# Patient Record
Sex: MALE | Race: WHITE | Employment: FULL TIME | URBAN - METROPOLITAN AREA
[De-identification: names, ages, dates, MRNs, and addresses within clinical notes are randomized per-mention and may not be internally consistent; named-entity substitution may affect disease eponyms.]

---

## 2017-01-13 ENCOUNTER — OFFICE VISIT (OUTPATIENT)
Dept: CARDIOLOGY CLINIC | Age: 52
End: 2017-01-13

## 2017-01-13 ENCOUNTER — CLINICAL SUPPORT (OUTPATIENT)
Dept: CARDIOLOGY CLINIC | Age: 52
End: 2017-01-13

## 2017-01-13 VITALS
BODY MASS INDEX: 29.12 KG/M2 | DIASTOLIC BLOOD PRESSURE: 82 MMHG | WEIGHT: 208 LBS | HEART RATE: 62 BPM | HEIGHT: 71 IN | SYSTOLIC BLOOD PRESSURE: 130 MMHG

## 2017-01-13 DIAGNOSIS — R00.2 PALPITATIONS: Primary | ICD-10-CM

## 2017-01-13 DIAGNOSIS — R00.0 SINUS TACHYCARDIA: Primary | ICD-10-CM

## 2017-01-13 DIAGNOSIS — I49.1 PAC (PREMATURE ATRIAL CONTRACTION): ICD-10-CM

## 2017-01-13 DIAGNOSIS — R00.2 HEART PALPITATIONS: ICD-10-CM

## 2017-01-13 DIAGNOSIS — I49.3 PVC (PREMATURE VENTRICULAR CONTRACTION): ICD-10-CM

## 2017-01-13 DIAGNOSIS — E78.5 DYSLIPIDEMIA: ICD-10-CM

## 2017-01-13 RX ORDER — SIMVASTATIN 10 MG/1
10 TABLET, FILM COATED ORAL
Qty: 30 TAB | Refills: 4 | Status: SHIPPED | OUTPATIENT
Start: 2017-01-13 | End: 2017-12-13

## 2017-01-13 NOTE — MR AVS SNAPSHOT
Visit Information Date & Time Provider Department Dept. Phone Encounter #  
 1/13/2017  3:40 PM Nancy Ashton MD [facility] [phone] [id number] Follow-up Instructions Return in about 6 weeks (around 2/24/2017). Your Appointments 2/7/2017 10:00 AM  
Any with Camilo Casarez MD  
[facility] ([facility]) Appt Note: 1st  adherence, bring cpap .  
 9250 [facility] 99 [phone] 9164 [facility] [address] Upcoming Health Maintenance Date Due INFLUENZA AGE 9 TO ADULT 8/1/2016 FOBT Q 1 YEAR AGE 50-75 6/27/2017 DTaP/Tdap/Td series (2 - Td) 11/28/2024 Allergies as of 1/13/2017  Review Complete On: 1/13/2017 By: Nancy Ashton MD  
 No Known Allergies Current Immunizations  Reviewed on 11/28/2014 Name Date Tdap 11/28/2014 Not reviewed this visit You Were Diagnosed With   
  
 Codes Comments Palpitations    -  Primary ICD-10-CM: R00.2 ICD-9-CM: 785.1 Heart palpitations     ICD-10-CM: R00.2 ICD-9-CM: 785.1 Vitals BP Pulse Height(growth percentile) Weight(growth percentile) BMI Smoking Status 130/82 62 5' 11\" (1.803 m) 208 lb (94.3 kg) 29.01 kg/m2 Never Smoker Vitals History BMI and BSA Data Body Mass Index Body Surface Area  
 29.01 kg/m 2 2.17 m 2 Preferred Pharmacy Pharmacy Name Phone [address], [address] [place] [address] [phone] Your Updated Medication List  
  
   
This list is accurate as of: 1/13/17  3:49 PM.  Always use your most recent med list.  
  
  
  
  
 aspirin 81 mg chewable tablet Take 1 tablet by mouth daily. NexIUM 20 mg capsule Generic drug:  esomeprazole Take  by mouth two (2) times a day. PARoxetine 20 mg tablet Commonly known as:  PAXIL Take 1 Tab by mouth daily. We Performed the Following AMB POC EKG ROUTINE W/ 12 LEADS, INTER & REP [49951 CPT(R)] Follow-up Instructions Return in about 6 weeks (around 2/24/2017). Patient Instructions Please visit [email or web address] for more information on multivitamins including coenzyme Q10. Introducing [facility]! Dear Radha Ames: 
Thank you for requesting a Parents Journey account. Our records indicate that you already have an active Parents Journey account. You can access your account anytime at [email or web address] Did you know that you can access your hospital and ER discharge instructions at any time in Parents Journey? You can also review all of your test results from your hospital stay or ER visit. Additional Information If you have questions, please visit the Frequently Asked Questions section of the Parents Journey website at [email or web address] Remember, Parents Journey is NOT to be used for urgent needs. For medical emergencies, dial 911. Now available from your iPhone and Android! Please provide this summary of care documentation to your next provider. Your primary care clinician is listed as Osiris Lua. If you have any questions after today's visit, please call [phone].

## 2017-01-13 NOTE — PROGRESS NOTES
LAST OFFICE VISIT : Visit date not found        ICD-10-CM ICD-9-CM   1. Palpitations R00.2 785.1   2. Heart palpitations R00.2 785.1            Oly Vaca is a 46 y.o. male new patient referred for palpitations. Cardiac risk factors: dyslipidemia, male gender, sedentary lifestyle. I have personally obtained the history from the patient. HISTORY OF PRESENTING ILLNESS      He reports intermittent palpitations occurring for the last few years that have not changed in frequency or intensity. He states he takes Nexium regularly for acid reflux. He drinks 1-2 drinks per night. He denies smoking. He also denies any family history of heart issues. He is sedentary. The patient denies chest pain/ shortness of breath, orthopnea, PND, LE edema, syncope, presyncope or fatigue.        ACTIVE PROBLEM LIST     Patient Active Problem List    Diagnosis Date Noted    H/O colonoscopy 06/27/2016    History of depression 01/22/2015    Overweight(278.02) 11/28/2014    Organic sleep apnea 11/28/2014    GERD (gastroesophageal reflux disease) 06/19/2014    Hadley's esophagus 06/19/2014           PAST MEDICAL HISTORY     Past Medical History   Diagnosis Date    Depression      due to family deaths     GERD (gastroesophageal reflux disease)      barrets esophageus     Personal history of Uttb-Wxahp-Mcnlrex disease            PAST SURGICAL HISTORY     Past Surgical History   Procedure Laterality Date    Hx carpal tunnel release Bilateral     Hx tonsillectomy      Hx endoscopy  09/2014          ALLERGIES     No Known Allergies       FAMILY HISTORY     Family History   Problem Relation Age of Onset    Heart Disease Mother     negative for cardiac disease       SOCIAL HISTORY     Social History     Social History    Marital status: SINGLE     Spouse name: N/A    Number of children: N/A    Years of education: N/A     Social History Main Topics    Smoking status: Never Smoker    Smokeless tobacco: Former User    Alcohol use Yes      Comment: ocassioanlly     Drug use: No    Sexual activity: Yes     Other Topics Concern    None     Social History Narrative         MEDICATIONS     Current Outpatient Prescriptions   Medication Sig    simvastatin (ZOCOR) 10 mg tablet Take 1 Tab by mouth nightly.  PARoxetine (PAXIL) 20 mg tablet Take 1 Tab by mouth daily.  esomeprazole (NEXIUM) 20 mg capsule Take  by mouth two (2) times a day.  aspirin 81 mg chewable tablet Take 1 tablet by mouth daily. No current facility-administered medications for this visit. I have reviewed the nurses notes, vitals, problem list, allergy list, medical history, family, social history and medications. REVIEW OF SYMPTOMS      General: Pt denies excessive weight gain or loss. Pt is able to conduct ADL's  HEENT: Denies blurred vision, headaches, hearing loss, epistaxis and difficulty swallowing. Respiratory: Denies cough, congestion, shortness of breath, MIRAMONTES, wheezing or stridor. Cardiovascular: Denies precordial pain, palpitations, edema or PND  Gastrointestinal: Denies poor appetite, indigestion, abdominal pain or blood in stool  Genitourinary: Denies hematuria, dysuria, increased urinary frequency  Musculoskeletal: Denies joint pain or swelling from muscles or joints  Neurologic: Denies tremor, paresthesias, headache, or sensory motor disturbance  Psychiatric: Denies confusion, insomnia, depression  Integumentray: Denies rash, itching or ulcers. Hematologic: Denies easy bruising, bleeding     PHYSICAL EXAMINATION      Vitals:    01/13/17 1514   BP: 130/82   Pulse: 62   Weight: 208 lb (94.3 kg)   Height: 5' 11\" (1.803 m)     General: Well developed, in no acute distress. HEENT: No jaundice, oral mucosa moist, no oral ulcers  Neck: Supple, no stiffness, no lymphadenopathy, supple  Heart:  Normal S1/S2 negative S3 or S4.  Regular, no murmur, gallop or rub, no jugular venous distention  Respiratory: Clear bilaterally x 4, no wheezing or rales  Abdomen:   Soft, non-tender, bowel sounds are active.   Extremities:  No edema, normal cap refill, no cyanosis. Musculoskeletal: No clubbing, no deformities  Neuro: A&Ox3, speech clear, gait stable, cooperative, no focal neurologic deficits  Skin: Skin color is normal. No rashes or lesions. Non diaphoretic, moist.  Vascular: 2+ pulses symmetric in all extremities        EKG:      DIAGNOSTIC DATA     1. Lipids  3/8/16- , HDL 53, ,        LABORATORY DATA            Lab Results   Component Value Date/Time    WBC 3.8 03/10/2016 08:45 AM    HGB 15.5 03/10/2016 08:45 AM    HCT 45.1 03/10/2016 08:45 AM    PLATELET 338 90/88/9588 08:45 AM    MCV 92.0 03/10/2016 08:45 AM      Lab Results   Component Value Date/Time    Sodium 139 03/10/2016 08:45 AM    Potassium 3.9 03/10/2016 08:45 AM    Chloride 102 03/10/2016 08:45 AM    CO2 31 03/10/2016 08:45 AM    Anion gap 6 03/10/2016 08:45 AM    Glucose 105 03/10/2016 08:45 AM    BUN 15 03/10/2016 08:45 AM    Creatinine 1.11 03/10/2016 08:45 AM    BUN/Creatinine ratio 14 03/10/2016 08:45 AM    GFR est AA >60 03/10/2016 08:45 AM    GFR est non-AA >60 03/10/2016 08:45 AM    Calcium 8.6 03/10/2016 08:45 AM    Bilirubin, total 1.1 03/10/2016 08:45 AM    ALT 32 03/10/2016 08:45 AM    AST 19 03/10/2016 08:45 AM    Alk. phosphatase 77 03/10/2016 08:45 AM    Protein, total 6.9 03/10/2016 08:45 AM    Albumin 4.0 03/10/2016 08:45 AM    Globulin 2.9 03/10/2016 08:45 AM    A-G Ratio 1.4 03/10/2016 08:45 AM           ASSESSMENT/RECOMMENDATIONS:.      1. Palpitations   -maybe related to Paxil but I am not really sure   -not seeing any irregularity on EKG today  -do favor placing Holter and obtaining echo to ensure that he has no significant valvular abnormalities. 2. Dyslipidemia  -start Zocor 10 mg daily   -check FLP in 2 months     3.  Follow up in 6 months or PRN    Orders Placed This Encounter    LIVER FUNCTION PANEL    LIPID PANEL    AMB POC EKG ROUTINE W/ 12 LEADS, INTER & REP     Order Specific Question:   Reason for Exam:     Answer:   palp    simvastatin (ZOCOR) 10 mg tablet     Sig: Take 1 Tab by mouth nightly. Dispense:  30 Tab     Refill:  4        Follow-up Disposition:  Return in about 6 weeks (around 2/24/2017). I have discussed the diagnosis with  Gladystine Miracle and the intended plan as seen in the above orders. Questions were answered concerning future plans. I have discussed medication side effects and warnings with the patient as well. Thank you,  Nicole Cortes MD for involving me in the care of  Gladystine Miracle. Please do not hesitate to contact me for further questions/concerns. This note was written by Marit Nissen, scribekick, as dictated by Aviva Rascon MD.      Negrita Mendez. MD [place], [address] [place] [address] [address]      [phone] / [phone] Fax

## 2017-01-13 NOTE — PROGRESS NOTES
Visit Vitals    /82    Pulse 62    Ht 5' 11\" (1.803 m)    Wt 208 lb (94.3 kg)    BMI 29.01 kg/m2

## 2017-01-24 ENCOUNTER — CLINICAL SUPPORT (OUTPATIENT)
Dept: CARDIOLOGY CLINIC | Age: 52
End: 2017-01-24

## 2017-01-24 DIAGNOSIS — I49.3 PVC (PREMATURE VENTRICULAR CONTRACTION): ICD-10-CM

## 2017-01-24 DIAGNOSIS — R00.0 SINUS TACHYCARDIA: Primary | ICD-10-CM

## 2017-01-24 DIAGNOSIS — I49.1 PAC (PREMATURE ATRIAL CONTRACTION): ICD-10-CM

## 2017-01-25 LAB
ALBUMIN SERPL-MCNC: 4.6 G/DL (ref 3.5–5.5)
ALP SERPL-CCNC: 68 IU/L (ref 39–117)
ALT SERPL-CCNC: 27 IU/L (ref 0–44)
AST SERPL-CCNC: 21 IU/L (ref 0–40)
BILIRUB DIRECT SERPL-MCNC: 0.2 MG/DL (ref 0–0.4)
BILIRUB SERPL-MCNC: 0.8 MG/DL (ref 0–1.2)
CHOLEST SERPL-MCNC: 192 MG/DL (ref 100–199)
HDLC SERPL-MCNC: 49 MG/DL
LDLC SERPL CALC-MCNC: 124 MG/DL (ref 0–99)
PROT SERPL-MCNC: 6.6 G/DL (ref 6–8.5)
TRIGL SERPL-MCNC: 96 MG/DL (ref 0–149)
VLDLC SERPL CALC-MCNC: 19 MG/DL (ref 5–40)

## 2017-02-15 ENCOUNTER — OFFICE VISIT (OUTPATIENT)
Dept: FAMILY MEDICINE CLINIC | Age: 52
End: 2017-02-15

## 2017-02-15 VITALS
HEIGHT: 71 IN | OXYGEN SATURATION: 97 % | SYSTOLIC BLOOD PRESSURE: 126 MMHG | DIASTOLIC BLOOD PRESSURE: 84 MMHG | TEMPERATURE: 98.4 F | WEIGHT: 211 LBS | RESPIRATION RATE: 16 BRPM | BODY MASS INDEX: 29.54 KG/M2 | HEART RATE: 68 BPM

## 2017-02-15 DIAGNOSIS — R52 BODY ACHES: Primary | ICD-10-CM

## 2017-02-15 DIAGNOSIS — J06.9 ACUTE URI: ICD-10-CM

## 2017-02-15 LAB
QUICKVUE INFLUENZA TEST: NEGATIVE
S PYO AG THROAT QL: NEGATIVE
VALID INTERNAL CONTROL?: YES
VALID INTERNAL CONTROL?: YES

## 2017-02-15 RX ORDER — BENZONATATE 200 MG/1
200 CAPSULE ORAL
Qty: 21 CAP | Refills: 0 | Status: SHIPPED | OUTPATIENT
Start: 2017-02-15 | End: 2017-02-22

## 2017-02-15 NOTE — PROGRESS NOTES
MARLENE Quiles is a 46 y.o. male who presents for cough, sneezing and body aches, fatigue, itchy eyes for  8 days ago . the context: brant recently diagnosed with URI. Denies fever, chills CP/ SOB/ N/V/ diarrhea. Patient tried  Mucinex and Zyrtec with mild relief. Patient does not have a history of smoking. Allergies:   No Known Allergies    Meds:   Current Outpatient Prescriptions   Medication Sig Dispense Refill    benzonatate (TESSALON) 200 mg capsule Take 1 Cap by mouth three (3) times daily as needed for Cough for up to 7 days. 21 Cap 0    simvastatin (ZOCOR) 10 mg tablet Take 1 Tab by mouth nightly. 30 Tab 4    PARoxetine (PAXIL) 20 mg tablet Take 1 Tab by mouth daily. 30 Tab 5    esomeprazole (NEXIUM) 20 mg capsule Take  by mouth two (2) times a day.  aspirin 81 mg chewable tablet Take 1 tablet by mouth daily.  90 tablet 4       PMH:  Past Medical History   Diagnosis Date    Depression      due to family deaths     GERD (gastroesophageal reflux disease)      barrets esophageus     Personal history of Wktc-Khixf-Mihyeor disease        SH:  Smoker:  History   Smoking Status    Never Smoker   Smokeless Tobacco    Former User       ETOH:   History   Alcohol Use    Yes     Comment: ocassanamika        FH:   Family History   Problem Relation Age of Onset    Heart Disease Mother        ROS: Positive for Items in bold:   Constitutional: headache, fever, fatigue, weight loss or weight gain      Eyes: redness, pruritis, pain, visual changes, swelling, or discharge      Ears: ear pain, loss or changes in hearing     Nose: congestion, rhinorrhea  Oropharynx: sore throat, lesions in throat  Cardiac: chest pain      Resp: cough or shortness of breath      GI: nausea, vomiting, constipation, diarrhea, blood in stool  : frequency, urgency, dysuria, hematuria   Neuro: dizziness, numbness, tingling  Psych: anxiety, depression, stress     Physical Exam:  Visit Vitals    /84 (BP 1 Location: Right arm, BP Patient Position: Sitting)    Pulse 68    Temp 98.4 °F (36.9 °C) (Oral)    Resp 16    Ht 5' 11\" (1.803 m)    Wt 211 lb (95.7 kg)    SpO2 97%    BMI 29.43 kg/m2       Gen: No apparent distress. Alert and oriented and responds to all questions appropriately. Eyes: Conjunctiva injected  extraocular movements are intact. Ear: External ears are normal. Hearing grossly normal b/l. Tympanic membranes are clear and without effusion. Nose: Turbinates are within normal limits. No drainage. Oropharynx: No oral lesions or exudates. Neck: Supple; no masses; no  Cervical lymphadenopathy appreciated  Lungs: Respirations unlabored; clear to auscultation bilaterally  Cardio: Regular, rate, and rhythm without murmurs, gallops or rubs   Abdomen: Soft; nontender; nondistended; normoactive bowel sounds; no masses or organomegaly  Ext: Well perfused. No edema. Skin: No obvious rashes. Lab Results   Component Value Date/Time    Sodium 139 03/10/2016 08:45 AM    Potassium 3.9 03/10/2016 08:45 AM    Chloride 102 03/10/2016 08:45 AM    CO2 31 03/10/2016 08:45 AM    Anion gap 6 03/10/2016 08:45 AM    Glucose 105 03/10/2016 08:45 AM    BUN 15 03/10/2016 08:45 AM    Creatinine 1.11 03/10/2016 08:45 AM    BUN/Creatinine ratio 14 03/10/2016 08:45 AM    GFR est AA >60 03/10/2016 08:45 AM    GFR est non-AA >60 03/10/2016 08:45 AM    Calcium 8.6 03/10/2016 08:45 AM     Lab Results   Component Value Date/Time    Cholesterol, total 192 01/24/2017 08:07 AM    HDL Cholesterol 49 01/24/2017 08:07 AM    LDL, calculated 124 01/24/2017 08:07 AM    VLDL, calculated 19 01/24/2017 08:07 AM    Triglyceride 96 01/24/2017 08:07 AM           Assessment and Plan:   Akila Kitchen is a 46 y.o. male who presents for URI      ICD-10-CM ICD-9-CM    1. Body aches R52 780.96 AMB POC RAPID STREP A      AMB POC RAPID INFLUENZA TEST   2. Acute URI J06.9 465.9 benzonatate (TESSALON) 200 mg capsule   flu neg.    Tessalon pearls for cough    - Advised on the need to stay well hydrated and that symptoms can last up to 1.5 weeks with an additional 3 days on average for smokers  - Can use tylenol/motrin as needed for generalized muscle pain and fever  - Continue Mucinex for cough, may also try Tea with honey  - Wash hand frequently and cough/sneeze into your sleeve to help prevent   infection of others   - Educated on the lack of benefit of antibiotics in a viral illness but advised to call if symptoms worsening past 6 days      Discussed diagnoses in detail with patient. Patient expressed understanding of and agreement to above plan. All questions and concerns addressed. Medication risks/benefits/side effects discussed with patient. Patient is counseled to return to the office and/or ED if symptoms do not improve as expected. Patient discussed with Dr. Charisma Hooks, Attending Physician. Jovani Hull MD  02/15/17    Family Medicine Resident

## 2017-02-15 NOTE — MR AVS SNAPSHOT
Visit Information Date & Time Provider Department Dept. Phone Encounter #  
 2/15/2017  6:15 PM Jovani YUSUF 3 Franky Chowdhury, 15[address] [facility] [phone] [id number] Follow-up Instructions Return if symptoms worsen or fail to improve. Your Appointments 2/28/2017  9:00 AM  
Any with Francisca Dixon MD  
[address] [facility] ([facility]) Appt Note: 1st  adherence, bring cpap .; pt rescheduled [address] [place] [phone] 9191 [facility] [phone]  
  
    
 7/7/2017  4:00 PM  
ESTABLISHED PATIENT with Charito Berg MD  
[facility] ([facility]) Appt Note: 6 mo fu appt  [address] [facility],[address] [address]  
[phone]  
  
   
 [address] [address] [address] Upcoming Health Maintenance Date Due INFLUENZA AGE 9 TO ADULT 8/1/2016 FOBT Q 1 YEAR AGE 50-75 6/27/2017 DTaP/Tdap/Td series (2 - Td) 11/28/2024 Allergies as of 2/15/2017  Review Complete On: 2/15/2017 By: Krystal Leggett, AMARA, RT, NM, ARRT No Known Allergies Current Immunizations  Reviewed on 11/28/2014 Name Date Tdap 11/28/2014 Not reviewed this visit You Were Diagnosed With   
  
 Codes Comments Body aches    -  Primary ICD-10-CM: K92 ICD-9-CM: 780.96 Acute URI     ICD-10-CM: J06.9 ICD-9-CM: 465.9 Vitals BP Pulse Temp Resp Height(growth percentile) Weight(growth percentile) 126/84 (BP 1 Location: Right arm, BP Patient Position: Sitting) 68 98.4 °F (36.9 °C) (Oral) 16 5' 11\" (1.803 m) 211 lb (95.7 kg) SpO2 BMI Smoking Status 97% 29.43 kg/m2 Never Smoker Vitals History BMI and BSA Data Body Mass Index Body Surface Area  
 29.43 kg/m 2 2.19 m 2 Preferred Pharmacy Pharmacy Name Phone  [phone] [phone] Your Updated Medication List  
  
   
This list is accurate as of: 2/15/17  6:39 PM.  Always use your most recent med list.  
  
  
  
  
 aspirin 81 mg chewable tablet Take 1 tablet by mouth daily. benzonatate 200 mg capsule Commonly known as:  TESSALON Take 1 Cap by mouth three (3) times daily as needed for Cough for up to 7 days. NexIUM 20 mg capsule Generic drug:  esomeprazole Take  by mouth two (2) times a day. PARoxetine 20 mg tablet Commonly known as:  PAXIL Take 1 Tab by mouth daily. simvastatin 10 mg tablet Commonly known as:  ZOCOR Take 1 Tab by mouth nightly. Prescriptions Printed Refills  
 benzonatate (TESSALON) 200 mg capsule 0 Sig: Take 1 Cap by mouth three (3) times daily as needed for Cough for up to 7 days. Class: Print Route: Oral  
  
We Performed the Following AMB POC RAPID INFLUENZA TEST [15723 CPT(R)] AMB POC RAPID STREP A [21838 CPT(R)] Follow-up Instructions Return if symptoms worsen or fail to improve. Patient Instructions - Advised on the need to stay well hydrated and that symptoms can last up to 1.5 weeks with an additional 3 days on average for smokers 
- Can use tylenol/motrin as needed for generalized muscle pain and fever - Continue Mucinex for cough, may also try Tea with honey - Wash hand frequently and cough/sneeze into your sleeve to help prevent  
infection of others - Educated on the lack of benefit of antibiotics in a viral illness but advised to call if symptoms worsening past 6 days Viral Respiratory Infection: Care Instructions Your Care Instructions Viruses are very small organisms. They grow in number after they enter your body. There are many types that cause different illnesses, such as colds and the mumps. The symptoms of a viral respiratory infection often start quickly.  They include a fever, sore throat, and runny nose. You may also just not feel well. Or you may not want to eat much. Most viral respiratory infections are not serious. They usually get better with time and self-care. Antibiotics are not used to treat a viral infection. That's because antibiotics will not help cure a viral illness. In some cases, antiviral medicine can help your body fight a serious viral infection. Follow-up care is a key part of your treatment and safety. Be sure to make and go to all appointments, and call your doctor if you are having problems. It's also a good idea to know your test results and keep a list of the medicines you take. How can you care for yourself at home? · Rest as much as possible until you feel better. · Be safe with medicines. Take your medicine exactly as prescribed. Call your doctor if you think you are having a problem with your medicine. You will get more details on the specific medicine your doctor prescribes. · Take an over-the-counter pain medicine, such as acetaminophen (Tylenol), ibuprofen (Advil, Motrin), or naproxen (Aleve), as needed for pain and fever. Read and follow all instructions on the label. Do not give aspirin to anyone younger than 20. It has been linked to Reye syndrome, a serious illness. · Drink plenty of fluids, enough so that your urine is light yellow or clear like water. Hot fluids, such as tea or soup, may help relieve congestion in your nose and throat. If you have kidney, heart, or liver disease and have to limit fluids, talk with your doctor before you increase the amount of fluids you drink. · Try to clear mucus from your lungs by breathing deeply and coughing. · Gargle with warm salt water once an hour. This can help reduce swelling and throat pain. Use 1 teaspoon of salt mixed in 1 cup of warm water. · Do not smoke or allow others to smoke around you.  If you need help quitting, talk to your doctor about stop-smoking programs and medicines. These can increase your chances of quitting for good. To avoid spreading the virus · Cough or sneeze into a tissue. Then throw the tissue away. · If you don't have a tissue, use your hand to cover your cough or sneeze. Then clean your hand. You can also cough into your sleeve. · Wash your hands often. Use soap and warm water. Wash for 15 to 20 seconds each time. · If you don't have soap and water near you, you can clean your hands with alcohol wipes or gel. When should you call for help? Call your doctor now or seek immediate medical care if: 
· You have a new or higher fever. · Your fever lasts more than 48 hours. · You have trouble breathing. · You have a fever with a stiff neck or a severe headache. · You are sensitive to light. · You feel very sleepy or confused. Watch closely for changes in your health, and be sure to contact your doctor if: 
· You do not get better as expected. Where can you learn more? Go to http://arben-bayron.info/. Enter W284 in the search box to learn more about \"Viral Respiratory Infection: Care Instructions. \" Current as of: June 30, 2016 Content Version: 11.1 © 6132-1187 [employer], [employer]. Care instructions adapted under license by [employer] (which disclaims liability or warranty for this information). If you have questions about a medical condition or this instruction, always ask your healthcare professional. [place] [address] any warranty or liability for your use of this information. Introducing [facility]! Dear Abigail Villavicencio: 
Thank you for requesting a [employer] account. Our records indicate that you already have an active [employer] account. You can access your account anytime at [email or web address]. Hokey Pokey/Center[employer] Did you know that you can access your hospital and ER discharge instructions at any time in [employer]? You can also review all of your test results from your hospital stay or ER visit. Additional Information If you have questions, please visit the Frequently Asked Questions section of the [employer] website at [email or web address] Remember, [employer] is NOT to be used for urgent needs. For medical emergencies, dial 911. Now available from your iPhone and Android! Please provide this summary of care documentation to your next provider. Your primary care clinician is listed as Irl Imam. If you have any questions after today's visit, please call [phone].

## 2017-02-15 NOTE — PROGRESS NOTES
Chief Complaint   Patient presents with    Nasal Congestion     symptoms for about five days    Generalized Body Aches

## 2017-02-15 NOTE — PATIENT INSTRUCTIONS
- Advised on the need to stay well hydrated and that symptoms can last up to 1.5 weeks with an additional 3 days on average for smokers  - Can use tylenol/motrin as needed for generalized muscle pain and fever  - Continue Mucinex for cough, may also try Tea with honey  - Wash hand frequently and cough/sneeze into your sleeve to help prevent   infection of others   - Educated on the lack of benefit of antibiotics in a viral illness but advised to call if symptoms worsening past 6 days         Viral Respiratory Infection: Care Instructions  Your Care Instructions  Viruses are very small organisms. They grow in number after they enter your body. There are many types that cause different illnesses, such as colds and the mumps. The symptoms of a viral respiratory infection often start quickly. They include a fever, sore throat, and runny nose. You may also just not feel well. Or you may not want to eat much. Most viral respiratory infections are not serious. They usually get better with time and self-care. Antibiotics are not used to treat a viral infection. That's because antibiotics will not help cure a viral illness. In some cases, antiviral medicine can help your body fight a serious viral infection. Follow-up care is a key part of your treatment and safety. Be sure to make and go to all appointments, and call your doctor if you are having problems. It's also a good idea to know your test results and keep a list of the medicines you take. How can you care for yourself at home? · Rest as much as possible until you feel better. · Be safe with medicines. Take your medicine exactly as prescribed. Call your doctor if you think you are having a problem with your medicine. You will get more details on the specific medicine your doctor prescribes. · Take an over-the-counter pain medicine, such as acetaminophen (Tylenol), ibuprofen (Advil, Motrin), or naproxen (Aleve), as needed for pain and fever.  Read and follow all instructions on the label. Do not give aspirin to anyone younger than 20. It has been linked to Reye syndrome, a serious illness. · Drink plenty of fluids, enough so that your urine is light yellow or clear like water. Hot fluids, such as tea or soup, may help relieve congestion in your nose and throat. If you have kidney, heart, or liver disease and have to limit fluids, talk with your doctor before you increase the amount of fluids you drink. · Try to clear mucus from your lungs by breathing deeply and coughing. · Gargle with warm salt water once an hour. This can help reduce swelling and throat pain. Use 1 teaspoon of salt mixed in 1 cup of warm water. · Do not smoke or allow others to smoke around you. If you need help quitting, talk to your doctor about stop-smoking programs and medicines. These can increase your chances of quitting for good. To avoid spreading the virus  · Cough or sneeze into a tissue. Then throw the tissue away. · If you don't have a tissue, use your hand to cover your cough or sneeze. Then clean your hand. You can also cough into your sleeve. · Wash your hands often. Use soap and warm water. Wash for 15 to 20 seconds each time. · If you don't have soap and water near you, you can clean your hands with alcohol wipes or gel. When should you call for help? Call your doctor now or seek immediate medical care if:  · You have a new or higher fever. · Your fever lasts more than 48 hours. · You have trouble breathing. · You have a fever with a stiff neck or a severe headache. · You are sensitive to light. · You feel very sleepy or confused. Watch closely for changes in your health, and be sure to contact your doctor if:  · You do not get better as expected. Where can you learn more? Go to http://arben-bayron.info/. Enter O003 in the search box to learn more about \"Viral Respiratory Infection: Care Instructions. \"  Current as of: June 30, 2016  Content Version: 11.1  © 0636-2057 [employer]. Care instructions adapted under license by [employer] (which disclaims liability or warranty for this information). If you have questions about a medical condition or this instruction, always ask your healthcare professional. [address] any warranty or liability for your use of this information.

## 2017-02-28 ENCOUNTER — OFFICE VISIT (OUTPATIENT)
Dept: SLEEP MEDICINE | Age: 52
End: 2017-02-28

## 2017-02-28 VITALS
SYSTOLIC BLOOD PRESSURE: 129 MMHG | OXYGEN SATURATION: 97 % | BODY MASS INDEX: 30.24 KG/M2 | DIASTOLIC BLOOD PRESSURE: 75 MMHG | HEART RATE: 60 BPM | TEMPERATURE: 98.1 F | HEIGHT: 71 IN | WEIGHT: 216 LBS

## 2017-02-28 DIAGNOSIS — E66.9 OBESITY (BMI 30-39.9): ICD-10-CM

## 2017-02-28 DIAGNOSIS — G47.33 OBSTRUCTIVE SLEEP APNEA (ADULT) (PEDIATRIC): Primary | ICD-10-CM

## 2017-02-28 NOTE — PATIENT INSTRUCTIONS
[address] [place]. [place], [address]  Tel.  [phone]  Fax. [address] [facility] [address]  [place], [address]  Tel.  [phone]  Fax. [phone] [address] [facility] [address]  Tel.  [phone]  Fax. [phone]     PROPER SLEEP HYGIENE    What to avoid  · Do not have drinks with caffeine, such as coffee or black tea, for 8 hours before bed. · Do not smoke or use other types of tobacco near bedtime. Nicotine is a stimulant and can keep you awake. · Avoid drinking alcohol late in the evening, because it can cause you to wake in the middle of the night. · Do not eat a big meal close to bedtime. If you are hungry, eat a light snack. · Do not drink a lot of water close to bedtime, because the need to urinate may wake you up during the night. · Do not read or watch TV in bed. Use the bed only for sleeping and sexual activity. What to try  · Go to bed at the same time every night, and wake up at the same time every morning. Do not take naps during the day. · Keep your bedroom quiet, dark, and cool. · Get regular exercise, but not within 3 to 4 hours of your bedtime. .  · Sleep on a comfortable pillow and mattress. · If watching the clock makes you anxious, turn it facing away from you so you cannot see the time. · If you worry when you lie down, start a worry book. Well before bedtime, write down your worries, and then set the book and your concerns aside. · Try meditation or other relaxation techniques before you go to bed. · If you cannot fall asleep, get up and go to another room until you feel sleepy. Do something relaxing. Repeat your bedtime routine before you go to bed again. · Make your house quiet and calm about an hour before bedtime. Turn down the lights, turn off the TV, log off the computer, and turn down the volume on music. This can help you relax after a busy day.     Drowsy Driving  The [address] [place] [address] [employer] Traffic Safety Administration cites drowsiness as a causing factor in more than 883,254 police reported crashes annually, resulting in 76,000 injuries and 1,500 deaths. Other surveys suggest 55% of people polled have driven while drowsy in the past year, 23% had fallen asleep but not crashed, 3% crashed, and 2% had and accident due to drowsy driving. Who is at risk? Young Drivers: One study of drowsy driving accidents states that 55% of the drivers were under 25 years. Of those, 75% were male. Shift Workers and Travelers: People who work overnight or travel across time zones frequently are at higher risk of experiencing Circadian Rhythm Disorders. They are trying to work and function when their body is programed to sleep. Sleep Deprived: Lack of sleep has a serious impact on your ability to pay attention or focus on a task. Consistently getting less than the average of 8 hours your body needs creates partial or cumulative sleep deprivation. Untreated Sleep Disorders: Sleep Apnea, Narcolepsy, R.L.S., and other sleep disorders (untreated) prevent a person from getting enough restful sleep. This leads to excessive daytime sleepiness and increases the risk for drowsy driving accidents by up to 7 times. Medications / Alcohol: Even over the counter medications can cause drowsiness. Medications that impair a drivers attention should have a warning label. Alcohol naturally makes you sleepy and on its own can cause accidents. Combined with excessive drowsiness its effects are amplified. Signs of Drowsy Driving:   * You don't remember driving the last few miles   * You may drift out of your daphney   * You are unable to focus and your thoughts wander   * You may yawn more often than normal   * You have difficulty keeping your eyes open / nodding off   * Missing traffic signs, speeding, or tailgating  Prevention-   Good sleep hygiene, lifestyle and behavioral choices have the most impact on drowsy driving.  There is no substitute for sleep and the average person requires 8 hours nightly. If you find yourself driving drowsy, stop and sleep. Consider the sleep hygiene tips provided during your visit as well. Medication Refill Policy: Refills for all medications require 1 week advance notice. Please have your pharmacy fax a refill request. We are unable to fax, or call in \"controled substance\" medications and you will need to pick these prescriptions up from our office. [employer] Activation    Thank you for requesting access to PutPlace. Please follow the instructions below to securely access and download your online medical record. PutPlace allows you to send messages to your doctor, view your test results, renew your prescriptions, schedule appointments, and more. How Do I Sign Up? 1. In your internet browser, go to [email or web address]. 2. Click on the First Time User? Click Here link in the Sign In box. You will see the New Member Sign Up page. 3. Enter your PutPlace Access Code exactly as it appears below. You will not need to use this code after youve completed the sign-up process. If you do not sign up before the expiration date, you must request a new code. PutPlace Access Code: Activation code not generated  Current PutPlace Status: Active (This is the date your PutPlace access code will )    4. Enter the last four digits of your Social Security Number (xxxx) and Date of Birth (mm/dd/yyyy) as indicated and click Submit. You will be taken to the next sign-up page. 5. Create a PutPlace ID. This will be your PutPlace login ID and cannot be changed, so think of one that is secure and easy to remember. 6. Create a PutPlace password. You can change your password at any time. 7. Enter your Password Reset Question and Answer. This can be used at a later time if you forget your password. 8. Enter your e-mail address. You will receive e-mail notification when new information is available in [address]   9. Click Sign Up. You can now view and download portions of your medical record. 10. Click the Download Summary menu link to download a portable copy of your medical information. Additional Information    If you have questions, please call [phone]. Remember, Anagran is NOT to be used for urgent needs. For medical emergencies, dial 911.

## 2017-02-28 NOTE — PROGRESS NOTES
[address] [facility]. [place], [address]  Tel.  [phone]  Fax. 100 [facility] 60  [place], [address] [facility]  Tel.  [phone]  Fax. [phone] [address]  Tel.  [phone]  Fax. [phone]     S>Seth A Hildegarde Scheuermann is a 46 y.o. male seen for a positive airway pressure follow-up. He reports no problems using the device. He is 70% compliant over the past 30 days. The following problems are identified:    Drowsiness no Problems exhaling no   Snoring no Forget to put on no   Mask Comfortable yes Can't fall asleep no   Dry Mouth no Mask falls off no   Air Leaking no Frequent awakenings no       Download reviewed. He admits that his sleep has improved. No Known Allergies    He has a current medication list which includes the following prescription(s): simvastatin, paroxetine, esomeprazole, and aspirin. Gerald Flores He  has a past medical history of Depression; GERD (gastroesophageal reflux disease); and Personal history of Hasn-Zahve-Qlyculj disease. Felda Sleepiness Score: 5   and Modified F.O.S.Q. Score Total / 2: 16.5   which reflect improved sleep quality over therapy time. O>    Visit Vitals    /75    Pulse 60    Temp 98.1 °F (36.7 °C)    Ht 5' 11\" (1.803 m)    Wt 216 lb (98 kg)    SpO2 97%    BMI 30.13 kg/m2           General:   Alert, oriented, not in distress   Neck:   No JVD    Chest/Lungs:  symetrical lung expansion , no accessory muscle use    Extremities:  no obvious rashes , negative edema    Neuro:  No focal deficits ; No obvious tremor    Psych:  Normal affect ,  Normal countenance ;           A>    ICD-10-CM ICD-9-CM    1. Obstructive sleep apnea (adult) (pediatric) G47.33 327.23    2. Obesity (BMI 30-39. 9) E66.9 278.00      AHI = 5.2(2014). On CPAP :  5-10 cmH2O. Compliant:      yes    Therapeutic Response:  Positive    P>      he will continue on his current pressure settings.     * He was asked to contact our office for any problems regarding PAP therapy. * Counseling was provided regarding the importance of regular PAP use and on proper sleep hygiene and safe driving. * Re-enforced proper and regular cleaning for the device. 2. Obesity - I have counseled the patient regarding the benefits of weight loss. Lucio Keita MD  Diplomate in Sleep Medicine  [facility]  .

## 2017-03-02 DIAGNOSIS — E78.00 HYPERCHOLESTEREMIA: Primary | ICD-10-CM

## 2017-07-20 ENCOUNTER — OFFICE VISIT (OUTPATIENT)
Dept: FAMILY MEDICINE CLINIC | Age: 52
End: 2017-07-20

## 2017-07-20 VITALS
HEIGHT: 71 IN | RESPIRATION RATE: 16 BRPM | BODY MASS INDEX: 29.54 KG/M2 | TEMPERATURE: 98.5 F | WEIGHT: 211 LBS | SYSTOLIC BLOOD PRESSURE: 125 MMHG | DIASTOLIC BLOOD PRESSURE: 73 MMHG | OXYGEN SATURATION: 98 % | HEART RATE: 58 BPM

## 2017-07-20 DIAGNOSIS — M25.552 LEFT HIP PAIN: Primary | ICD-10-CM

## 2017-07-20 DIAGNOSIS — F41.9 ANXIETY: ICD-10-CM

## 2017-07-20 DIAGNOSIS — K64.9 HEMORRHOIDS, UNSPECIFIED HEMORRHOID TYPE: ICD-10-CM

## 2017-07-20 RX ORDER — BETAMETHASONE SODIUM PHOSPHATE AND BETAMETHASONE ACETATE 3; 3 MG/ML; MG/ML
6 INJECTION, SUSPENSION INTRA-ARTICULAR; INTRALESIONAL; INTRAMUSCULAR; SOFT TISSUE ONCE
Qty: 2 ML | Refills: 0
Start: 2017-07-20 | End: 2017-07-20

## 2017-07-20 RX ORDER — LIDOCAINE HYDROCHLORIDE 10 MG/ML
6 INJECTION INFILTRATION; PERINEURAL ONCE
Qty: 6 ML | Refills: 0
Start: 2017-07-20 | End: 2017-07-20

## 2017-07-20 RX ORDER — PAROXETINE HYDROCHLORIDE 20 MG/1
20 TABLET, FILM COATED ORAL DAILY
Qty: 30 TAB | Refills: 5 | Status: SHIPPED | OUTPATIENT
Start: 2017-07-20 | End: 2018-04-06 | Stop reason: SDUPTHER

## 2017-07-20 NOTE — PROGRESS NOTES
[facility]  OFFICE PROCEDURE PROGRESS NOTE        Chart reviewed for the following: The physician, Randall Crow MD, has reviewed the History, Physical and updated the Allergic reactions for Rah A [address] performed immediately prior to start of procedure:    The physician, Randall Crow MD, has performed the following reviews on [facility] prior to the start of the procedure:            * Patient was identified by name and date of birth   * Agreement on procedure being performed was verified  * Risks and Benefits explained to the patient  * Procedure site verified and marked as necessary  * Patient was positioned for comfort  * Consent was signed and verified     Time: 1640      Date of procedure: 7/20/2017    Procedure performed by:  Soledad Germain MD    Provider assisted by: Sara Wray LPN    Patient assisted by: self    How tolerated by patient: tolerated the procedure well with no complications    Post Procedural Pain Scale: 2 - Hurts Little Bit    Comments: nonee

## 2017-07-20 NOTE — PROGRESS NOTES
Harpal Reid is a 46 y.o. male who presents for left hip pain. 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 He has used opioid pain medication in the past but currently just using tylenol and BC powder. He has had corticosteroid injections in the past with good relief of pain for 6-8 months. No injury or trauma. Pain waxes and wanes with his activity. He has a h/o depression/anxiety. Takes Paxil. Sx well controlled with Paxil and denies side effects. He needs a refill of medication. H/o hemorrhoids. The have been more painful recently. He has tried conservative management. He had a colonoscopy 2-3 years ago and was told it was normal except for the hemorrhoids. PMHx:  Past Medical History:   Diagnosis Date    Depression     due to family deaths     GERD (gastroesophageal reflux disease)     barrets esophageus     Personal history of Nmom-Ttctw-Qroqnhr disease        Meds:   Current Outpatient Prescriptions   Medication Sig Dispense Refill    PARoxetine (PAXIL) 20 mg tablet Take 1 Tab by mouth daily. 30 Tab 5    esomeprazole (NEXIUM) 20 mg capsule Take  by mouth two (2) times a day.  aspirin 81 mg chewable tablet Take 1 tablet by mouth daily. 90 tablet 4    simvastatin (ZOCOR) 10 mg tablet Take 1 Tab by mouth nightly. 30 Tab 4       Allergies:   No Known Allergies    Smoker:  History   Smoking Status    Never Smoker   Smokeless Tobacco    Former User       ETOH:   History   Alcohol Use    Yes     Comment: ocassanamika        FH:   Family History   Problem Relation Age of Onset    Heart Disease Mother        ROS:  Per HPI otherwise negative. Physical Exam:  Visit Vitals    /73    Pulse (!) 58    Temp 98.5 °F (36.9 °C) (Oral)    Resp 16    Ht 5' 11\" (1.803 m)    Wt 211 lb (95.7 kg)    SpO2 98%    BMI 29.43 kg/m2     Gen: NAD. Responds to all questions appropriately. Lungs: No labored respirations.   Skin: No obvious rash  Ext: Well perfused. No edema. MSK - Hip left:    Deformity: None    ROM: Limited in all directions on the left hip compared to right. Gait: slightly antalgic. Palpation:    L1-L5: No tenderness    Sacrum: No tenderness    Coccyx: No tenderness    Left Paraspinal: No tenderness    Right Paraspinal: No tenderness    Greater trochanter: No tenderness    Ischial Tuberosity: No tenderness    Piriformis: No tenderness       Strength (0-5/5)    Hip Flexion:   Left: 5/5  Right: 5/5    Hip Extension:  Left: 5/5  Right: 5/5    Hip Abduction:  Left: 5/5  Right: 5/5    Hip Adduction:  Left: 5/5  Right: 5/5    Knee Extension:  Left: 5/5  Right: 5/5    Knee Flexion:   Left: 5/5  Right: 5/5       Sensation: Intact, no deficits      Special test:    Straight leg: Left: Negative  Right: Negative      Imaging: Radiographs of the left hip personally reviewed and demonstrates no obvious fracture or dislocation. Humeral head and neck deformity likely related to h/o Ssmj-Shfar-Wpomaus. PROCEDURE NOTE:     Informed consent obtained verbally and risks, benefits and alternatives discussed. Time out performed, cross checking patient ID and procedure. The left hip was cleaned and prepped with sterile technique using Chloraprep x3 and anesthetized with ethyl chloride spray. The femoral acetabular joint was identified with ultrasound and was injected  from an anterior-lateral approach with Celestone 12 mg and 3 ml of 1% lidocaine under sterile conditions using ultrasound guidance. The patient tolerated the procedure well and there were no complications. He had improvement of pain following the injection. Assessment:    ICD-10-CM ICD-9-CM    1. Left hip pain M25.552 719.45 XR HIP LT W OR WO PELV 2-3 VWS   2. Anxiety F41.9 300.00 PARoxetine (PAXIL) 20 mg tablet   3. Hemorrhoids, unspecified hemorrhoid type K64.9 455.6 REFERRAL TO GENERAL SURGERY       Plan:  Left hip pain: DJD. Corticosteroid injection as above. Tylenol PRN. If sx worsen and/or no improvement with injection, then can consider joint replacement. Anxiety/depression: Well controlled. Continue Paxil  Hemorrhoids: Referred to [facility] - Dr. Briseida Colon for consideration of hemorrhoidectomy.      RTC: 6 months

## 2017-07-20 NOTE — MR AVS SNAPSHOT
Visit Information Date & Time Provider Department Dept. Phone Encounter #  
 7/20/2017  3:45 PM [facility] [phone] [id number] Upcoming Health Maintenance Date Due FOBT Q 1 YEAR AGE 50-75 6/27/2017 INFLUENZA AGE 9 TO ADULT 8/1/2017 DTaP/Tdap/Td series (2 - Td) 11/28/2024 Allergies as of 7/20/2017  Review Complete On: 7/20/2017 By: Olam Cushing, LPN No Known Allergies Current Immunizations  Reviewed on 11/28/2014 Name Date Tdap 11/28/2014 Not reviewed this visit You Were Diagnosed With   
  
 Codes Comments Left hip pain    -  Primary ICD-10-CM: G73.625 ICD-9-CM: 719.45 Anxiety     ICD-10-CM: F41.9 ICD-9-CM: 300.00 Hemorrhoids, unspecified hemorrhoid type     ICD-10-CM: K64.9 ICD-9-CM: 783. 6 Vitals BP Pulse Temp Resp Height(growth percentile) Weight(growth percentile) 125/73 (!) 58 98.5 °F (36.9 °C) (Oral) 16 5' 11\" (1.803 m) 211 lb (95.7 kg) SpO2 BMI Smoking Status 98% 29.43 kg/m2 Never Smoker BMI and BSA Data Body Mass Index Body Surface Area  
 29.43 kg/m 2 2.19 m 2 Preferred Pharmacy Pharmacy Name Phone [address], [address] [place] [address] [phone] Your Updated Medication List  
  
   
This list is accurate as of: 7/20/17  4:40 PM.  Always use your most recent med list.  
  
  
  
  
 aspirin 81 mg chewable tablet Take 1 tablet by mouth daily. NexIUM 20 mg capsule Generic drug:  esomeprazole Take  by mouth two (2) times a day. PARoxetine 20 mg tablet Commonly known as:  PAXIL Take 1 Tab by mouth daily. simvastatin 10 mg tablet Commonly known as:  ZOCOR Take 1 Tab by mouth nightly. Prescriptions Sent to Pharmacy Refills PARoxetine (PAXIL) 20 mg tablet 5 Sig: Take 1 Tab by mouth daily.   
 Class: Normal  
 [facility]acy: [facility] [address] [place] [address], [address] [place] [address] #: [phone] Route: Oral  
  
We Performed the Following REFERRAL TO GENERAL SURGERY [REF27 Custom] Comments:  
 Please evaluate patient for evaluation of hemorrhoidectomy. Please refer to Dr. Ron Morley. To-Do List   
 07/20/2017 Imaging:  XR HIP LT W OR WO PELV 2-3 VWS Referral Information Referral ID Referred By Referred To  
  
 [id number] [facility], [address] Not Available Visits Status Start Date End Date 1 New Request 7/20/17 7/20/18 If your referral has a status of pending review or denied, additional information will be sent to support the outcome of this decision. Introducing [facility]! Dear Dodie Andrade: 
Thank you for requesting a Apps Genius account. Our records indicate that you have previously registered for a Apps Genius account but its currently inactive. Please call our Apps Genius support line at [phone]. Additional Information If you have questions, please visit the Frequently Asked Questions section of the Apps Genius website at [email or web address] Remember, Apps Genius is NOT to be used for urgent needs. For medical emergencies, dial 911. Now available from your iPhone and Android! Please provide this summary of care documentation to your next provider. Your primary care clinician is listed as Nemo Marrufo. If you have any questions after today's visit, please call [phone].

## 2017-07-27 ENCOUNTER — TELEPHONE (OUTPATIENT)
Dept: FAMILY MEDICINE CLINIC | Age: 52
End: 2017-07-27

## 2017-07-27 NOTE — TELEPHONE ENCOUNTER
----- Message from [facility] sent at 7/26/2017  5:41 PM EDT -----  Regarding: Dr. Reena Simon  Mrs. Cristin Albarran, pt's spouse, inquiring on the referral to general surgerian. Mrs. Cristin Albarran stated, if the office would call and just leave a voicemail message of  physician information they will call and schedule an appt. Also they have been waiting on this information since last visit on 7/20/17.   Best contact number [phone]

## 2017-07-27 NOTE — TELEPHONE ENCOUNTER
Left the name of the physician for patient to call & informed him to call me back with the date of the appointment. ...

## 2017-08-11 ENCOUNTER — OFFICE VISIT (OUTPATIENT)
Dept: SURGERY | Age: 52
End: 2017-08-11

## 2017-08-11 VITALS
TEMPERATURE: 98.1 F | HEART RATE: 55 BPM | DIASTOLIC BLOOD PRESSURE: 80 MMHG | RESPIRATION RATE: 14 BRPM | BODY MASS INDEX: 29.26 KG/M2 | SYSTOLIC BLOOD PRESSURE: 129 MMHG | OXYGEN SATURATION: 99 % | HEIGHT: 71 IN | WEIGHT: 209 LBS

## 2017-08-11 DIAGNOSIS — K64.8 INTERNAL BLEEDING HEMORRHOIDS: Primary | ICD-10-CM

## 2017-08-11 NOTE — PROGRESS NOTES
Surgery History and Physical    Subjective:      Samantha Shine is a 46 y.o. white male who presents for evaluation of hemorrhoids. Since his prep for his colonoscopy 2 years ago, Mr. Harleen Quezada has had problems with his hemorrhoids. With his bowel movements, his hemorrhoids prolapse. They either spontaneously reduce or he manually does it. He is now having bright red blood with almost each BM as well. He denies any pain or problems with constipation or diarrhea. He has 1 or 2 BM's per day without straining. He does take a high fiber diet. He denies any trauma to the area or previous hemorrhoid or anal procedures. His colonoscopy was otherwise negative. He has no personal or family h/o colorectal or anal cancer. Past Medical History:   Diagnosis Date    Depression     due to family deaths     GERD (gastroesophageal reflux disease)     barrets esophageus     Personal history of Nglt-Xbrjh-Jqtojce disease      Past Surgical History:   Procedure Laterality Date    HX CARPAL TUNNEL RELEASE Bilateral     HX COLONOSCOPY      HX ENDOSCOPY  09/2014    HX TONSILLECTOMY        Family History   Problem Relation Age of Onset    Heart Disease Mother      Social History   Substance Use Topics    Smoking status: Never Smoker    Smokeless tobacco: Former User    Alcohol use Yes      Comment: ocassioanlly       Prior to Admission medications    Medication Sig Start Date End Date Taking? Authorizing Provider   PARoxetine (PAXIL) 20 mg tablet Take 1 Tab by mouth daily. 7/20/17  Yes Angy Quinteros MD   esomeprazole (NEXIUM) 20 mg capsule Take  by mouth two (2) times a day. Yes Historical Provider   aspirin 81 mg chewable tablet Take 1 tablet by mouth daily. 10/14/14  Yes Tete Foreman MD   simvastatin (ZOCOR) 10 mg tablet Take 1 Tab by mouth nightly.  1/13/17   Carolynn Tanner MD      No Known Allergies    Review of Systems:  A comprehensive review of systems was negative except for that written in the History of Present Illness. Objective:      Physical Exam:  GENERAL: alert, cooperative, no distress, appears stated age, EYE: negative findings: anicteric sclera, LYMPHATIC: Cervical, supraclavicular nodes normal. , THROAT & NECK: neck supple and symmetrical.  The thyroid is grossly normal., LUNG: clear to auscultation bilaterally, HEART: regular rate and rhythm, ABDOMEN: Soft, NT, ND., EXTREMITIES:  no edema, SKIN: Normal., NEUROLOGIC: negative, PSYCHIATRIC: non focal, RECTAL: Grossly normal sphincter tone. Grossly heme negative, but no stool in the vault. There are moderate internal hemorrhoids, but no other masses. DIAGNOSTIC ANOSCOPY:  Moderate internal hemorrhoids. No other anal masses. No gross blood. Assessment:     Bleeding internal hemorrhoids, grade 2. Plan:     Mr. Carlos Francis appears to have moderate hemorrhoidal disease. I have given him the option of having them excised or continuing with conservative treatment. He has decided on the latter. I have recommended that he maintain a high fiber diet or add a fiber supplement. If he has persistent bleeding when not having BM's, or develops pain or difficulty moving his bowels, then he should come back to be re-evaluated. Otherwise, he can f/u prn.     Signed By: Adama Liz MD     August 11, 2017

## 2017-08-11 NOTE — PROGRESS NOTES
1. Have you been to the ER, urgent care clinic since your last visit? Hospitalized since your last visit?no    2. Have you seen or consulted any other health care providers outside of the [address] [place] [address] since your last visit? Include any pap smears or colon screening.  no

## 2017-09-25 ENCOUNTER — OFFICE VISIT (OUTPATIENT)
Dept: FAMILY MEDICINE CLINIC | Age: 52
End: 2017-09-25

## 2017-09-25 VITALS
BODY MASS INDEX: 29.61 KG/M2 | HEART RATE: 71 BPM | OXYGEN SATURATION: 100 % | RESPIRATION RATE: 16 BRPM | TEMPERATURE: 97.9 F | DIASTOLIC BLOOD PRESSURE: 85 MMHG | SYSTOLIC BLOOD PRESSURE: 132 MMHG | HEIGHT: 71 IN | WEIGHT: 211.5 LBS

## 2017-09-25 DIAGNOSIS — Z87.39 PERSONAL HISTORY OF LEGG-CALVE-PERTHES DISEASE: ICD-10-CM

## 2017-09-25 DIAGNOSIS — M25.552 PAIN OF LEFT HIP JOINT: Primary | ICD-10-CM

## 2017-09-25 RX ORDER — CELECOXIB 100 MG/1
100 CAPSULE ORAL 2 TIMES DAILY
Qty: 60 CAP | Refills: 2 | Status: SHIPPED | OUTPATIENT
Start: 2017-09-25 | End: 2017-12-13

## 2017-09-25 NOTE — PROGRESS NOTES
Subjective:   History: This patient is a 46 y.o. male who presents for f/u of left hip pain. H/o Leg Calv Perthese as a child (reports that it started at [facility]). He was seen here 2 months ago for left hip pain at what time he received a corticosteroid injection that helped relief the pain for about 3 weeks. 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 He tried [facility] [place] powder which did not seem to help much. He also tried physical therapy in the past which did not help. The pain comes and go with activity. Denies any recent trauma. No bowel or bladder incontinence. No fever, night sweats, or weight changes. No saddle anesthesia. Left hip xray (7/20): There is marked left femoral head is broadened enlargement, foreshortened left humeral  neck, and an associated shallow acetabulum. 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 PMHx:  Past Medical History:   Diagnosis Date    Depression     due to family deaths     GERD (gastroesophageal reflux disease)     barrets esophageus     Hemorrhoids, internal, with bleeding     Personal history of Gwpm-Kvjrm-Pqhdceh disease     Sleep apnea        Meds:   Current Outpatient Prescriptions   Medication Sig Dispense Refill    celecoxib (CELEBREX) 100 mg capsule Take 1 Cap by mouth two (2) times a day. 60 Cap 2    PARoxetine (PAXIL) 20 mg tablet Take 1 Tab by mouth daily. 30 Tab 5    esomeprazole (NEXIUM) 20 mg capsule Take  by mouth two (2) times a day.  aspirin 81 mg chewable tablet Take 1 tablet by mouth daily. 90 tablet 4    simvastatin (ZOCOR) 10 mg tablet Take 1 Tab by mouth nightly.  30 Tab 4       Allergies:   No Known Allergies    Smoker:  History   Smoking Status    Never Smoker   Smokeless Tobacco    Former User       ETOH:   History   Alcohol Use    Yes     Comment: [email or web address]        FH:   Family History   Problem Relation Age of Onset    Heart Disease Mother ROS:  General/Constitutional:   No headache, fever, fatigue, weight loss or weight gain       Eyes:   No redness, pruritis, pain, visual changes, swelling, or discharge      Ears:    No pain, loss or changes in hearing     Neck:   No swelling, masses, stiffness, pain, or limited movement     Cardiac:    No chest pain      Respiratory:   No cough or shortness of breath     GI:   No nausea/vomiting, diarrhea, abdominal pain, bloody or dark stools       :   No dysuria or  hematuria    Neurological:   No loss of consciousness, dizziness, seizures, dysarthria, cognitive changes, memory changes,  problems with balance, or unilateral weakness     Skin: No rash     Physical Exam:  Visit Vitals    /85 (BP 1 Location: Right arm, BP Patient Position: Sitting)    Pulse 71    Temp 97.9 °F (36.6 °C) (Oral)    Resp 16    Ht 5' 11\" (1.803 m)    Wt 211 lb 8 oz (95.9 kg)    SpO2 100%    BMI 29.5 kg/m2     GEN: No apparent distress. Alert and oriented and responds to all questions appropriately. EYES:  Conjunctiva clear;   LUNGS: Respirations unlabored;   EXT: Well perfused. No edema. Moving all ext equally. Pain in left hip groin with movement. SKIN: No obvious rashes. Assessment:    ICD-10-CM ICD-9-CM    1. Pain of left hip joint M25.552 719.45 REFERRAL TO ORTHOPEDICS   2. Personal history of Huco-Svkxx-Yabifpr disease Z87.39 V13.59 REFERRAL TO ORTHOPEDICS   He has tried conservative management without relief.       Plan:  Celebrex 100mg BID  Referred to Ortho - Dr. Marco Hines for further evaluation

## 2017-09-25 NOTE — PROGRESS NOTES
Chief Complaint   Patient presents with    Hip Pain     Pt is being seen due to ip pain X 8 months. Pain is increased during walking. PT states that he uses over the counter medications but they result in minimum improvement.

## 2017-09-25 NOTE — MR AVS SNAPSHOT
Visit Information Date & Time Provider Department Dept. Phone Encounter #  
 9/25/2017  8:45 AM Candy Hussein MD [facility] [phone] [id number] Upcoming Health Maintenance Date Due FOBT Q 1 YEAR AGE 50-75 6/27/2017 INFLUENZA AGE 9 TO ADULT 8/1/2017 DTaP/Tdap/Td series (2 - Td) 11/28/2024 Allergies as of 9/25/2017  Review Complete On: 9/25/2017 By: Candy Hussein MD  
 No Known Allergies Current Immunizations  Reviewed on 11/28/2014 Name Date Tdap 11/28/2014 Not reviewed this visit You Were Diagnosed With   
  
 Codes Comments Pain of left hip joint    -  Primary ICD-10-CM: M25.552 ICD-9-CM: 719.45 Personal history of Ifrg-Nfghc-Juetzey disease     ICD-10-CM: Z87.39 
ICD-9-CM: V13.59 Vitals BP Pulse Temp Resp Height(growth percentile) Weight(growth percentile) 132/85 (BP 1 Location: Right arm, BP Patient Position: Sitting) 71 97.9 °F (36.6 °C) (Oral) 16 5' 11\" (1.803 m) 211 lb 8 oz (95.9 kg) SpO2 BMI Smoking Status 100% 29.5 kg/m2 Never Smoker BMI and BSA Data Body Mass Index Body Surface Area  
 29.5 kg/m 2 2.19 m 2 Preferred Pharmacy Pharmacy Name Phone [phone], [address] [place] [address] [phone] Your Updated Medication List  
  
   
This list is accurate as of: 9/25/17  4:48 PM.  Always use your most recent med list.  
  
  
  
  
 aspirin 81 mg chewable tablet Take 1 tablet by mouth daily. celecoxib 100 mg capsule Commonly known as:  CELEBREX Take 1 Cap by mouth two (2) times a day. NexIUM 20 mg capsule Generic drug:  esomeprazole Take  by mouth two (2) times a day. PARoxetine 20 mg tablet Commonly known as:  PAXIL Take 1 Tab by mouth daily. simvastatin 10 mg tablet Commonly known as:  ZOCOR Take 1 Tab by mouth nightly. Prescriptions Sent to Pharmacy Refills  
 celecoxib (CELEBREX) 100 mg capsule 2 Sig: Take 1 Cap by mouth two (2) times a day. Class: Normal  
 Pharmacy: [id number], [address] [place] [address] #: [phone] Route: Oral  
  
We Performed the Following REFERRAL TO ORTHOPEDICS [[id number] Custom] Comments:  
 Please evaluate patient for hip pain/dysplasia. Please refer to [facility]. Nancy Gerard with [facility]. Referral Information Referral ID Referred By Referred To  
  
 [id number] JELANI GREEN Not Available Visits Status Start Date End Date 1 New Request 9/25/17 9/25/18 If your referral has a status of pending review or denied, additional information will be sent to support the outcome of this decision. Introducing [facility]! Dear [address] [place] [address]: 
Thank you for requesting a Newsle account. Our records indicate that you have previously registered for a Newsle account but its currently inactive. Please call our Newsle support line at [phone]. Additional Information If you have questions, please visit the Frequently Asked Questions section of the Newsle website at [email or web address] Remember, Newsle is NOT to be used for urgent needs. For medical emergencies, dial 911. Now available from your iPhone and Android! Please provide this summary of care documentation to your next provider. Your primary care clinician is listed as Jaclyn Mar. If you have any questions after today's visit, please call [phone].

## 2017-10-16 ENCOUNTER — HOSPITAL ENCOUNTER (OUTPATIENT)
Dept: CT IMAGING | Age: 52
Discharge: HOME | End: 2017-10-16
Attending: ORTHOPAEDIC SURGERY
Payer: COMMERCIAL

## 2017-10-16 DIAGNOSIS — M25.552 LEFT HIP PAIN: ICD-10-CM

## 2017-10-16 PROCEDURE — 73700 CT LOWER EXTREMITY W/O DYE: CPT

## 2017-11-07 ENCOUNTER — TELEPHONE (OUTPATIENT)
Dept: FAMILY MEDICINE CLINIC | Age: 52
End: 2017-11-07

## 2017-11-07 DIAGNOSIS — F41.9 ANXIETY: ICD-10-CM

## 2017-11-07 RX ORDER — PAROXETINE HYDROCHLORIDE 20 MG/1
20 TABLET, FILM COATED ORAL DAILY
Qty: 90 TAB | Refills: 0 | Status: CANCELLED | OUTPATIENT
Start: 2017-11-07

## 2017-11-07 NOTE — TELEPHONE ENCOUNTER
Pharmacy called stating that per the patient's insurance, the paroxetine needs to be written as a 90-day supply

## 2017-12-13 ENCOUNTER — HOSPITAL ENCOUNTER (OUTPATIENT)
Dept: PREADMISSION TESTING | Age: 52
Discharge: HOME | End: 2017-12-13
Payer: COMMERCIAL

## 2017-12-13 VITALS
RESPIRATION RATE: 18 BRPM | BODY MASS INDEX: 30.12 KG/M2 | OXYGEN SATURATION: 96 % | HEIGHT: 71 IN | WEIGHT: 215.17 LBS | SYSTOLIC BLOOD PRESSURE: 131 MMHG | TEMPERATURE: 98.9 F | HEART RATE: 61 BPM | DIASTOLIC BLOOD PRESSURE: 77 MMHG

## 2017-12-13 LAB
ABO + RH BLD: NORMAL
ALBUMIN SERPL-MCNC: 4.2 G/DL (ref 3.5–5)
ALBUMIN/GLOB SERPL: 1.5 {RATIO} (ref 1.1–2.2)
ALP SERPL-CCNC: 90 U/L (ref 45–117)
ALT SERPL-CCNC: 37 U/L (ref 12–78)
ANION GAP SERPL CALC-SCNC: 7 MMOL/L (ref 5–15)
APPEARANCE UR: CLEAR
AST SERPL-CCNC: 20 U/L (ref 15–37)
ATRIAL RATE: 60 BPM
BACTERIA URNS QL MICRO: NEGATIVE /HPF
BASOPHILS # BLD: 0 K/UL (ref 0–0.1)
BASOPHILS NFR BLD: 0 % (ref 0–1)
BILIRUB SERPL-MCNC: 0.4 MG/DL (ref 0.2–1)
BILIRUB UR QL: NEGATIVE
BLOOD GROUP ANTIBODIES SERPL: NORMAL
BUN SERPL-MCNC: 16 MG/DL (ref 6–20)
BUN/CREAT SERPL: 15 (ref 12–20)
CALCIUM SERPL-MCNC: 8.9 MG/DL (ref 8.5–10.1)
CALCULATED P AXIS, ECG09: 34 DEGREES
CALCULATED R AXIS, ECG10: -40 DEGREES
CALCULATED T AXIS, ECG11: 19 DEGREES
CHLORIDE SERPL-SCNC: 102 MMOL/L (ref 97–108)
CO2 SERPL-SCNC: 32 MMOL/L (ref 21–32)
COLOR UR: ABNORMAL
CREAT SERPL-MCNC: 1.04 MG/DL (ref 0.7–1.3)
CRP SERPL-MCNC: <0.29 MG/DL
DIAGNOSIS, 93000: NORMAL
EOSINOPHIL # BLD: 0.2 K/UL (ref 0–0.4)
EOSINOPHIL NFR BLD: 3 % (ref 0–7)
EPITH CASTS URNS QL MICRO: ABNORMAL /LPF
ERYTHROCYTE [DISTWIDTH] IN BLOOD BY AUTOMATED COUNT: 12.2 % (ref 11.5–14.5)
ERYTHROCYTE [SEDIMENTATION RATE] IN BLOOD: 2 MM/HR (ref 0–20)
EST. AVERAGE GLUCOSE BLD GHB EST-MCNC: 108 MG/DL
GLOBULIN SER CALC-MCNC: 2.8 G/DL (ref 2–4)
GLUCOSE SERPL-MCNC: 97 MG/DL (ref 65–100)
GLUCOSE UR STRIP.AUTO-MCNC: NEGATIVE MG/DL
HBA1C MFR BLD: 5.4 % (ref 4.2–6.3)
HCT VFR BLD AUTO: 45.1 % (ref 36.6–50.3)
HGB BLD-MCNC: 15.5 G/DL (ref 12.1–17)
HGB UR QL STRIP: ABNORMAL
HYALINE CASTS URNS QL MICRO: ABNORMAL /LPF (ref 0–5)
KETONES UR QL STRIP.AUTO: NEGATIVE MG/DL
LEUKOCYTE ESTERASE UR QL STRIP.AUTO: NEGATIVE
LYMPHOCYTES # BLD: 1.9 K/UL (ref 0.8–3.5)
LYMPHOCYTES NFR BLD: 36 % (ref 12–49)
MCH RBC QN AUTO: 32.6 PG (ref 26–34)
MCHC RBC AUTO-ENTMCNC: 34.4 G/DL (ref 30–36.5)
MCV RBC AUTO: 94.9 FL (ref 80–99)
MONOCYTES # BLD: 0.5 K/UL (ref 0–1)
MONOCYTES NFR BLD: 11 % (ref 5–13)
NEUTS SEG # BLD: 2.6 K/UL (ref 1.8–8)
NEUTS SEG NFR BLD: 50 % (ref 32–75)
NITRITE UR QL STRIP.AUTO: NEGATIVE
P-R INTERVAL, ECG05: 146 MS
PH UR STRIP: 6 [PH] (ref 5–8)
PLATELET # BLD AUTO: 218 K/UL (ref 150–400)
POTASSIUM SERPL-SCNC: 4 MMOL/L (ref 3.5–5.1)
PROT SERPL-MCNC: 7 G/DL (ref 6.4–8.2)
PROT UR STRIP-MCNC: NEGATIVE MG/DL
Q-T INTERVAL, ECG07: 416 MS
QRS DURATION, ECG06: 116 MS
QTC CALCULATION (BEZET), ECG08: 416 MS
RBC # BLD AUTO: 4.75 M/UL (ref 4.1–5.7)
RBC #/AREA URNS HPF: ABNORMAL /HPF (ref 0–5)
SODIUM SERPL-SCNC: 141 MMOL/L (ref 136–145)
SP GR UR REFRACTOMETRY: 1.02 (ref 1–1.03)
SPECIMEN EXP DATE BLD: NORMAL
UA: UC IF INDICATED,UAUC: ABNORMAL
UROBILINOGEN UR QL STRIP.AUTO: 1 EU/DL (ref 0.2–1)
VENTRICULAR RATE, ECG03: 60 BPM
WBC # BLD AUTO: 5.2 K/UL (ref 4.1–11.1)
WBC URNS QL MICRO: ABNORMAL /HPF (ref 0–4)

## 2017-12-13 PROCEDURE — 80053 COMPREHEN METABOLIC PANEL: CPT | Performed by: ORTHOPAEDIC SURGERY

## 2017-12-13 PROCEDURE — 85025 COMPLETE CBC W/AUTO DIFF WBC: CPT | Performed by: ORTHOPAEDIC SURGERY

## 2017-12-13 PROCEDURE — 84466 ASSAY OF TRANSFERRIN: CPT | Performed by: ORTHOPAEDIC SURGERY

## 2017-12-13 PROCEDURE — 36415 COLL VENOUS BLD VENIPUNCTURE: CPT | Performed by: ORTHOPAEDIC SURGERY

## 2017-12-13 PROCEDURE — 86140 C-REACTIVE PROTEIN: CPT | Performed by: ORTHOPAEDIC SURGERY

## 2017-12-13 PROCEDURE — 85652 RBC SED RATE AUTOMATED: CPT | Performed by: ORTHOPAEDIC SURGERY

## 2017-12-13 PROCEDURE — 81001 URINALYSIS AUTO W/SCOPE: CPT | Performed by: NURSE PRACTITIONER

## 2017-12-13 PROCEDURE — 93005 ELECTROCARDIOGRAM TRACING: CPT

## 2017-12-13 PROCEDURE — 86900 BLOOD TYPING SEROLOGIC ABO: CPT | Performed by: ORTHOPAEDIC SURGERY

## 2017-12-13 PROCEDURE — 83036 HEMOGLOBIN GLYCOSYLATED A1C: CPT | Performed by: ORTHOPAEDIC SURGERY

## 2017-12-13 NOTE — PERIOP NOTES
[facility]  PREOPERATIVE INSTRUCTIONS    Surgery Date:  12/27/2017 Wednesday     Surgery arrival time given by surgeon: NO  (If [facility] staff will call you between 3pm - 7pm the day before surgery with your arrival time. If your surgery is on a Monday, we will call you the preceding Friday. Please call [phone] after 7pm if you did not receive your arrival time.)  1. Report  to the 2nd Floor Admitting Desk on the day of your surgery. Bring your insurance card, photo identification, and any copayment (if applicable). 2. You must have a responsible adult to drive you home and stay with you the first 24 hours after surgery if you are going home the same day of your surgery. 3. Nothing to eat or drink after midnight the night before surgery. This means NO water, gum, mints, coffee, juice, etc.    4. MEDICATIONS TO TAKE THE MORNING OF SURGERY WITH A SIP OF WATER: Take your Paxil and Nexium. Stop your aspirin 7 days before surgery. 5. No alcoholic beverages 24 hours before and after your surgery. 6. If you are being admitted to the hospital,please leave personal belongings/luggage in your car until you have an assigned hospital room number. ( The hospital discharge time is 12 PM NOON. Your adult  should be at the hospital prior to the noon discharge time unless otherwise instructed.)   7. STOP Aspirin and/or any non-steroidal anti-inflammatory drugs (i.e. Ibuprofen, Naproxen, Advil, Aleve) as directed by your surgeon. You may take Tylenol. Stop herbal supplements 1 week prior to  surgery. 8. If you are currently taking Plavix, Coumadin,or any other blood-thinning/ anticoagulant medication contact your surgeon for instructions. 9. Wear comfortable clothes. Wear your glasses instead of contacts. Please leave all money, jewelry and valuables at home. No make up, particularly mascara, the day of surgery. 10.  REMOVE ALL body piercings, rings,and jewelry and leave at home.   Wear your hair loose or down, no pony-tails, buns, or any metal hair clips. 11. If you shower the morning of surgery, please do not apply any lotions, powders, or deodorants afterwards. Do not shave any body area within 24 hours of your surgery. 12. Please follow all instructions to avoid any potential surgical cancellation. 13. Should your physical condition change, (i.e. fever, cold, flu, etc.) please notify your surgeon as soon as possible. 14. It is important to be on time. If a situation occurs where you may be delayed, please call:  [phone] / [phone] on the day of surgery. 15. The Preadmission Testing staff can be reached at [phone]. 16.  Special Instructions: Please bring your CPAP with you on the day of surgery. · Use Chlorhexidine Care Fusion wash and sponges 3 days prior to surgery as instructed. · Incentive spirometer given with instructions to practice at home and bring back to the hospital on the day of surgery. · [facility] will contact you if your Hemoglobin A1C is greater than 7.5. · Ensure/Glucerna  sample, nutritional information, and Ensure/Glucerna coupon given. · Pain pamphlet and Call Don't Fall reminder reviewed with patient. ·  parking is complimentary Monday - Friday 7 am - 5 pm  · Bring PTA Medication list day of surgery with the last doses taken documented   · Do not bring medication bottles the day of surgery  16. The patient was contacted  in person. He  verbalize  understanding of all instructions does not  need reinforcement.

## 2017-12-13 NOTE — H&P
PAT Pre-Op History & Physical    Patient: Melanie Bullard                  MRN: [id number]          SSN: [id number]  YOB: 1965          Age: 46 y.o. Sex: male                Subjective:     Patient is a 46 y.o.  male who presents with history of hip pain has been bothering him on and off for years, gotten worse over the past five years. Pain located in left hip, groin and sometimes in the back. Sitting too long and getting up he feels like his leg may give out. Overuse makes the pain worse. He works on his feet all day on concrete so that will aggravate the pain. Pain ranges from 3/10 to 10/10. Sleep is affected r/t pain. He has failed Injections, tramadol, muscle relaxants, BC Powder and heat application. The patient was evaluated in the surgeon's office and it was determined that the most appropriate plan of care is to proceed with surgical intervention. Patient's PCP Chirag Murry MD      Past Medical History:   Diagnosis Date    Anxiety     Arrhythmia     palpitations- saw cardiology 01/13/2017    Depression     -patient denies this 12/13/2017    Dyslipidemia     GERD (gastroesophageal reflux disease)     barrets esophageus     Hemorrhoids, internal, with bleeding     Personal history of Sauz-Ldcjz-Zgvhfey disease     Sleep apnea       Past Surgical History:   Procedure Laterality Date    HX CARPAL TUNNEL RELEASE Bilateral     HX COLONOSCOPY      HX ENDOSCOPY  09/2014    HX TONSILLECTOMY        Prior to Admission medications    Medication Sig Start Date End Date Taking? Authorizing Provider   ASPIRIN/SALICYLAMIDE/CAFFEINE (BC HEADACHE POWDER PO) Take  by mouth as needed. Yes Historical Provider   PARoxetine (PAXIL) 20 mg tablet Take 1 Tab by mouth daily. Patient taking differently: Take 10 mg by mouth every evening. 7/20/17  Yes Chirag Murry MD   esomeprazole (NEXIUM) 20 mg capsule Take 40 mg by mouth every morning.    Yes Historical Provider aspirin 81 mg chewable tablet Take 1 tablet by mouth daily. 10/14/14  Yes Amanda Ortega MD     Current Outpatient Prescriptions   Medication Sig    ASPIRIN/SALICYLAMIDE/CAFFEINE (BC HEADACHE POWDER PO) Take  by mouth as needed.  PARoxetine (PAXIL) 20 mg tablet Take 1 Tab by mouth daily. (Patient taking differently: Take 10 mg by mouth every evening.)    esomeprazole (NEXIUM) 20 mg capsule Take 40 mg by mouth every morning.  aspirin 81 mg chewable tablet Take 1 tablet by mouth daily. No current facility-administered medications for this encounter. No Known Allergies   Social History   Substance Use Topics    Smoking status: Former Smoker     Packs/day: 0.25     Years: 8.00     Quit date:     Smokeless tobacco: Former User    Alcohol use 3.0 oz/week     5 Glasses of wine per week      History   Drug Use    Yes    Special: Marijuana     Comment: Every once in awhile     Family History   Problem Relation Age of Onset    Heart Disease Mother     Heart Attack Mother      induced by gastric bleeding    COPD Father     Alcohol abuse Brother     Stroke Brother     Arthritis-osteo Sister      Back surgery    Alcohol abuse Brother          Review of Systems    Patient denies difficulty swallowing, mouth sores, or loose teeth. Patient denies any recent dental procedures or any planned prior to surgery. Patient denies chest pain, tightness, pain radiating down left arm, palpitations. Denies dizziness, visual disturbances, or lightheadedness. Patient denies shortness of breath, wheezing, cough, fever, or chills. Patient denies diarrhea, constipation, or abdominal pain. Patient denies urinary problems including dysuria, hesitancy, urgency, or incontinence. Denies skin breakdown, rashes, insect bites or open area.          Objective:     Patient Vitals for the past 24 hrs:   Temp Pulse Resp BP SpO2   17 1354 98.9 °F (37.2 °C) 61 18 131/77 96 %     Temp (24hrs), Av.9 °F (37.2 °C), Min:98.9 °F (37.2 °C), Max:98.9 °F (37.2 °C)    Body mass index is 30.01 kg/(m^2). Wt Readings from Last 1 Encounters:   12/13/17 97.6 kg (215 lb 2.7 oz)        Physical Exam:     General: Pleasant,  cooperative, no apparent distress, appears stated age. Eyes: Conjunctivae/corneas clear. EOMs intact. Nose: Nares normal.   Mouth/Throat: Lips, mucosa, and tongue normal. Teeth and gums normal.   Lungs: Clear to auscultation bilaterally. Heart: Regular rate and rhythm, S1, S2 normal. No murmur, click, rub or gallop. Abdomen: Soft, non-tender. Bowel sounds normal. No distention. Musculoskeletal:  Gait antalgic   Extremities:  Extremities normal, atraumatic, no cyanosis or edema. Calves                                 supple, non tender to palpation. Pulses: 2+ and symmetric bilateral upper extremities. Cap. refill <2 seconds   Skin: Skin color, texture, turgor normal. No visible open areas, examined fully clothed   Neurologic: CN II-XII grossly intact. Alert and oriented x3. Labs:   Recent Results (from the past 72 hour(s))   CULTURE, MRSA    Collection Time: 12/13/17  1:59 PM   Result Value Ref Range    Special Requests: NO SPECIAL REQUESTS      Culture result: MRSA NOT PRESENT AT 12 HOURS     C REACTIVE PROTEIN, QT    Collection Time: 12/13/17  2:52 PM   Result Value Ref Range    C-Reactive protein <0.29 <0.60 mg/dL   CBC WITH AUTOMATED DIFF    Collection Time: 12/13/17  2:52 PM   Result Value Ref Range    WBC 5.2 4.1 - 11.1 K/uL    RBC 4.75 4.10 - 5.70 M/uL    HGB 15.5 12.1 - 17.0 g/dL    HCT 45.1 36.6 - 50.3 %    MCV 94.9 80.0 - 99.0 FL    MCH 32.6 26.0 - 34.0 PG    MCHC 34.4 30.0 - 36.5 g/dL    RDW 12.2 11.5 - 14.5 %    PLATELET 211 213 - 301 K/uL    NEUTROPHILS 50 32 - 75 %    LYMPHOCYTES 36 12 - 49 %    MONOCYTES 11 5 - 13 %    EOSINOPHILS 3 0 - 7 %    BASOPHILS 0 0 - 1 %    ABS. NEUTROPHILS 2.6 1.8 - 8.0 K/UL    ABS. LYMPHOCYTES 1.9 0.8 - 3.5 K/UL    ABS. MONOCYTES 0.5 0.0 - 1.0 K/UL    ABS. EOSINOPHILS 0.2 0.0 - 0.4 K/UL    ABS. BASOPHILS 0.0 0.0 - 0.1 K/UL   METABOLIC PANEL, COMPREHENSIVE    Collection Time: 12/13/17  2:52 PM   Result Value Ref Range    Sodium 141 136 - 145 mmol/L    Potassium 4.0 3.5 - 5.1 mmol/L    Chloride 102 97 - 108 mmol/L    CO2 32 21 - 32 mmol/L    Anion gap 7 5 - 15 mmol/L    Glucose 97 65 - 100 mg/dL    BUN 16 6 - 20 MG/DL    Creatinine 1.04 0.70 - 1.30 MG/DL    BUN/Creatinine ratio 15 12 - 20      GFR est AA >60 >60 ml/min/1.73m2    GFR est non-AA >60 >60 ml/min/1.73m2    Calcium 8.9 8.5 - 10.1 MG/DL    Bilirubin, total 0.4 0.2 - 1.0 MG/DL    ALT (SGPT) 37 12 - 78 U/L    AST (SGOT) 20 15 - 37 U/L    Alk.  phosphatase 90 45 - 117 U/L    Protein, total 7.0 6.4 - 8.2 g/dL    Albumin 4.2 3.5 - 5.0 g/dL    Globulin 2.8 2.0 - 4.0 g/dL    A-G Ratio 1.5 1.1 - 2.2     HEMOGLOBIN A1C WITH EAG    Collection Time: 12/13/17  2:52 PM   Result Value Ref Range    Hemoglobin A1c 5.4 4.2 - 6.3 %    Est. average glucose 108 mg/dL   SED RATE (ESR)    Collection Time: 12/13/17  2:52 PM   Result Value Ref Range    Sed rate, automated 2 0 - 20 mm/hr   TYPE & SCREEN    Collection Time: 12/13/17  2:52 PM   Result Value Ref Range    Crossmatch Expiration 12/27/2017     ABO/Rh(D) O POSITIVE     Antibody screen NEG    URINALYSIS W/ REFLEX CULTURE    Collection Time: 12/13/17  2:52 PM   Result Value Ref Range    Color YELLOW/STRAW      Appearance CLEAR CLEAR      Specific gravity 1.021 1.003 - 1.030      pH (UA) 6.0 5.0 - 8.0      Protein NEGATIVE  NEG mg/dL    Glucose NEGATIVE  NEG mg/dL    Ketone NEGATIVE  NEG mg/dL    Bilirubin NEGATIVE  NEG      Blood TRACE (A) NEG      Urobilinogen 1.0 0.2 - 1.0 EU/dL    Nitrites NEGATIVE  NEG      Leukocyte Esterase NEGATIVE  NEG      WBC 0-4 0 - 4 /hpf    RBC 0-5 0 - 5 /hpf    Epithelial cells FEW FEW /lpf    Bacteria NEGATIVE  NEG /hpf    UA:UC IF INDICATED CULTURE NOT INDICATED BY UA RESULT CNI      Hyaline cast 0-2 0 - 5 /lpf   EKG, 12 LEAD, INITIAL Collection Time: 12/13/17  3:07 PM   Result Value Ref Range    Ventricular Rate 60 BPM    Atrial Rate 60 BPM    P-R Interval 146 ms    QRS Duration 116 ms    Q-T Interval 416 ms    QTC Calculation (Bezet) 416 ms    Calculated P Axis 34 degrees    Calculated R Axis -40 degrees    Calculated T Axis 19 degrees    Diagnosis       Normal sinus rhythm  Left axis deviation  Abnormal ECG  When compared with ECG of 10-MAR-2016 08:58,  No significant change was found  Confirmed by Mohsen Suero MD, [id number] ([id number]) on 12/13/2017 3:47:05 PM         Assessment:     OA Left Hip    Plan:     Scheduled for Left Total Hip Arthroplasty. All labs reviewed and unremarkable. EKG reviewed.  MRSA & Transferrin pending    Prince Keenan NP

## 2017-12-14 LAB
BACTERIA SPEC CULT: NORMAL
BACTERIA SPEC CULT: NORMAL
SERVICE CMNT-IMP: NORMAL

## 2017-12-15 LAB — TRANSFERRIN SERPL-MCNC: 262 MG/DL (ref 200–370)

## 2017-12-15 NOTE — PROGRESS NOTES
Problem: Patient Education: Go to Patient Education Activity  Goal: Patient/Family Education  Outcome: Progressing Towards Goal  The patient attended the pre-operative joint replacement class. The content of the class, using a power-point presentation as well as visual demonstration was specific for patients undergoing total knee and total hip replacements. A pre-operative education booklet was provided to all patients. At the conclusion of class an opportunity was offered for any question or concerns the patient or family may have regarding their upcoming scheduled procedure. Patient attended class on 12/13/17, no  present. Pt for [facility] KAMRYN procedure.

## 2017-12-26 ENCOUNTER — ANESTHESIA EVENT (OUTPATIENT)
Dept: SURGERY | Age: 52
DRG: 470 | End: 2017-12-26
Payer: COMMERCIAL

## 2017-12-26 PROBLEM — M16.12 PRIMARY OSTEOARTHRITIS OF LEFT HIP: Status: ACTIVE | Noted: 2017-12-26

## 2017-12-27 ENCOUNTER — ANESTHESIA (OUTPATIENT)
Dept: SURGERY | Age: 52
DRG: 470 | End: 2017-12-27
Payer: COMMERCIAL

## 2017-12-27 ENCOUNTER — APPOINTMENT (OUTPATIENT)
Dept: GENERAL RADIOLOGY | Age: 52
DRG: 470 | End: 2017-12-27
Attending: ORTHOPAEDIC SURGERY
Payer: COMMERCIAL

## 2017-12-27 ENCOUNTER — HOSPITAL ENCOUNTER (INPATIENT)
Age: 52
LOS: 2 days | Discharge: HOME HEALTH | DRG: 470 | End: 2017-12-29
Attending: ORTHOPAEDIC SURGERY | Admitting: ORTHOPAEDIC SURGERY
Payer: COMMERCIAL

## 2017-12-27 DIAGNOSIS — M16.12 PRIMARY OSTEOARTHRITIS OF LEFT HIP: Primary | ICD-10-CM

## 2017-12-27 PROCEDURE — 77030011640 HC PAD GRND REM COVD -A: Performed by: ORTHOPAEDIC SURGERY

## 2017-12-27 PROCEDURE — 77030007866 HC KT SPN ANES BBMI -B

## 2017-12-27 PROCEDURE — 0SRB04A REPLACEMENT OF LEFT HIP JOINT WITH CERAMIC ON POLYETHYLENE SYNTHETIC SUBSTITUTE, UNCEMENTED, OPEN APPROACH: ICD-10-PCS | Performed by: ORTHOPAEDIC SURGERY

## 2017-12-27 PROCEDURE — 77030035236 HC SUT PDS STRATFX BARB J&J -B: Performed by: ORTHOPAEDIC SURGERY

## 2017-12-27 PROCEDURE — 74011250636 HC RX REV CODE- 250/636: Performed by: ANESTHESIOLOGY

## 2017-12-27 PROCEDURE — 76030000021 HC AMB SURG 2 TO 2.5 HR INTENSV-TIER 1: Performed by: ORTHOPAEDIC SURGERY

## 2017-12-27 PROCEDURE — 74011250636 HC RX REV CODE- 250/636: Performed by: PHYSICIAN ASSISTANT

## 2017-12-27 PROCEDURE — 74011250636 HC RX REV CODE- 250/636

## 2017-12-27 PROCEDURE — 77030020788: Performed by: ORTHOPAEDIC SURGERY

## 2017-12-27 PROCEDURE — C1776 JOINT DEVICE (IMPLANTABLE): HCPCS | Performed by: ORTHOPAEDIC SURGERY

## 2017-12-27 PROCEDURE — 77030018883 HC BLD SAW SAG4 STRY -B: Performed by: ORTHOPAEDIC SURGERY

## 2017-12-27 PROCEDURE — 74011000250 HC RX REV CODE- 250

## 2017-12-27 PROCEDURE — 77010033678 HC OXYGEN DAILY

## 2017-12-27 PROCEDURE — 77030020263 HC SOL INJ SOD CL0.9% LFCR 1000ML: Performed by: ORTHOPAEDIC SURGERY

## 2017-12-27 PROCEDURE — 74011000272 HC RX REV CODE- 272: Performed by: ORTHOPAEDIC SURGERY

## 2017-12-27 PROCEDURE — 77030029821: Performed by: ORTHOPAEDIC SURGERY

## 2017-12-27 PROCEDURE — 77030002916 HC SUT ETHLN J&J -A: Performed by: ORTHOPAEDIC SURGERY

## 2017-12-27 PROCEDURE — 77030002933 HC SUT MCRYL J&J -A: Performed by: ORTHOPAEDIC SURGERY

## 2017-12-27 PROCEDURE — 77030018836 HC SOL IRR NACL ICUM -A: Performed by: ORTHOPAEDIC SURGERY

## 2017-12-27 PROCEDURE — 74011250637 HC RX REV CODE- 250/637: Performed by: ANESTHESIOLOGY

## 2017-12-27 PROCEDURE — 97530 THERAPEUTIC ACTIVITIES: CPT

## 2017-12-27 PROCEDURE — 72170 X-RAY EXAM OF PELVIS: CPT

## 2017-12-27 PROCEDURE — 77030029829 HC TIB INST CKPNT DISP STRY -B: Performed by: ORTHOPAEDIC SURGERY

## 2017-12-27 PROCEDURE — 77030013708 HC HNDPC SUC IRR PULS STRY –B: Performed by: ORTHOPAEDIC SURGERY

## 2017-12-27 PROCEDURE — 76210000035 HC AMBSU PH I REC 1 TO 1.5 HR: Performed by: ORTHOPAEDIC SURGERY

## 2017-12-27 PROCEDURE — 74011250637 HC RX REV CODE- 250/637: Performed by: ORTHOPAEDIC SURGERY

## 2017-12-27 PROCEDURE — 97161 PT EVAL LOW COMPLEX 20 MIN: CPT

## 2017-12-27 PROCEDURE — 76060000064 HC AMB SURG ANES 2 TO 2.5 HR: Performed by: ORTHOPAEDIC SURGERY

## 2017-12-27 PROCEDURE — 74011000250 HC RX REV CODE- 250: Performed by: ORTHOPAEDIC SURGERY

## 2017-12-27 PROCEDURE — 77030020782 HC GWN BAIR PAWS FLX 3M -B

## 2017-12-27 PROCEDURE — 65270000029 HC RM PRIVATE

## 2017-12-27 PROCEDURE — 74011250636 HC RX REV CODE- 250/636: Performed by: ORTHOPAEDIC SURGERY

## 2017-12-27 PROCEDURE — 77030012935 HC DRSG AQUACEL BMS -B: Performed by: ORTHOPAEDIC SURGERY

## 2017-12-27 PROCEDURE — 77030031139 HC SUT VCRL2 J&J -A: Performed by: ORTHOPAEDIC SURGERY

## 2017-12-27 PROCEDURE — 77030010507 HC ADH SKN DERMBND J&J -B: Performed by: ORTHOPAEDIC SURGERY

## 2017-12-27 PROCEDURE — 77030018673: Performed by: ORTHOPAEDIC SURGERY

## 2017-12-27 PROCEDURE — 74011250637 HC RX REV CODE- 250/637: Performed by: PHYSICIAN ASSISTANT

## 2017-12-27 PROCEDURE — 74011000258 HC RX REV CODE- 258

## 2017-12-27 PROCEDURE — 77030032490 HC SLV COMPR SCD KNE COVD -B

## 2017-12-27 PROCEDURE — C1713 ANCHOR/SCREW BN/BN,TIS/BN: HCPCS | Performed by: ORTHOPAEDIC SURGERY

## 2017-12-27 DEVICE — COMPONENT HIP PRSS FT MTL ON CERM POLYETH X3: Type: IMPLANTABLE DEVICE | Site: HIP | Status: FUNCTIONAL

## 2017-12-27 DEVICE — HEAD FEM DIA36MM +10MM OFFSET HIP CO CHROM POLYETH TAPR LO: Type: IMPLANTABLE DEVICE | Site: HIP | Status: FUNCTIONAL

## 2017-12-27 RX ORDER — CEFAZOLIN SODIUM IN 0.9 % NACL 2 G/50 ML
2 INTRAVENOUS SOLUTION, PIGGYBACK (ML) INTRAVENOUS EVERY 8 HOURS
Status: COMPLETED | OUTPATIENT
Start: 2017-12-27 | End: 2017-12-28

## 2017-12-27 RX ORDER — OXYCODONE HYDROCHLORIDE 5 MG/1
10 TABLET ORAL
Status: COMPLETED | OUTPATIENT
Start: 2017-12-27 | End: 2017-12-27

## 2017-12-27 RX ORDER — PROPOFOL 10 MG/ML
INJECTION, EMULSION INTRAVENOUS
Status: DISCONTINUED | OUTPATIENT
Start: 2017-12-27 | End: 2017-12-27 | Stop reason: HOSPADM

## 2017-12-27 RX ORDER — CEFAZOLIN SODIUM IN 0.9 % NACL 2 G/50 ML
2 INTRAVENOUS SOLUTION, PIGGYBACK (ML) INTRAVENOUS ONCE
Status: COMPLETED | OUTPATIENT
Start: 2017-12-27 | End: 2017-12-27

## 2017-12-27 RX ORDER — DIPHENHYDRAMINE HYDROCHLORIDE 50 MG/ML
12.5 INJECTION, SOLUTION INTRAMUSCULAR; INTRAVENOUS AS NEEDED
Status: DISCONTINUED | OUTPATIENT
Start: 2017-12-27 | End: 2017-12-27 | Stop reason: HOSPADM

## 2017-12-27 RX ORDER — ALBUTEROL SULFATE 0.83 MG/ML
2.5 SOLUTION RESPIRATORY (INHALATION) AS NEEDED
Status: DISCONTINUED | OUTPATIENT
Start: 2017-12-27 | End: 2017-12-27 | Stop reason: HOSPADM

## 2017-12-27 RX ORDER — ONDANSETRON 2 MG/ML
4 INJECTION INTRAMUSCULAR; INTRAVENOUS AS NEEDED
Status: DISCONTINUED | OUTPATIENT
Start: 2017-12-27 | End: 2017-12-27 | Stop reason: HOSPADM

## 2017-12-27 RX ORDER — LIDOCAINE HYDROCHLORIDE 10 MG/ML
0.1 INJECTION, SOLUTION EPIDURAL; INFILTRATION; INTRACAUDAL; PERINEURAL AS NEEDED
Status: DISCONTINUED | OUTPATIENT
Start: 2017-12-27 | End: 2017-12-27 | Stop reason: HOSPADM

## 2017-12-27 RX ORDER — MIDAZOLAM HYDROCHLORIDE 1 MG/ML
INJECTION, SOLUTION INTRAMUSCULAR; INTRAVENOUS AS NEEDED
Status: DISCONTINUED | OUTPATIENT
Start: 2017-12-27 | End: 2017-12-27 | Stop reason: HOSPADM

## 2017-12-27 RX ORDER — NALOXONE HYDROCHLORIDE 0.4 MG/ML
0.4 INJECTION, SOLUTION INTRAMUSCULAR; INTRAVENOUS; SUBCUTANEOUS AS NEEDED
Status: DISCONTINUED | OUTPATIENT
Start: 2017-12-27 | End: 2017-12-29 | Stop reason: HOSPADM

## 2017-12-27 RX ORDER — ONDANSETRON 2 MG/ML
4 INJECTION INTRAMUSCULAR; INTRAVENOUS
Status: DISPENSED | OUTPATIENT
Start: 2017-12-27 | End: 2017-12-28

## 2017-12-27 RX ORDER — OXYCODONE HYDROCHLORIDE 5 MG/1
5 TABLET ORAL
Status: DISCONTINUED | OUTPATIENT
Start: 2017-12-27 | End: 2017-12-28

## 2017-12-27 RX ORDER — AMOXICILLIN 250 MG
1 CAPSULE ORAL 2 TIMES DAILY
Status: DISCONTINUED | OUTPATIENT
Start: 2017-12-27 | End: 2017-12-29 | Stop reason: HOSPADM

## 2017-12-27 RX ORDER — PREGABALIN 75 MG/1
75 CAPSULE ORAL ONCE
Status: COMPLETED | OUTPATIENT
Start: 2017-12-27 | End: 2017-12-27

## 2017-12-27 RX ORDER — SODIUM CHLORIDE 0.9 % (FLUSH) 0.9 %
5-10 SYRINGE (ML) INJECTION EVERY 8 HOURS
Status: DISCONTINUED | OUTPATIENT
Start: 2017-12-28 | End: 2017-12-29 | Stop reason: HOSPADM

## 2017-12-27 RX ORDER — PAROXETINE HYDROCHLORIDE 20 MG/1
10 TABLET, FILM COATED ORAL EVERY EVENING
Status: DISCONTINUED | OUTPATIENT
Start: 2017-12-27 | End: 2017-12-29 | Stop reason: HOSPADM

## 2017-12-27 RX ORDER — TRAMADOL HYDROCHLORIDE 50 MG/1
50 TABLET ORAL
Qty: 60 TAB | Refills: 0 | Status: SHIPPED | OUTPATIENT
Start: 2017-12-27 | End: 2018-08-27

## 2017-12-27 RX ORDER — ASPIRIN 325 MG
325 TABLET, DELAYED RELEASE (ENTERIC COATED) ORAL 2 TIMES DAILY
Qty: 60 TAB | Refills: 0 | Status: SHIPPED | OUTPATIENT
Start: 2017-12-27 | End: 2019-05-16 | Stop reason: SDUPTHER

## 2017-12-27 RX ORDER — ASPIRIN 325 MG
325 TABLET, DELAYED RELEASE (ENTERIC COATED) ORAL 2 TIMES DAILY
Status: DISCONTINUED | OUTPATIENT
Start: 2017-12-27 | End: 2017-12-29 | Stop reason: HOSPADM

## 2017-12-27 RX ORDER — POLYETHYLENE GLYCOL 3350 17 G/17G
17 POWDER, FOR SOLUTION ORAL DAILY
Status: DISCONTINUED | OUTPATIENT
Start: 2017-12-28 | End: 2017-12-29 | Stop reason: HOSPADM

## 2017-12-27 RX ORDER — CEFAZOLIN SODIUM IN 0.9 % NACL 2 G/50 ML
2 INTRAVENOUS SOLUTION, PIGGYBACK (ML) INTRAVENOUS ONCE
Status: DISCONTINUED | OUTPATIENT
Start: 2017-12-27 | End: 2017-12-27 | Stop reason: SDUPTHER

## 2017-12-27 RX ORDER — FENTANYL CITRATE 50 UG/ML
INJECTION, SOLUTION INTRAMUSCULAR; INTRAVENOUS AS NEEDED
Status: DISCONTINUED | OUTPATIENT
Start: 2017-12-27 | End: 2017-12-27 | Stop reason: HOSPADM

## 2017-12-27 RX ORDER — ACETAMINOPHEN 325 MG/1
650 TABLET ORAL
Status: DISCONTINUED | OUTPATIENT
Start: 2017-12-27 | End: 2017-12-29 | Stop reason: HOSPADM

## 2017-12-27 RX ORDER — SODIUM CHLORIDE, SODIUM LACTATE, POTASSIUM CHLORIDE, CALCIUM CHLORIDE 600; 310; 30; 20 MG/100ML; MG/100ML; MG/100ML; MG/100ML
125 INJECTION, SOLUTION INTRAVENOUS CONTINUOUS
Status: DISCONTINUED | OUTPATIENT
Start: 2017-12-27 | End: 2017-12-27 | Stop reason: HOSPADM

## 2017-12-27 RX ORDER — PANTOPRAZOLE SODIUM 40 MG/1
40 TABLET, DELAYED RELEASE ORAL
Status: DISCONTINUED | OUTPATIENT
Start: 2017-12-28 | End: 2017-12-29 | Stop reason: HOSPADM

## 2017-12-27 RX ORDER — OXYCODONE HCL 20 MG/1
20 TABLET, FILM COATED, EXTENDED RELEASE ORAL ONCE
Status: COMPLETED | OUTPATIENT
Start: 2017-12-27 | End: 2017-12-27

## 2017-12-27 RX ORDER — ACETAMINOPHEN 325 MG/1
975 TABLET ORAL ONCE
Status: COMPLETED | OUTPATIENT
Start: 2017-12-27 | End: 2017-12-27

## 2017-12-27 RX ORDER — BUPIVACAINE HYDROCHLORIDE 7.5 MG/ML
INJECTION, SOLUTION EPIDURAL; RETROBULBAR AS NEEDED
Status: DISCONTINUED | OUTPATIENT
Start: 2017-12-27 | End: 2017-12-27 | Stop reason: HOSPADM

## 2017-12-27 RX ORDER — OXYCODONE HYDROCHLORIDE 5 MG/1
5-10 TABLET ORAL
Qty: 70 TAB | Refills: 0 | Status: SHIPPED | OUTPATIENT
Start: 2017-12-27 | End: 2017-12-29

## 2017-12-27 RX ORDER — SODIUM CHLORIDE 0.9 % (FLUSH) 0.9 %
5-10 SYRINGE (ML) INJECTION AS NEEDED
Status: DISCONTINUED | OUTPATIENT
Start: 2017-12-27 | End: 2017-12-29 | Stop reason: HOSPADM

## 2017-12-27 RX ORDER — ACETAMINOPHEN 500 MG
500-1000 TABLET ORAL
Qty: 60 TAB | Refills: 0 | Status: SHIPPED | OUTPATIENT
Start: 2017-12-27 | End: 2018-08-27

## 2017-12-27 RX ORDER — SODIUM CHLORIDE 9 MG/ML
125 INJECTION, SOLUTION INTRAVENOUS CONTINUOUS
Status: DISPENSED | OUTPATIENT
Start: 2017-12-27 | End: 2017-12-28

## 2017-12-27 RX ORDER — OXYCODONE HYDROCHLORIDE 5 MG/1
10 TABLET ORAL
Status: DISCONTINUED | OUTPATIENT
Start: 2017-12-27 | End: 2017-12-28

## 2017-12-27 RX ORDER — ONDANSETRON 8 MG/1
4 TABLET, ORALLY DISINTEGRATING ORAL
Qty: 30 TAB | Refills: 0 | Status: SHIPPED | OUTPATIENT
Start: 2017-12-27 | End: 2018-08-27

## 2017-12-27 RX ORDER — FACIAL-BODY WIPES
10 EACH TOPICAL DAILY PRN
Status: DISCONTINUED | OUTPATIENT
Start: 2017-12-29 | End: 2017-12-29 | Stop reason: HOSPADM

## 2017-12-27 RX ORDER — SODIUM CHLORIDE, SODIUM LACTATE, POTASSIUM CHLORIDE, CALCIUM CHLORIDE 600; 310; 30; 20 MG/100ML; MG/100ML; MG/100ML; MG/100ML
150 INJECTION, SOLUTION INTRAVENOUS CONTINUOUS
Status: DISCONTINUED | OUTPATIENT
Start: 2017-12-27 | End: 2017-12-27 | Stop reason: HOSPADM

## 2017-12-27 RX ORDER — POVIDONE-IODINE 10 %
SOLUTION, NON-ORAL TOPICAL AS NEEDED
Status: DISCONTINUED | OUTPATIENT
Start: 2017-12-27 | End: 2017-12-27 | Stop reason: HOSPADM

## 2017-12-27 RX ORDER — HYDROMORPHONE HYDROCHLORIDE 2 MG/ML
0.5 INJECTION, SOLUTION INTRAMUSCULAR; INTRAVENOUS; SUBCUTANEOUS
Status: DISCONTINUED | OUTPATIENT
Start: 2017-12-27 | End: 2017-12-27 | Stop reason: HOSPADM

## 2017-12-27 RX ADMIN — SODIUM CHLORIDE 125 ML/HR: 900 INJECTION, SOLUTION INTRAVENOUS at 21:37

## 2017-12-27 RX ADMIN — OXYCODONE HYDROCHLORIDE 20 MG: 20 TABLET, FILM COATED, EXTENDED RELEASE ORAL at 08:49

## 2017-12-27 RX ADMIN — OXYCODONE HYDROCHLORIDE 10 MG: 5 TABLET ORAL at 14:52

## 2017-12-27 RX ADMIN — SODIUM CHLORIDE 125 ML/HR: 900 INJECTION, SOLUTION INTRAVENOUS at 14:30

## 2017-12-27 RX ADMIN — OXYCODONE HYDROCHLORIDE 5 MG: 5 TABLET ORAL at 19:34

## 2017-12-27 RX ADMIN — SODIUM CHLORIDE, SODIUM LACTATE, POTASSIUM CHLORIDE, AND CALCIUM CHLORIDE: 600; 310; 30; 20 INJECTION, SOLUTION INTRAVENOUS at 12:08

## 2017-12-27 RX ADMIN — FENTANYL CITRATE 50 MCG: 50 INJECTION, SOLUTION INTRAMUSCULAR; INTRAVENOUS at 11:46

## 2017-12-27 RX ADMIN — PROPOFOL 50 MCG/KG/MIN: 10 INJECTION, EMULSION INTRAVENOUS at 11:50

## 2017-12-27 RX ADMIN — SODIUM CHLORIDE, SODIUM LACTATE, POTASSIUM CHLORIDE, AND CALCIUM CHLORIDE: 600; 310; 30; 20 INJECTION, SOLUTION INTRAVENOUS at 11:26

## 2017-12-27 RX ADMIN — FENTANYL CITRATE 50 MCG: 50 INJECTION, SOLUTION INTRAMUSCULAR; INTRAVENOUS at 11:21

## 2017-12-27 RX ADMIN — CEFAZOLIN 2 G: 1 INJECTION, POWDER, FOR SOLUTION INTRAMUSCULAR; INTRAVENOUS; PARENTERAL at 11:57

## 2017-12-27 RX ADMIN — MIDAZOLAM HYDROCHLORIDE 1 MG: 1 INJECTION, SOLUTION INTRAMUSCULAR; INTRAVENOUS at 11:50

## 2017-12-27 RX ADMIN — SODIUM CHLORIDE, SODIUM LACTATE, POTASSIUM CHLORIDE, AND CALCIUM CHLORIDE 150 ML/HR: 600; 310; 30; 20 INJECTION, SOLUTION INTRAVENOUS at 08:48

## 2017-12-27 RX ADMIN — BUPIVACAINE HYDROCHLORIDE 2.8 ML: 7.5 INJECTION, SOLUTION EPIDURAL; RETROBULBAR at 11:32

## 2017-12-27 RX ADMIN — MIDAZOLAM HYDROCHLORIDE 2 MG: 1 INJECTION, SOLUTION INTRAMUSCULAR; INTRAVENOUS at 11:21

## 2017-12-27 RX ADMIN — DOCUSATE SODIUM AND SENNOSIDES 1 TABLET: 8.6; 5 TABLET, FILM COATED ORAL at 18:58

## 2017-12-27 RX ADMIN — MIDAZOLAM HYDROCHLORIDE 2 MG: 1 INJECTION, SOLUTION INTRAMUSCULAR; INTRAVENOUS at 11:46

## 2017-12-27 RX ADMIN — ACETAMINOPHEN 975 MG: 325 TABLET ORAL at 08:49

## 2017-12-27 RX ADMIN — CEFAZOLIN 2 G: 1 INJECTION, POWDER, FOR SOLUTION INTRAMUSCULAR; INTRAVENOUS; PARENTERAL at 18:58

## 2017-12-27 RX ADMIN — PREGABALIN 75 MG: 75 CAPSULE ORAL at 08:49

## 2017-12-27 RX ADMIN — ASPIRIN 325 MG: 325 TABLET, DELAYED RELEASE ORAL at 18:57

## 2017-12-27 RX ADMIN — PAROXETINE HYDROCHLORIDE 10 MG: 20 TABLET, FILM COATED ORAL at 18:58

## 2017-12-27 NOTE — PROGRESS NOTES
Problem: Mobility Impaired (Adult and Pediatric)  Goal: *Acute Goals and Plan of Care (Insert Text)  Physical Therapy Goals  Initiated 12/27/2017    1. Patient will move from supine to sit and sit to supine  in bed with modified independence within 4 days. 2. Patient will perform sit to stand with modified independence within 4 days. 3. Patient will ambulate with modified independence for 150 feet with the least restrictive device within 4 days. 4. Patient will ascend/descend 4 stairs with 1 handrail(s) with modified independence within 4 days. 5. Patient will verbalize and demonstrate understanding of Anterior Hip precautions per protocol within 4 days. 6. Patient will perform Anterior Hip home exercise program per protocol with modified independence within 4 days. physical Therapy EVALUATION  Patient: Aureliano Jhaveri (7 y.o. male)  Date: 12/27/2017  Primary Diagnosis: LEFT HIP PAIN  Primary osteoarthritis of left hip  Procedure(s) (LRB):  MAKOPLASTY LEFT TOTAL HIP ARTHROPLASTY DIRECT ANTERIOR (Left) Day of Surgery   Precautions:   WBAT, DNI, Total hip    ASSESSMENT :  Based on the objective data described below, the patient presents with decreased strength, ROM and balance following admission for Left anterior KAMRYN. Patient educated on anterior hip precautions and WBAT, he verbalized understanding. Patient instructed in LE exercises. Patient required MIN A for bed mobility for Left LE. Once in sitting patient with diaphoresis, increased dizziness and nausea. Vitals taken, but lower in general, patient required return to supine due to continued and worsening symptoms in sitting. Once in supine, improvement noted. Patient prior to admission did not require an assistive device, but states he owns a RW. He wishes to try axillary crutches for return to home. Patient would benefit from home health at discharge. .    Patient will benefit from skilled intervention to address the above impairments.   Patients rehabilitation potential is considered to be Good  Factors which may influence rehabilitation potential include:   [x]         None noted  []         Mental ability/status  []         Medical condition  []         Home/family situation and support systems  []         Safety awareness  []         Pain tolerance/management  []         Other:      PLAN :  Recommendations and Planned Interventions:  [x]           Bed Mobility Training             [x]    Neuromuscular Re-Education  [x]           Transfer Training                   []    Orthotic/Prosthetic Training  [x]           Gait Training                         []    Modalities  [x]           Therapeutic Exercises           []    Edema Management/Control  [x]           Therapeutic Activities            [x]    Patient and Family Training/Education  []           Other (comment):    Frequency/Duration: Patient will be followed by physical therapy  5 times a week to address goals. Discharge Recommendations: Home Health  Further Equipment Recommendations for Discharge: owns RW, patient requests axillary cruthces     SUBJECTIVE:   Patient stated Aileen Medina don't feel good.     OBJECTIVE DATA SUMMARY:   HISTORY:    Past Medical History:   Diagnosis Date    Anxiety     Arrhythmia     palpitations- saw cardiology 01/13/2017    Depression     -patient denies this 12/13/2017. states med is for Anxiety    Dyslipidemia     GERD (gastroesophageal reflux disease)     barrets esophageus     Hemorrhoids, internal, with bleeding     Personal history of Eqpv-Rxwcr-Qjtaikj disease     Sleep apnea      Past Surgical History:   Procedure Laterality Date    HX CARPAL TUNNEL RELEASE Bilateral     HX COLONOSCOPY      HX ENDOSCOPY  09/2014    HX TONSILLECTOMY       Prior Level of Function/Home Situation: see below  Personal factors and/or comorbidities impacting plan of care:     Home Situation  Home Environment: Private residence  # Steps to Enter: 3  Rails to Enter: Yes  [employer] : Bilateral (too far apart)  One/Two Story Residence: Two story, live on 1st floor  Living Alone: No  Support Systems: Spouse/Significant Other/Partner  Patient Expects to be Discharged to[de-identified] Private residence  Current DME Used/Available at Home: Walker, rolling    EXAMINATION/PRESENTATION/DECISION MAKING:   Critical Behavior:  Neurologic State: Drowsy, Appropriate for age           Hearing:     Skin:  All exposed intact  Edema: none noted  Range Of Motion:  AROM: Within functional limits           PROM: Within functional limits           Strength:    Strength: Generally decreased, functional                    Tone & Sensation:   Tone: Normal              Sensation: Intact               Coordination:  Coordination: Generally decreased, functional  Vision:      Functional Mobility:  Bed Mobility:  Rolling: Minimum assistance  Supine to Sit: Minimum assistance  Sit to Supine: Minimum assistance     Transfers:                             Balance:   Sitting: Intact  Ambulation/Gait Training:              Gait Description (WDL): Exceptions to WDL                                          Stairs: Therapeutic Exercises:       Functional Measure:  Barthel Index:    Bathin  Bladder: 10  Bowels: 10  Groomin  Dressin  Feeding: 10  Mobility: 5  Stairs: 0  Toilet Use: 5  Transfer (Bed to Chair and Back): 5  Total: 50       Barthel and G-code impairment scale:  Percentage of impairment CH  0% CI  1-19% CJ  20-39% CK  40-59% CL  60-79% CM  80-99% CN  100%   Barthel Score 0-100 100 99-80 79-60 59-40 20-39 1-19   0   Barthel Score 0-20 20 17-19 13-16 9-12 5-8 1-4 0      The Barthel ADL Index: Guidelines  1. The index should be used as a record of what a patient does, not as a record of what a patient could do. 2. The main aim is to establish degree of independence from any help, physical or verbal, however minor and for whatever reason. 3. The need for supervision renders the patient not independent.   4. A patient's performance should be established using the best available evidence. Asking the patient, friends/relatives and nurses are the usual sources, but direct observation and common sense are also important. However direct testing is not needed. 5. Usually the patient's performance over the preceding 24-48 hours is important, but occasionally longer periods will be relevant. 6. Middle categories imply that the patient supplies over 50 per cent of the effort. 7. Use of aids to be independent is allowed. Jose Baires., Barthel, D.W. (2003). Functional evaluation: the Barthel Index. [address] [facility] (14)2. Laura Otero dick AGAPITO RubyF, Kavita Blancas., Pita Flores., [place], [address] [facility] (1999). Measuring the change indisability after inpatient rehabilitation; comparison of the responsiveness of the Barthel Index and Functional [place] Measure. Journal of Neurology, Neurosurgery, and Psychiatry, 66(4), 197-135. Alla Perez, N.J.A, YOSHI Jay, & Misha Perkins MJaquanA. (2004.) Assessment of post-stroke quality of life in cost-effectiveness studies: The usefulness of the Barthel Index and the EuroQoL-5D. Quality of Life Research, 13, 914-53       G codes: In compliance with CMSs Claims Based Outcome Reporting, the following G-code set was chosen for this patient based on their primary functional limitation being treated: The outcome measure chosen to determine the severity of the functional limitation was the Barthel Index with a score of 50/100 which was correlated with the impairment scale.     ? Mobility - Walking and Moving Around:     - CURRENT STATUS: CK - 40%-59% impaired, limited or restricted    - GOAL STATUS: CJ - 20%-39% impaired, limited or restricted    - D/C STATUS:  ---------------To be determined---------------      Physical Therapy Evaluation Charge Determination   History Examination Presentation Decision-Making   MEDIUM  Complexity : 1-2 comorbidities / personal factors will impact the outcome/ POC  MEDIUM Complexity : 3 Standardized tests and measures addressing body structure, function, activity limitation and / or participation in recreation  LOW Complexity : Stable, uncomplicated  Other outcome measures Barthel Index  LOW       Based on the above components, the patient evaluation is determined to be of the following complexity level: LOW     Pain:  Pain Scale 1: Numeric (0 - 10)  Pain Intensity 1: 0              Activity Tolerance:   Fair- diaphoretic with activity  Please refer to the flowsheet for vital signs taken during this treatment. After treatment:   [x]         Patient left in no apparent distress sitting up in chair  []         Patient left in no apparent distress in bed  [x]         Call bell left within reach  [x]         Nursing notified  [x]         Caregiver present  []         Bed alarm activated    COMMUNICATION/EDUCATION:   The patients plan of care was discussed with: Registered Nurse. [x]         Fall prevention education was provided and the patient/caregiver indicated understanding. [x]         Patient/family have participated as able in goal setting and plan of care. [x]         Patient/family agree to work toward stated goals and plan of care. []         Patient understands intent and goals of therapy, but is neutral about his/her participation. []         Patient is unable to participate in goal setting and plan of care.     Thank you for this referral.  Naomie Mack, PT, DPT   Time Calculation: 18 mins

## 2017-12-27 NOTE — OP NOTES
OPERATIVE REPORT     Admit Date: 12/27/2017  Admit Diagnosis: LEFT HIP PAIN  Primary osteoarthritis of left hip  Preoperative Diagnosis: LEFT HIP PAIN  Postoperative Diagnosis: LEFT HIP PAIN    Procedure: Procedure(s):  MAKOPLASTY LEFT TOTAL HIP ARTHROPLASTY DIRECT ANTERIOR  Surgeon: Augusto Trevizo MD  Assistant(s): Maryjane Saldivar PA-C  Anesthesia: Regional   Estimated Blood Loss: 200cc  Specimens: * No specimens in log *   Complications: None        INDICATIONS:    The patient is a 46 y.o., male who has complained of a long history of left hip pain / groin pain. The patient has failed conservative treatment to include medical management / therapy and presents for definitive operative care. Informed consent was obtained including a discussion of the risks and benefits, which include, but are not limited to, bleeding, infection, neurovascular damage, wound complications, pain and stiffness in the hip, periprosthetic loosening, fracture, dislocation and venous thrombo-embolic disease, the patient consented for the procedure. DESCRIPTION OF PROCEDURE:       The proper side and hip were identified and signed in the preoperative holding area. All questions were answered. After adequate anesthesia, the hip was prepped and draped in sterile fashion. The contralateral tracking array was placed. The patient was positioned supine on the operating room table. A direct anterior approach was used through skin and the tensor fascia. The interval between the Tensor Fascia and Sartorius was used and retractors were placed in an atraumatic fashion. The lateral femoral circumflex artery and vein were identified and coagulated. The proximal and distal capsule was identified and the anterior capsule excised. A tracking array was placed in the proximal greater Trochanter and just superior to the acetabulum. The leg length and offset were verified with the MAKOplasty.  A standard neck cut was made according to the implant geometry. The femoral head was removed. Further soft tissue was debrided. Acetabular exposure was then obtained and the labrum was excised along with the foveal contents. Tracking points were established and acetabular mapping was performed. Once registration was complete and all soft tissues were removed the planned acetabular reamer was used. Adequate bone was resected according to the preop template. The final cup was then impacted in place with haptic guidance. The femur was then exposed, residual soft tissue was removed and the box osteotome was used along with the entry reamer to open the canal. Sequential broaching was started with the zero broach and broached up to the final implant size. The implant to the final implant size was placed and a trial reduction performed. Leg lengths and offset were verified. Fluoroscopy was used briefly to verify cup placement and stem size. The wound was copiously irrigated and the final head placed. Stability and leg lengths were assessed again and verified fluroscopically. The final implants were irrigated. The interval between the tensor and Sartorius was closed with 0-Vicryl, the sub-Q with 2-0 Vicryl, 4-0 monocryl and dermabond. The pin sites were closed with 4-0 Monocryl. A sterile dressing was applied. The patient was awoken from anesthesia and taken to the recovery room in a stable condiition. OPERATIVE FINDINGS : Severe DDH of the left hip with subluxation and coxa magna and severe OA of the hip. IMPLANTS :     Implant Name Type Inv.  Item Serial No.  Lot No. LRB No. Used Action   [employer] TRITANIUM HEMISPHERICAL CLUSTER HOLE SHELL 58MM ALPH CODE F   N/A [employer] [id number] Left 1 Implanted   [employer] TRIDENT POLYETHYLENE INSERT 0 DEGREE, 36MM, CODE F   N/A [employer] [id number] Left 1 Implanted   [employer] ACCOLADE  DEGREE NECK ANGLE HIP STEM, SIZE #6, NK LENGTH 35MM, STEM LENGTH 111MM, TPR V40   N/A [employer] [id number] Left 1 Implanted   HEAD FEM LFIT V40 36MM --  - SN/A   HEAD FEM LFIT V40 36MM --  N/A [employer] HOWM [id number] Left 1 Implanted       JUSTIFICATION FOR SURGICAL ASSISTANT:   Surgical Assistant, was requried and necessary in this case, to help with soft tissue retraction, extremity positioning, equiment management, implant management, and wound closure.       Haily Hurt MD

## 2017-12-27 NOTE — IP AVS SNAPSHOT
Tien Acosta 
 
 
 [address] [facility] 
[phone] Patient: Albin Balderas MRN: [id number] :1965 My Medications STOP taking these medications   
 aspirin 81 mg chewable tablet Replaced by:  aspirin delayed-release 325 mg tablet TAKE these medications as instructed Instructions Each Dose to Equal  
 Morning Noon Evening Bedtime  
 acetaminophen 500 mg tablet Commonly known as:  Marty Cunningham Jr [place] Your last dose was: Your next dose is: Take 1-2 Tabs by mouth every six (6) hours as needed for Pain. Not to exceed 4,000mg in any 24 hour period  Indications: Pain 500-1000 mg  
    
   
   
   
  
 aspirin delayed-release 325 mg tablet Your last dose was: Your next dose is: Take 1 Tab by mouth two (2) times a day. 325 mg  
    
   
   
   
  
 BC HEADACHE POWDER PO Your last dose was: Your next dose is: Take  by mouth as needed. eszopiclone 1 mg tablet Commonly known as:  Cole Lot Your last dose was: Your next dose is: Take 1 Tab by mouth nightly. Max Daily Amount: 1 mg. Indications: insomnia  
 1 mg HYDROcodone-acetaminophen 5-325 mg per tablet Commonly known as:  Birda Caswell Your last dose was: Your next dose is: Take 1-2 Tabs by mouth every four (4) hours as needed. Max Daily Amount: 12 Tabs. 1-2 Tab NexIUM 20 mg capsule Generic drug:  esomeprazole Your last dose was: Your next dose is: Take 40 mg by mouth every morning. 40 mg  
    
   
   
   
  
 ondansetron 8 mg disintegrating tablet Commonly known as:  ZOFRAN ODT Your last dose was: Your next dose is: Take 0.5 Tabs by mouth every eight (8) hours as needed for Nausea. 4 mg PARoxetine 20 mg tablet Commonly known as:  PAXIL Your last dose was: Your next dose is: Take 1 Tab by mouth daily. 20 mg  
    
   
   
   
  
 traMADol 50 mg tablet Commonly known as:  ULTRAM  
   
Your last dose was: Your next dose is: Take 1 Tab by mouth every six (6) hours as needed for Pain (Take for breakthrough pain if Oxycodone is not working). Max Daily Amount: 200 mg. Indications: Pain, Post-op Pain, Diagnosis Hip and Knee Arthritis ICD 10 - M16.9  
 50 mg Where to Get Your Medications Information on where to get these meds will be given to you by the nurse or doctor. ! Ask your nurse or doctor about these medications  
  acetaminophen 500 mg tablet  
 aspirin delayed-release 325 mg tablet  
 eszopiclone 1 mg tablet HYDROcodone-acetaminophen 5-325 mg per tablet  
 ondansetron 8 mg disintegrating tablet  
 traMADol 50 mg tablet

## 2017-12-27 NOTE — IP AVS SNAPSHOT
[address] [facility] 
 
 
 [address] 
[phone] Patient: Ashanti Alvarez MRN: [id number] :1965 About your hospitalization You were admitted on:  2017 You last received care in the:  SF 4M POST SURG ORT 2 You were discharged on:  2017 Why you were hospitalized Your primary diagnosis was:  Primary Osteoarthritis Of Left Hip Things You Need To Do (next 8 weeks) Follow up with [facility] [address] Phone:  [phone] Where:  [address] [facility], [address] [place] [id number]  TRANSITIONAL CARE MANAGEMENT with Emily Fleischer, MD at  3:45 PM  
Where:  [facility] Discharge Orders None A check eduardo indicates which time of day the medication should be taken. My Medications STOP taking these medications   
 aspirin 81 mg chewable tablet Replaced by:  aspirin delayed-release 325 mg tablet TAKE these medications as instructed Instructions Each Dose to Equal  
 Morning Noon Evening Bedtime  
 acetaminophen 500 mg tablet Commonly known as:  80 Jr Nany Williamson  Your last dose was: Your next dose is: Take 1-2 Tabs by mouth every six (6) hours as needed for Pain. Not to exceed 4,000mg in any 24 hour period  Indications: Pain 500-1000 mg  
    
   
   
   
  
 aspirin delayed-release 325 mg tablet Your last dose was: Your next dose is: Take 1 Tab by mouth two (2) times a day. 325 mg  
    
   
   
   
  
 BC HEADACHE POWDER PO Your last dose was: Your next dose is: Take  by mouth as needed. eszopiclone 1 mg tablet Commonly known as:  Claudene Monarch Your last dose was: Your next dose is: Take 1 Tab by mouth nightly. Max Daily Amount: 1 mg. Indications: insomnia  
 1 mg HYDROcodone-acetaminophen 5-325 mg per tablet Commonly known as:  Jeremy Bent Your last dose was: Your next dose is: Take 1-2 Tabs by mouth every four (4) hours as needed. Max Daily Amount: 12 Tabs. 1-2 Tab NexIUM 20 mg capsule Generic drug:  esomeprazole Your last dose was: Your next dose is: Take 40 mg by mouth every morning. 40 mg  
    
   
   
   
  
 ondansetron 8 mg disintegrating tablet Commonly known as:  ZOFRAN ODT Your last dose was: Your next dose is: Take 0.5 Tabs by mouth every eight (8) hours as needed for Nausea. 4 mg PARoxetine 20 mg tablet Commonly known as:  PAXIL Your last dose was: Your next dose is: Take 1 Tab by mouth daily. 20 mg  
    
   
   
   
  
 traMADol 50 mg tablet Commonly known as:  ULTRAM  
   
Your last dose was: Your next dose is: Take 1 Tab by mouth every six (6) hours as needed for Pain (Take for breakthrough pain if Oxycodone is not working). Max Daily Amount: 200 mg. Indications: Pain, Post-op Pain, Diagnosis Hip and Knee Arthritis ICD 10 - M16.9  
 50 mg Where to Get Your Medications Information on where to get these meds will be given to you by the nurse or doctor. ! Ask your nurse or doctor about these medications  
  acetaminophen 500 mg tablet  
 aspirin delayed-release 325 mg tablet  
 eszopiclone 1 mg tablet HYDROcodone-acetaminophen 5-325 mg per tablet  
 ondansetron 8 mg disintegrating tablet  
 traMADol 50 mg tablet Discharge Instructions TOTAL HIP DISCHARGE INSTRUCTIONS Patient: Jurgen Brooks MRN: [id number]  SSN: [id number] Please take the time to review the following instructions before you leave the hospital and use them as guidelines during your recovery from surgery. If you have any questions you may contact my office at [phone] ext [phone] or [phone]. After hours or during the weekend you may reach me personally at [phone] if there is an emergency. SPECIAL INSTRUCTIONS :  
1. Do not bend greater than 90 degrees at the hip for 4 weeks following your discharge 2. Avoid exercises or activities which bring the leg out or away from the mid-line of the body. The surgical repair involves this muscle and it will require 4 weeks to heal. You may disregard these instructions for a direct anterior approach. 3. You may walk as tolerated and are encouraged to work daily on progressing your activities with a walker initially. 4. You may transition to a cane for walking 5-7 days from surgery once you feel safe. You may use a walker for longer periods if you feel unstable. Wound Care/ Dressing Changes: DRESSING :  
 
Aquacel Dressing : This may be removed by home health 7 days after the date of your surgery. If there is no drainage, then a simple dressing may be used or no dressing at all. Other dressing options can be purchased over the counter at a local pharmacy or medical supply vendor. A porous adhesive dressing such as pictured above can be purchased at a local [employer] or [employer]. You only need to keep the incision covered for 7 days after showers. A dressing may be used for longer if there are issues with clothing clinging to the incision. Showering/ Bathing: You may shower with the aquacel dressing in place. This is left in place for 7 days following discharge from the hospital. If your incision is dry without drainage you may shower following your discharge home. After 7 days your aquacel dressing should be removed for showering.  It is fine to have water run over the incision. Do not vigorously scrub your incision. Apply a clean, dry dressing after you have dried your incision. Do not take a bath or get into a swimming pool / [place] until you follow up with Dr. Antonieta Quiroga. Do not soak your incision under water. If there is continued drainage or you are concerned contact Dr Ang [facility] office prior to showering [phone] ext [phone] or [phone]. Diet: 
You may advance to your regular diet as tolerated. Increase your clear liquid intake for the next 2-3 days. Medication: 
 
 
1. You will be given prescriptions for pain medication when you are discharged from the hospital. The side effects of these medications can be substantial and the narcotic medications are not mandatory. You may substitute these medications with Tylenol or Alleve / Motrin. 2.  Please use the medications as prescribed. Pain medications may cause constipation- Colace twice daily and Miralax one scoop daily while taking the narcotic medication should help prevent constipation. Please discuss with your local pharmacist regarding increasing this dosage if constipation persists. Other possible side effects of pain medication are dizziness, headache, nausea, vomiting, and urinary retention. Discontinue the pain medication if you develop itching, rash, shortness of breath, or difficulties swallowing. If these symptoms become severe or are not relieved by discontinuing the medication, you should seek immediate medical attention. 3. Refills of pain medication are authorized during office hours only (8 AM- 5 PM  Monday thru Friday). Many of these medication will require you or a family member to pick-up a physical prescription at the office. 4. Medications other than antiinflammatories will not be called into the pharmacy after business hours. 5. Do not take Tylenol/Acetaminophen in addition to your pain medication as most pain medications already contain this ingredient.   Do not exceed 4000mg of Tylenol/Acetaminophen per day. 6. You may resume the medication(s) you were taking prior to your surgery. Narcotics may change the effects of some antidepressant medication(s). If you have any questions about possible interactions between your regular medications and the pain medication, you should ask the pharmacist or contact the prescribing physician. 7. You will be discharged with prescriptions for additional pain medications (Tramadol or Toradol) and a medication for nausea and vomiting (Phenergan). You only need to fill these prescriptions if the primary pain medication is not working or you experiencing post-op nausea. 8. If you have constipation which is not improved by oral stool softeners then a Ducolax suppository should be purchased over the counter. 9. Continue the blood thinner (Aspirin or Lovenox) for a total of 30 days following surgery. Follow up appointment: 
 
Please call our office at [phone] for your follow up appointment. This should be scheduled 14 days following the date of surgery. Physical Therapy / Nursing: 
 
Physical Therapy following surgery will be arranged at home along with at home nursing care. They have specific instructions for rehab and wound care. .  
 
Returning to work: 
 
Normal return to work is 3-12 weeks following total hip replacement. Depending on your progression following surgery and specific job duties you may take longer for a full return to work. DRIVING You should not return to driving until you are off all narcotic pain medications and able to safely and quickly apply the brakes. This is normally 3-6 weeks for left hips and 4-8 weeks for right hip. Important Signs and Symptoms: 
 
If any of the following signs or symptoms occur, you should contact Dr. Ysabel Siddiqui office.   Please be advised if a problem arises which you feel requires immediate medical attention or you are unable to contact  [facility] office you should seek immediate medical attention at the ER or other health care facility you have access to. 
 
1. A sudden increase in swelling and/or redness or warmth at the area your surgery was performed which isnt relieved by rest, ice, and elevation. 2. Oral temperature greater than 101 degrees for 12 hours or more which isnt relieved by an increase in fluid intake and taking 2 Tylenol every 4-6 hours. 3. Excessive drainage from your incisions, or drainage which hasnt stopped by 72 hours after your surgery. 4. Fever, chills, shortness of breath, chest pain, nausea, vomiting or other signs and symptoms which are of concern to you. frequently asked questions ? What should I take for pain? 
o In general you will be discharged with three medications for pain (Extra Strength Tylenol, Oxycodone 5mg and Tramadol). This may vary slightly depending on what you were taking in the hospital.  
? 1st Line  Extra Strength Tylenol 1-2 tablets (500-1000mg) every 4-6 hrs. 
? After 2 days this dose should not exceed 8 tablets per day ? This is the first and only medication you need to take. Initially you may need 2 tablets every 4 hours, but as your pain subsides, this will taper to 1 tablet every 6 hours. ? 2nd Line - Tramadol 50mg (1 tablet) every 8 hours ? 3rd Line  Oxycodone 1 (5mg) - 2 (10mg) every 4 hours (Or as directed), take these between Percocet doses if your pain is not below 4 / 10. This may be needed only for several days following your discharge. ? 4th Line  Add Alleve 220mg every 12 hours or Motrin 400mg (200mg x 2) every 8 hours ? When should I call for advice regarding my pain? 
o After 12 hours on the above regimen, if nothing is working call the office ([phone] or [phone]) or call my cell after hours [phone]. 
? Can I get refills? 
o Narcotic refills are provided for the first 6 weeks following surgery. ? I will generally try to taper down to a single narcotic medication by your two week appointment. o Try Tylenol 650mg along with Alleve 220mg or Motrin 200mg during the majority of the day. ? Save the narcotic pain medications for physical therapy (1 hour prior) and before sleeping at night. ? Keep in mind you need to discontinue these medications prior to returning to driving. ? Is swelling normal? 
o Almost everyone has some degree of swelling following surgery. o Following hip and knee replacement surgery, swelling can be normal below the incision for the first 1-2 weeks. ? This swelling peaks around 5-7 days after surgery. ? You may have some bruising around the back of the thigh, calf and even into the foot. ? What should I do for the swelling? 
o Keep the limb elevated. o Apply compression socks (knee high for total knees and up to the mid-thigh for total hips.  
o Heat or ice may be applied, choose the modality that makes you the most comfortable. ? How long should I remain on blood thinners following surgery? 
o Thirty days ? When can I drive? 
o Once you have stopped using regular narcotic pain medications (Percocet, Lortab, etc.) and can safely apply the brakes without hesitation. ? When can I shower? o 72hours following surgery if the incision is dry. 
o No submersion of the incision, bathing or swimming for 14 days following surgery or until cleared by Dr Eda Brooke. ? What do I do with the dressing when I shower? 
o The dressing can be removed. o The incision is sealed with Dermabond (Biologic glue) and except for wounds which are draining should be watertight. ? How active should I be following surgery? o Progress activities in moderation at your own pace.  
o Walk each day and set progressive goals with small increments (1st week  ½ block of walking, 2nd week  1 block, 3rd week  2 blocks, etc.) Please do not hesitate to call me at [phone] (cell phone) for questions following surgery - Krissy Baker MD 
 
 
 
 
 
  
  
  
Introducing [facility]! Dear Ko Santiago: 
Thank you for requesting a [employer] account. Our records indicate that you have previously registered for a [employer] account but its currently inactive. Please call our [employer] support line at [phone]. Additional Information If you have questions, please visit the Frequently Asked Questions section of the [employer] website at [email or web address] Remember, [employer] is NOT to be used for urgent needs. For medical emergencies, dial 911. Now available from your iPhone and Android! Providers Seen During Your Hospitalization Provider Specialty Primary office phone Krissy Baker MD Orthopedic Surgery [phone] Your Primary Care Physician (PCP) Primary Care Physician Office Phone Office Fax Sanjuana Ratliff Rosalie Edwards 49 [phone] You are allergic to the following No active allergies Recent Documentation Height Weight BMI Smoking Status 1.803 m 95.8 kg 29.46 kg/m2 Former Smoker Emergency Contacts Name Discharge Info Relation Home Work Mobile [facility] CAREGIVER [3] Girlfriend [18]   [phone] Patient Belongings The following personal items are in your possession at time of discharge: 
  Dental Appliances: None  Visual Aid: Glasses             Clothing:  (denies valuables. clothing to locker) Please provide this summary of care documentation to your next provider. Signatures-by signing, you are acknowledging that this After Visit Summary has been reviewed with you and you have received a copy. Patient Signature:  ____________________________________________________________ Date:  ____________________________________________________________  
  
Alcira Anthony  Provider Signature: ____________________________________________________________ Date:  ____________________________________________________________

## 2017-12-27 NOTE — PROGRESS NOTES
POST ANESTHESIA CARE    DISCHARGE / TRANSFER NOTE    Luz Maria Plaster was   transfered   via   Bed     to hospital room 428     Patient was escorted by   nurse    . Patient verbalized   appreciation and was very pleased with care received   throughout their stay. Patient was discharged in     pleasant mood    . Pain at discharge/transfer was    0 /10. Discharge, medication and follow-up instructions were verbalized as understood prior to discharge  (if applicable for same-day procedures being discharged.)    All personal belongings have been returned to patient, and patient/family verbally confirm receiving belongings as all present. TRANSFER - OUT REPORT:    Verbal report given to   Julisa Groves RN  receiving   Luz Maria Plaster   and being transferred   to     [facility]    for routine post - op  Following Regional for Procedure(s) (LRB):  MAKOPLASTY LEFT TOTAL HIP ARTHROPLASTY DIRECT ANTERIOR (Left) by  Basil Haque MD.    Patient Situation, Background, Assessment and Recommendations (SBAR) was provided and included information from the following SBAR report(s) (SBAR, OR Summary and MAR) which were reviewed with the receiving nurse. Lines:   Peripheral IV 12/27/17 Right Forearm (Active)   Site Assessment Clean, dry, & intact 12/27/2017  3:41 PM   Phlebitis Assessment 0 12/27/2017  3:41 PM   Infiltration Assessment 0 12/27/2017  3:41 PM   Dressing Status Clean, dry, & intact 12/27/2017  3:41 PM   Dressing Type Transparent 12/27/2017  3:41 PM   Hub Color/Line Status Infusing 12/27/2017  3:41 PM      Also Reviewed (checked if applicable):   [] NO ADDITIONAL REVIEWS  [] PCA Drug and Settings     [x] Cold Therapy with extra gels     [] On-Q @  ml/hr   [] Drains x       [] Significant Wound care/devices (e.g. Wound vac, etc.)     [] Holding pt in PACU awaiting bed availability - additional care provided and eMAR review  [] Vent Settings      [] Critical Care Drips  Contact Precautions:  There are currently no Active Isolations  Patient transported with:  Registered Nurse    Additional Information: Spinal resolving - premptivly medicated prior to departure from PACU    After report, opportunity for questions and clarification was provided.     Signed: Hiram HOUSE RN-BC

## 2017-12-27 NOTE — ANESTHESIA POSTPROCEDURE EVALUATION
Post-Anesthesia Evaluation and Assessment    Patient: Ashanti Alvarez MRN: [id number]  SSN: [id number]    YOB: 1965  Age: 46 y.o. Sex: male       Cardiovascular Function/Vital Signs  Visit Vitals    /46    Pulse 70    Temp 36.8 °C (98.2 °F)    Resp 14    Ht 5' 11\" (1.803 m)    Wt 95.8 kg (211 lb 3.2 oz)    SpO2 98%    BMI 29.46 kg/m2       Patient is status post spinal anesthesia for Procedure(s):  MAKOPLASTY LEFT TOTAL HIP ARTHROPLASTY DIRECT ANTERIOR. Nausea/Vomiting: None    Postoperative hydration reviewed and adequate. Pain:  Pain Scale 1: Numeric (0 - 10) (12/27/17 1358)  Pain Intensity 1: 0 (12/27/17 1358)   Managed    Neurological Status:   Neuro (WDL): Exceptions to WDL (12/27/17 1358)  Neuro  Neurologic State: Drowsy; Appropriate for age (12/27/17 1358)  LLE Motor Response: Numbness; No movement to any stimulus; Pharmacologically paralyzed (12/27/17 1358)  RLE Motor Response: Numbness; No movement to any stimulus; Pharmacologically paralyzed (12/27/17 1358)   Block resolving at L1    Mental Status and Level of Consciousness: Arousable    Pulmonary Status:   O2 Device: Nasal cannula (12/27/17 1351)   Adequate oxygenation and airway patent    Complications related to anesthesia: None    Post-anesthesia assessment completed.  No concerns    Signed By: Danielito Fong DO     December 27, 2017

## 2017-12-27 NOTE — ANESTHESIA PREPROCEDURE EVALUATION
Anesthetic History   No history of anesthetic complications            Review of Systems / Medical History  Patient summary reviewed and nursing notes reviewed    Pulmonary        Sleep apnea: CPAP           Neuro/Psych             Comments: Anxiety/depression Cardiovascular                  Exercise tolerance: >4 METS     GI/Hepatic/Renal     GERD: well controlled          Comments: Hadley's esophagitis Endo/Other        Arthritis    Comments: Tdll-Sjafs-Pahcybo disease  Other Findings              Physical Exam    Airway  Mallampati: I    Neck ROM: normal range of motion   Mouth opening: Normal     Cardiovascular    Rhythm: regular  Rate: normal         Dental  No notable dental hx       Pulmonary  Breath sounds clear to auscultation               Abdominal         Other Findings            Anesthetic Plan    ASA: 2  Anesthesia type: spinal            Anesthetic plan and risks discussed with: Patient      Informed consent obtained.

## 2017-12-27 NOTE — PROGRESS NOTES
Physical Therapy:  Orders received and chart reviewed. Patient currently with slight motor and sensory on the Right (non-surgical) LE. We will continue to follow and proceed with evaluation once LE with improved motor control.   Thank you  Kim Burk PT,DPT,NCS

## 2017-12-27 NOTE — ANESTHESIA PROCEDURE NOTES
Spinal Block    Start time: 12/27/2017 11:21 AM  End time: 12/27/2017 11:32 AM  Performed by: Marsha Tsang  Authorized by: Mathieu Lugo     Pre-procedure:   Indications: at surgeon's request and primary anesthetic  Preanesthetic Checklist: patient identified, risks and benefits discussed, anesthesia consent, site marked, patient being monitored and timeout performed    Timeout Time: 11:21          Spinal Block:   Patient Position:  Seated  Prep Region:  Lumbar  Prep: Betadine      Location:  L2-3  Technique:  Single shot        Needle:   Needle Type:  Pencan  Needle Gauge:  25 G  Attempts:  2      Events: CSF confirmed, no blood with aspiration and no paresthesia        Assessment:  Insertion:  Uncomplicated  Patient tolerance:  Patient tolerated the procedure well with no immediate complications

## 2017-12-28 ENCOUNTER — HOME HEALTH ADMISSION (OUTPATIENT)
Dept: HOME HEALTH SERVICES | Facility: HOME HEALTH | Age: 52
End: 2017-12-28
Payer: COMMERCIAL

## 2017-12-28 LAB
ANION GAP SERPL CALC-SCNC: 6 MMOL/L (ref 5–15)
BUN SERPL-MCNC: 15 MG/DL (ref 6–20)
BUN/CREAT SERPL: 16 (ref 12–20)
CALCIUM SERPL-MCNC: 7.9 MG/DL (ref 8.5–10.1)
CHLORIDE SERPL-SCNC: 109 MMOL/L (ref 97–108)
CO2 SERPL-SCNC: 28 MMOL/L (ref 21–32)
CREAT SERPL-MCNC: 0.95 MG/DL (ref 0.7–1.3)
GLUCOSE SERPL-MCNC: 119 MG/DL (ref 65–100)
HGB BLD-MCNC: 11.3 G/DL (ref 12.1–17)
POTASSIUM SERPL-SCNC: 3.9 MMOL/L (ref 3.5–5.1)
SODIUM SERPL-SCNC: 143 MMOL/L (ref 136–145)

## 2017-12-28 PROCEDURE — 80048 BASIC METABOLIC PNL TOTAL CA: CPT | Performed by: ORTHOPAEDIC SURGERY

## 2017-12-28 PROCEDURE — 74011250637 HC RX REV CODE- 250/637: Performed by: ORTHOPAEDIC SURGERY

## 2017-12-28 PROCEDURE — 85018 HEMOGLOBIN: CPT | Performed by: ORTHOPAEDIC SURGERY

## 2017-12-28 PROCEDURE — 74011250637 HC RX REV CODE- 250/637: Performed by: PHYSICIAN ASSISTANT

## 2017-12-28 PROCEDURE — 65270000029 HC RM PRIVATE

## 2017-12-28 PROCEDURE — 51798 US URINE CAPACITY MEASURE: CPT

## 2017-12-28 PROCEDURE — 97535 SELF CARE MNGMENT TRAINING: CPT

## 2017-12-28 PROCEDURE — 97116 GAIT TRAINING THERAPY: CPT

## 2017-12-28 PROCEDURE — 74011250636 HC RX REV CODE- 250/636: Performed by: ORTHOPAEDIC SURGERY

## 2017-12-28 PROCEDURE — 77030011943

## 2017-12-28 PROCEDURE — 74011250637 HC RX REV CODE- 250/637: Performed by: NURSE PRACTITIONER

## 2017-12-28 PROCEDURE — 36415 COLL VENOUS BLD VENIPUNCTURE: CPT | Performed by: ORTHOPAEDIC SURGERY

## 2017-12-28 PROCEDURE — 97165 OT EVAL LOW COMPLEX 30 MIN: CPT

## 2017-12-28 PROCEDURE — 97530 THERAPEUTIC ACTIVITIES: CPT

## 2017-12-28 PROCEDURE — 97110 THERAPEUTIC EXERCISES: CPT

## 2017-12-28 RX ORDER — HYDROCODONE BITARTRATE AND ACETAMINOPHEN 10; 325 MG/1; MG/1
1 TABLET ORAL
Status: DISCONTINUED | OUTPATIENT
Start: 2017-12-28 | End: 2017-12-29 | Stop reason: HOSPADM

## 2017-12-28 RX ORDER — HYDROCODONE BITARTRATE AND ACETAMINOPHEN 5; 325 MG/1; MG/1
1 TABLET ORAL
Status: DISCONTINUED | OUTPATIENT
Start: 2017-12-28 | End: 2017-12-29 | Stop reason: HOSPADM

## 2017-12-28 RX ORDER — ESZOPICLONE 1 MG/1
1 TABLET, FILM COATED ORAL
Qty: 5 TAB | Refills: 0 | Status: SHIPPED | OUTPATIENT
Start: 2017-12-28 | End: 2018-08-27

## 2017-12-28 RX ADMIN — SODIUM CHLORIDE 125 ML/HR: 900 INJECTION, SOLUTION INTRAVENOUS at 06:41

## 2017-12-28 RX ADMIN — CEFAZOLIN 2 G: 1 INJECTION, POWDER, FOR SOLUTION INTRAMUSCULAR; INTRAVENOUS; PARENTERAL at 09:33

## 2017-12-28 RX ADMIN — ASPIRIN 325 MG: 325 TABLET, DELAYED RELEASE ORAL at 09:33

## 2017-12-28 RX ADMIN — OXYCODONE HYDROCHLORIDE 10 MG: 5 TABLET ORAL at 09:30

## 2017-12-28 RX ADMIN — DOCUSATE SODIUM AND SENNOSIDES 1 TABLET: 8.6; 5 TABLET, FILM COATED ORAL at 09:33

## 2017-12-28 RX ADMIN — OXYCODONE HYDROCHLORIDE 5 MG: 5 TABLET ORAL at 03:00

## 2017-12-28 RX ADMIN — HYDROCODONE BITARTRATE AND ACETAMINOPHEN 1 TABLET: 10; 325 TABLET ORAL at 21:55

## 2017-12-28 RX ADMIN — Medication 10 ML: at 21:55

## 2017-12-28 RX ADMIN — SODIUM CHLORIDE 125 ML/HR: 900 INJECTION, SOLUTION INTRAVENOUS at 13:55

## 2017-12-28 RX ADMIN — HYDROCODONE BITARTRATE AND ACETAMINOPHEN 1 TABLET: 10; 325 TABLET ORAL at 14:14

## 2017-12-28 RX ADMIN — HYDROCODONE BITARTRATE AND ACETAMINOPHEN 1 TABLET: 10; 325 TABLET ORAL at 17:50

## 2017-12-28 RX ADMIN — PAROXETINE HYDROCHLORIDE 10 MG: 20 TABLET, FILM COATED ORAL at 17:49

## 2017-12-28 RX ADMIN — Medication 10 ML: at 06:13

## 2017-12-28 RX ADMIN — PANTOPRAZOLE SODIUM 40 MG: 40 TABLET, DELAYED RELEASE ORAL at 06:13

## 2017-12-28 RX ADMIN — ASPIRIN 325 MG: 325 TABLET, DELAYED RELEASE ORAL at 17:49

## 2017-12-28 RX ADMIN — CEFAZOLIN 2 G: 1 INJECTION, POWDER, FOR SOLUTION INTRAMUSCULAR; INTRAVENOUS; PARENTERAL at 01:25

## 2017-12-28 RX ADMIN — POLYETHYLENE GLYCOL (3350) 17 G: 17 POWDER, FOR SOLUTION ORAL at 09:32

## 2017-12-28 RX ADMIN — OXYCODONE HYDROCHLORIDE 5 MG: 5 TABLET ORAL at 06:42

## 2017-12-28 RX ADMIN — DOCUSATE SODIUM AND SENNOSIDES 1 TABLET: 8.6; 5 TABLET, FILM COATED ORAL at 17:50

## 2017-12-28 NOTE — PROGRESS NOTES
Primary Nurse Khang Gurrola RN and Jeramy Rojas RN performed a dual skin assessment on this patient No impairment noted  Clayton score is 23

## 2017-12-28 NOTE — INTERDISCIPLINARY ROUNDS
[address]    Patient Information    Name: Jonh Felton  Age: 46 y.o. Admission Date: 12/27/2017  Surgery/Procedure: Procedure(s):  MAKOPLASTY LEFT TOTAL HIP ARTHROPLASTY DIRECT ANTERIOR  Attending Provider: Gonzalez Christie MD  Surgeon: Ingrid Santiago   Problem List: Principal Problem:    Primary osteoarthritis of left hip (12/26/2017)      Overview: LEFT TOTAL HIP REPLACEMENT - Fishmanji Lópezhmann - 12/27/17 ([facility])        During rounds the following quality care indicators and evidence based practices were addressed :       PT/OT: Patient mobility - Sat at edge of bed with PT, dizzy. Back in bed. (Stood with nursing staff afterwards, no dizziness). Bed Mobility Training  Rolling: Minimum assistance  Supine to Sit: Minimum assistance  Sit to Supine: Minimum assistance  Transfer Training  Sit to Stand:  (unable)                 Pain Assessment  Pain Intensity 1: 6 (pre PT) (12/28/17 0930)  Pain Location 1: Hip  Pain Intervention(s) 1: Medication (see MAR)  Patient Stated Pain Goal: 3    Pain meds: oxycodone  Antibiotics completed: Yes, Ancef  Anticoagulation: ASA bid  Bowel regimen: yes  Mechanical DVT prophylaxis: SCDs  [place]: N    RRAT Score:      Readmit Risk Tool  Support Systems: Spouse/Significant Other/Partner    Discharge Management/Planning:    Readmit Risk Assessment Information:   Low Risk            4       Total Score        2 . Living with Significant Other. Assisted Living. LTAC. SNF. or   Rehab    2 Charlson Comorbidity Score (Age + Comorbid Conditions)        Criteria that do not apply:    Has Seen PCP in Last 6 Months (Yes=3, No=0)    Patient Length of Stay (>5 days = 3)    IP Visits Last 12 Months (1-3=4, 4=9, >4=11)    Pt.  Coverage (Medicare=5 , Medicaid, or Self-Pay=4)         Readmit Risk Tool  Support Systems: Spouse/Significant Other/Partner    Anticipated Date of Discharge: tomorrow    Interdisciplinary team rounds were held with the following team members: Nurse Practitioner, Nursing, Pharmacy, and Rehab. See clinical pathway and/or care plan for interventions and desired outcomes.

## 2017-12-28 NOTE — PROGRESS NOTES
Problem: Self Care Deficits Care Plan (Adult)  Goal: *Acute Goals and Plan of Care (Insert Text)  Occupational Therapy Goals  Initiated 12/28/2017     1. Patient will perform lower body dressing using adaptive equipment as needed at modified independence level within 7 days. 2. Patient will perform toilet transfers at modified independence level within 7 days. 3. Patient will perform all aspects of toileting at modified independence level within 7 days. 4. Patient will demonstrate 3/3 hip precautions without cues within 7 days. Occupational Therapy EVALUATION  Patient: Miriam Cox (21 y.o. male)  Date: 12/28/2017  Primary Diagnosis: LEFT HIP PAIN  Primary osteoarthritis of left hip  Procedure(s) (LRB):  MAKOPLASTY LEFT TOTAL HIP ARTHROPLASTY DIRECT ANTERIOR (Left) 1 Day Post-Op   Precautions:  Fall    ASSESSMENT :  Cleared by RN to see pt for therapy session. Based on the objective data described below, the patient presents with decreased balance, decreased LLE ROM and strength and post-surgical pain following admission for L KAMRYN (anterior, makoplasty). He lives with his wife, previously I with ADLs and functional mobility. Works a physically demanding job as a . Pt received seated EOB with PT, pleasant and agreeable to participate. Since surgery pt has been becoming diaphoretic while sitting EOB and unable to perform further mobility. This session pt not diaphoretic and with no c/o dizziness, BP monitored and stable. Educated pt on anterior hip precautions and he recalled 2/3 without prompting. Stood to [employer] with min A-CGA and ambulated to bathroom, CGA and verbal cues for technique provided for toilet transfer. Performed brief standing grooming ADL with CGA and then transferred to chair. Began instruction on use of LB AE to assist with LB ADLs and pt demonstrated understanding by donning/doffing socks with min A using AE. Pt in chair at end of session, alarm set and wife present.  Instructed pt to call for assistance prior to getting up. Overall pt's functional performance limited by decreased LLE ROM and strength, decreased balance and pain. Anticipate he will progress well with therapy. Recommend HHOT at discharge. Patient will benefit from skilled intervention to address the above impairments. Patients rehabilitation potential is considered to be Good  Factors which may influence rehabilitation potential include:   [x]                None noted  []                Mental ability/status  []                Medical condition  []                Home/family situation and support systems  []                Safety awareness  []                Pain tolerance/management  []                Other:      PLAN :  Recommendations and Planned Interventions:  [x]                  Self Care Training                  [x]           Therapeutic Activities  [x]                  Functional Mobility Training    []           Cognitive Retraining  [x]                  Therapeutic Exercises           [x]           Endurance Activities  [x]                  Balance Training                   []           Neuromuscular Re-Education  []                  Visual/Perceptual Training     [x]      Home Safety Training  [x]                  Patient Education                 [x]           Family Training/Education  []                  Other (comment):    Frequency/Duration: Patient will be followed by occupational therapy 5 times a week to address goals. Discharge Recommendations: Home Health  Further Equipment Recommendations for Discharge: LB AE     SUBJECTIVE:   Patient stated I'm just happy to be up out of the bed.     OBJECTIVE DATA SUMMARY:   HISTORY:   Past Medical History:   Diagnosis Date    Anxiety     Arrhythmia     palpitations- saw cardiology 01/13/2017    Depression     -patient denies this 12/13/2017. states med is for Anxiety    Dyslipidemia     GERD (gastroesophageal reflux disease)     barrets esophageus     Hemorrhoids, internal, with bleeding     Personal history of Ehnb-Fnugw-Jvbffwb disease     Sleep apnea      Past Surgical History:   Procedure Laterality Date    HX CARPAL TUNNEL RELEASE Bilateral     HX COLONOSCOPY      HX ENDOSCOPY  09/2014    HX TONSILLECTOMY         Prior Level of Function/Environment/Context: He lives with his wife, previously I with ADLs and functional mobility. Works a physically demanding job as a . Home Situation  Home Environment: Private residence  # Steps to Enter: 3  Rails to Enter: Yes  Hand Rails : Bilateral (too far apart)  One/Two Story Residence: Two story, live on 1st floor  Lift Chair Available: No  Living Alone: No  Support Systems: Spouse/Significant Other/Partner  Patient Expects to be Discharged to[de-identified] Private residence  Current DME Used/Available at Home: Walker, rolling  Tub or Shower Type: Tub/Shower combination  []  Right hand dominant   [x]  Left hand dominant    EXAMINATION OF PERFORMANCE DEFICITS:  Cognitive/Behavioral Status:  Neurologic State: Alert  Orientation Level: Oriented X4  Cognition: Follows commands  Perception: Appears intact  Perseveration: No perseveration noted  Safety/Judgement: Awareness of environment;Good awareness of safety precautions; Fall prevention;Home safety    Hearing: Auditory  Auditory Impairment: None    Vision/Perceptual:        Acuity: Able to read clock/calendar on wall without difficulty    Corrective Lenses: Glasses    Range of Motion:  AROM: Within functional limits  PROM: Within functional limits     Strength:  Strength: Generally decreased, functional       Coordination:  Coordination: Within functional limits  Fine Motor Skills-Upper: Left Intact; Right Intact    Gross Motor Skills-Upper: Left Intact; Right Intact    Tone & Sensation:  Tone: Normal  Sensation: Intact       Balance:  Sitting: Intact  Standing: Intact; With support (RW)    Functional Mobility and Transfers for ADLs:  Bed Mobility:  Supine to Sit: Minimum assistance  Sit to Supine: Minimum assistance    Transfers:  Sit to Stand: Contact guard assistance;Minimum assistance  Stand to Sit: Contact guard assistance  Bed to Chair: Contact guard assistance  Toilet Transfer : Contact guard assistance    ADL Assessment:  Feeding: Independent    Oral Facial Hygiene/Grooming: Independent    Bathing: Moderate assistance    Upper Body Dressing: Independent    Lower Body Dressing: Moderate assistance    Toileting: Contact guard assistance     ADL Intervention and task modifications:    Educated pt on anterior hip precautions and he recalled 2/3 without prompting. Stood to [employer] with min A-CGA and ambulated to bathroom, CGA and verbal cues for technique provided for toilet transfer (to bring surgical leg forward and use grabbar to assist lowering and raising). Performed brief standing grooming ADL with instruction on safe use of RW during standing with CGA and then transferred to chair. Began instruction on use of LB AE to assist with LB ADLs and pt demonstrated understanding by donning/doffing socks with min A using AE (reacher and sock aid). Pt in chair at end of session, alarm set and wife present. Instructed pt to call for assistance prior to getting up. Grooming  Grooming Assistance: Contact guard assistance (in standing)  Washing Hands: Contact guard assistance    Lower Body Dressing Assistance  Socks: Minimum assistance  Position Performed: Seated in chair  Cues: Verbal cues provided; Tactile cues provided  Adaptive Equipment Used: Reacher;Sock aid         Cognitive Retraining  Safety/Judgement: Awareness of environment;Good awareness of safety precautions; Fall prevention;Home safety    Dressing joint: Patient instructed to don/doff LLE first.  Patient instructed and indicated understanding to don all clothing while sitting prior to standing, doff all clothing to knees while standing, then sit to doff clothing off from knees to feet in order to facilitate fall prevention, pain management, and energy conservation. Home safety: Patient instructed and indicated understanding on home modifications and safety (raise height of ADL objects, appropriate height of chair surfaces, recliner safety, change of floor surfaces, clear pathways) to increase independence and fall prevention. Standing: Patient instructed to walk up to sink/counter top/surfaces, step into walker to increase safety of joint and fall prevention. Instructed to apply concept to ADLs within the home (no reaching across body to L side, square off while using objects, slide objects along surfaces). Patient instructed to increase amount of time standing, observe standing position during ADLs in order to increase even weight bearing through bilateral LEs in order to increase independence with ADLs. Goal to be reached 30 days post - op, per orthopedic surgeon or per PT. Patient indicated understanding. Functional Measure:  Barthel Index:    Bathin  Bladder: 10  Bowels: 10  Groomin  Dressin  Feeding: 10  Mobility: 5  Stairs: 0  Toilet Use: 5  Transfer (Bed to Chair and Back): 10  Total: 60       Barthel and G-code impairment scale:  Percentage of impairment CH  0% CI  1-19% CJ  20-39% CK  40-59% CL  60-79% CM  80-99% CN  100%   Barthel Score 0-100 100 99-80 79-60 59-40 20-39 1-19   0   Barthel Score 0-20 20 17-19 13-16 9-12 5-8 1-4 0      The Barthel ADL Index: Guidelines  1. The index should be used as a record of what a patient does, not as a record of what a patient could do. 2. The main aim is to establish degree of independence from any help, physical or verbal, however minor and for whatever reason. 3. The need for supervision renders the patient not independent. 4. A patient's performance should be established using the best available evidence. Asking the patient, friends/relatives and nurses are the usual sources, but direct observation and common sense are also important.  However direct testing is not needed. 5. Usually the patient's performance over the preceding 24-48 hours is important, but occasionally longer periods will be relevant. 6. Middle categories imply that the patient supplies over 50 per cent of the effort. 7. Use of aids to be independent is allowed. Saima Cavazos., Barthel, D.W. (8046). Functional evaluation: the Barthel Index. [facility] (14)2. Segundo De Luna dick KIMO Ruby, Julio Mariano., Rhona Jaquez., [place], [facility] (1999). Measuring the change indisability after inpatient rehabilitation; comparison of the responsiveness of the Barthel Index and Functional [place] Measure. Journal of Neurology, Neurosurgery, and Psychiatry, 66(4), 299-032. Riana Plascencia, N.[place].KENJI, YOSHI Jya, & Christy Stevens M.A. (2004.) Assessment of post-stroke quality of life in cost-effectiveness studies: The usefulness of the Barthel Index and the EuroQoL-5D. Quality of Life Research, 13, 143-56       G codes: In compliance with CMSs Claims Based Outcome Reporting, the following G-code set was chosen for this patient based on their primary functional limitation being treated: The outcome measure chosen to determine the severity of the functional limitation was the Barthel Index with a score of 60/100 which was correlated with the impairment scale. ?  Self Care:     - CURRENT STATUS: CJ - 20%-39% impaired, limited or restricted    - GOAL STATUS: CI - 1%-19% impaired, limited or restricted    - D/C STATUS:  ---------------To be determined---------------     Occupational Therapy Evaluation Charge Determination   History Examination Decision-Making   LOW Complexity : Brief history review  LOW Complexity : 1-3 performance deficits relating to physical, cognitive , or psychosocial skils that result in activity limitations and / or participation restrictions  LOW Complexity : No comorbidities that affect functional and no verbal or physical assistance needed to complete eval tasks Based on the above components, the patient evaluation is determined to be of the following complexity level: LOW     Pain:  Pain Scale 1: Numeric (0 - 10)  Pain Intensity 1: 6 (pre PT)  Pain Location 1: Hip  Pain Orientation 1: Anterior  Pain Description 1: Aching  Pain Intervention(s) 1: Medication (see MAR)  Activity Tolerance:   Good  Please refer to the flowsheet for vital signs taken during this treatment. After treatment:   [x] Patient left in no apparent distress sitting up in chair  [] Patient left in no apparent distress in bed  [x] Call bell left within reach  [x] Nursing notified  [x] Caregiver present  [x] Chair alarm activated    COMMUNICATION/EDUCATION:   The patients plan of care was discussed with: Physical Therapist and Registered Nurse. [x]    Home safety education was provided and the patient/caregiver indicated understanding. [x]    Patient/family have participated as able in goal setting and plan of care. [x]    Patient/family agree to work toward stated goals and plan of care. []    Patient understands intent and goals of therapy, but is neutral about his/her participation. []    Patient is unable to participate in goal setting and plan of care. This patients plan of care is appropriate for delegation to \[facility] [place] [facility]\"".     Thank you for this referral.  Ace Tillman OT  Time Calculation: 30 mins

## 2017-12-28 NOTE — PROGRESS NOTES
.Bedside and Verbal shift change report given to Pallavi Lai (oncoming nurse) by Amna Jimenes (offgoing nurse). Report included the following information SBAR and MAR.

## 2017-12-28 NOTE — PROGRESS NOTES
Spiritual Care Assessment/Progress Notes    Jonh Plaster [id number]  [id number]    1965  46 y.o.  male    Patient Telephone Number: [phone] (home)   Episcopal Affiliation: Cas Bernard   Language: [place]   Extended Emergency Contact Information  Primary Emergency Contact: Coby Issa  Address: [address], [address] Phone: [phone]  Mobile Phone: [phone]  Relation: Girlfriend   Patient Active Problem List    Diagnosis Date Noted    Primary osteoarthritis of left hip 12/26/2017    Personal history of Wuou-Paqsk-Xshrzjf disease     Bleeding internal hemorrhoids 08/11/2017    H/O colonoscopy 06/27/2016    History of depression 01/22/2015    Overweight(278.02) 11/28/2014    Organic sleep apnea 11/28/2014    GERD (gastroesophageal reflux disease) 06/19/2014    Hadley's esophagus 06/19/2014        Date: 12/28/2017       Level of Episcopal/Spiritual Activity:  []         Involved in micheline tradition/spiritual practice    []         Not involved in micheline tradition/spiritual practice  [x]         Spiritually oriented    []         Claims no spiritual orientation    []         seeking spiritual identity  []         Feels alienated from Temple practice/tradition  []         Feels angry about Temple practice/tradition  []         Spirituality/Temple tradition a resource for coping at this time.   []         Not able to assess due to medical condition    Services Provided Today:  []         crisis intervention    []         reading Scriptures  [x]         spiritual assessment    []         prayer  [x]         empathic listening/emotional support  []         rites and rituals (cite in comments)  []         life review     []         Temple support  []         theological development   []         advocacy  []         ethical dialog     []         blessing  []         bereavement support    []         support to family  [] anticipatory grief support   [x]         help with AMD  []         spiritual guidance    []         meditation      Spiritual Care Needs  []         Emotional Support  []         Spiritual/Episcopal Care  []         Loss/Adjustment  []         Advocacy/Referral                /Ethics  [x]         No needs expressed at               this time  []         Other: (note in               comments)  Spiritual Care Plan  []         Follow up visits with               pt/family  []         Provide materials  []         Schedule sacraments  []         Contact Community               Clergy  [x]         Follow up as needed  []         Other: (note in               comments)     Comments:  responded to [facility] request for Advanced Medical Directive consult.  provided brief overview. Pt shared that he would like to complete the form on his own at a later time.  acknowledged pt's request and left the paperwork and Your Right To Decide booklet with the pt. He shared that he will contact chaplains if he has any questions or when he has completed the form. Advised him of  availability. Pt also shared that he has been feeling much better today then he did yesterday and was eager to be going home sometime soon. He shared about his family and how they are supportive. Pt seemed to be in good spirits at this moment.  assured him of continued support as needed/ desired. Jaylin Sher M.S.   Spiritual Care Department  If needs rise please call Lizzie-GIL ([phone])

## 2017-12-28 NOTE — PROGRESS NOTES
Problem: Falls - Risk of  Goal: *Absence of Falls  Document Gasper Fall Risk and appropriate interventions in the flowsheet.    Outcome: Progressing Towards Goal  Fall Risk Interventions:  Mobility Interventions: Patient to call before getting OOB         Medication Interventions: Patient to call before getting OOB    Elimination Interventions: Call light in reach, Patient to call for help with toileting needs

## 2017-12-28 NOTE — PROGRESS NOTES
12/28/2017     2:43 PM  CM received acceptance from [facility].     10:29 AM  CM received denial from [place] [address] [place] [address] as they are out of network with pt's insurance. CM completed referral to [facility], and is awaiting response. 10:16 AM  CM informed PCP's NN of pt's admission. 10:06 AM  CM met with pt for assessment. Demographics and PCP were confirmed. Pt is a 46year old,  male who lives in a private residence with his wife (3 exterior, 0 interior). PTA, pt was able to complete ADLs without the use of DME. Pt has a RW to assist during recovery, but requests to have crutches if possible. Pt has prescription drug coverage, and prefers [facility] at [facility]. Pt prefers At [address] [place] [address] for [facility] services. CM completed referral and is awaiting response. No additional discharge service needs indicated. CM will continue to monitor for finalized discharge recommendations. Sergio Cisneros MA    Care Management Interventions  PCP Verified by CM: Yes Susu Ybarra)  Palliative Care Criteria Met (RRAT>21 & CHF Dx)?: No  Mode of Transport at Discharge:  Other (see comment) (Girlfriend)  Transition of Care Consult (CM Consult): Home Health (At  [facility]  [facility] - INPATIENT: No  Reason Outside [place]: Physician referred to specific agency  MyChart Signup: No  Physical Therapy Consult: Yes  Occupational Therapy Consult: Yes  Speech Therapy Consult: No  Current Support Network: Lives with Spouse  Confirm Follow Up Transport: Family  Plan discussed with Pt/Family/Caregiver: Yes  Freedom of Choice Offered: Yes  Discharge Location  Discharge Placement: Home with home health

## 2017-12-28 NOTE — PROGRESS NOTES
TOTAL HIP ARTHROPLASTY DAILY NOTE     ASSESSMENT / PLAN :   1. Pain Control : Acceptable - Some pain at times, but the patient does not wish to increase their medications  2. Overnight Issues : Had some N/V, straight cath  3. Wound or incisional issue : Healing incision with no visible drainage  4. Therapy / Weight Bearing Recommendations : Weight bear as tolerated with use of a walker and two person assist while mobilizing  5. DVT Prophylaxis : Mechanical and Aspirin and mechanical lower extremity compression device  6. Disposition : Discharge to home with home health (maximum of 4 visits)  7. Medical Concerns : none  8. Comments : Stable, home today       POD  1 Day Post-Op s/p Procedure(s):  MAKOPLASTY LEFT TOTAL HIP ARTHROPLASTY DIRECT ANTERIOR     SUBJECTIVE :     Concerns : Stable. OBJECTIVE :     Vitals:    12/27/17 1829 12/27/17 1909 12/28/17 0305 12/28/17 0740   BP: 123/71 129/65 140/78 133/64   Pulse: 66 68 70 72   Resp: 16 16 17 18   Temp: 97.9 °F (36.6 °C) 98.2 °F (36.8 °C) 98 °F (36.7 °C) 98.1 °F (36.7 °C)   SpO2: 95% 95% 97% 98%   Weight:       Height:           Alert and oriented x3. left exam of the hip reveals that the dressing is clean, dry and intact. The patient is able to fire the quadriceps / flex at the hip  Sensation is intact to light touch. No calf pain.      Labs:  Recent Labs      12/28/17   0245   HGB  11.3*   NA  143   K  3.9   CL  109*   CO2  28   BUN  15   CREA  0.95   GLU  119*        Patient mobility           Jennifer Epstein MD  Cell [phone]  Medical Staff : Daly Zamora / Tomasz Watson  Office : [phone]

## 2017-12-28 NOTE — ROUTINE PROCESS
1925: Received report on patient, patient c/o left hip pain, medication given by off-going nurse. Will continue monitor, wife at the bedside. 2130: patien c/o nausea after repositioned to help wit voiding. Became diaphretic and dizzy, patient reposition. Repositioned nausea has decreased, does not need any medication at this time. /64 HR-59 O2 sat 95. Will continue to monitor, call bell in reach. 2345: patient has not void since admit to unit, bladder scanned >350.    0030: patient straight cath output >600.    0300: c/o n/v felt better after vomiting does not want nausea medication. Received pain medication for 3/10 hip pain. Will continue to monitor. 1396: Patient states he is unable to void in bed, because symptomatic with position changes. On-call PA call for new  Orders. 0463: Received return call from Marciano Sultana, orders to chago and Dr. Yelitza Giles will be in soon to assess patient. Bedside and Verbal shift change report given to Cr Almendarez (oncoming nurse) by Abi Aguilar (offgoing nurse). Report included the following information SBAR, Kardex, Intake/Output and MAR.

## 2017-12-28 NOTE — ACP (ADVANCE CARE PLANNING)
responded to [facility] request for Advanced Medical Directive consult.  provided brief overview. Pt shared that he would like to complete the form on his own at a later time.  acknowledged pt's request and left the paperwork and Your Right To Decide booklet with the pt. He shared that he will contact chaplains if he has any questions or when he has completed the form. Advised him of  availability. Rock Finn M.S.   Spiritual Care Department  If needs rise please call LISETH ([phone])

## 2017-12-28 NOTE — PROGRESS NOTES
Problem: Mobility Impaired (Adult and Pediatric)  Goal: *Acute Goals and Plan of Care (Insert Text)  Physical Therapy Goals  Initiated 12/27/2017    1. Patient will move from supine to sit and sit to supine  in bed with modified independence within 4 days. 2. Patient will perform sit to stand with modified independence within 4 days. 3. Patient will ambulate with modified independence for 150 feet with the least restrictive device within 4 days. 4. Patient will ascend/descend 4 stairs with 1 handrail(s) with modified independence within 4 days. 5. Patient will verbalize and demonstrate understanding of Anterior Hip precautions per protocol within 4 days. 6. Patient will perform Anterior Hip home exercise program per protocol with modified independence within 4 days. physical Therapy TREATMENT  Patient: Lashon Peres (89 y.o. male)  Date: 12/28/2017  Diagnosis: LEFT HIP PAIN  Primary osteoarthritis of left hip Primary osteoarthritis of left hip  Procedure(s) (LRB):  MAKOPLASTY LEFT TOTAL HIP ARTHROPLASTY DIRECT ANTERIOR (Left) 1 Day Post-Op  Precautions: Fall  ASSESSMENT:  Patient with improved activity tolerance with this session. Vitals were stable throughout, see below. Patient received sitting at edge of bed with nursing present. Patient is CGA for transfers, able to ambulate in room and hallway for 100 feet with RW and no loss of balance. Patient returned to sitting up in chair, performed LE exercises. Patient due to his medical complexity would benefit from additional PT. He will be ready for stair training tomorrow AM.  Progression toward goals:  [x]      Improving appropriately and progressing toward goals  []      Improving slowly and progressing toward goals  []      Not making progress toward goals and plan of care will be adjusted     PLAN:  Patient continues to benefit from skilled intervention to address the above impairments. Continue treatment per established plan of care.   Discharge Recommendations:  Home Health  Further Equipment Recommendations for Discharge:  rolling walker     SUBJECTIVE:   \"i feel so much better\"    OBJECTIVE DATA SUMMARY:   Vitals    Blood pressure   Heart rate (bpm)  Sitting 127/68   90  Post activity 133/64  96    Functional Mobility Training:  Bed Mobility:     Supine to Sit: Minimum assistance  Sit to Supine: Minimum assistance           Transfers:  Sit to Stand: Contact guard assistance  Stand to Sit: Contact guard assistance        Bed to Chair: Contact guard assistance                    Ambulation/Gait Training:  Distance (ft): 100 Feet (ft)  Assistive Device: Walker, rolling;Gait belt  Ambulation - Level of Assistance: Contact guard assistance        Gait Abnormalities: Antalgic                                      Stairs: Therapeutic Exercises:   Exercises performed per protocol. See morning treatment note for description. Pain:  Pain Scale 1: Numeric (0 - 10)  Pain Intensity 1: 6 (pre PT)  Pain Location 1: Hip  Pain Orientation 1: Anterior  Pain Description 1: Aching  Pain Intervention(s) 1: Medication (see MAR)  Activity Tolerance:   Good- no complications with upright activity  Please refer to the flowsheet for vital signs taken during this treatment.   After treatment:   [x] Patient left in no apparent distress sitting up in chair  [] Patient left in no apparent distress in bed  [x] Call bell left within reach  [x] Nursing notified  [x] Caregiver present  [x] Bed alarm activated    COMMUNICATION/COLLABORATION:   The patients plan of care was discussed with: Registered Nurse    Paige Avina, PT, DPT   Time Calculation: 29 mins

## 2017-12-28 NOTE — PROGRESS NOTES
Physical Therapy; Chart reviewed. Attempted to see patient, spoke with nursing. Nursing requesting defer until next scheduled pain medication.   We will re-attempt later this AM.  Thank you  Campos Moyer PT,DPT,NCS

## 2017-12-29 VITALS
OXYGEN SATURATION: 98 % | TEMPERATURE: 98 F | WEIGHT: 211.2 LBS | HEART RATE: 79 BPM | RESPIRATION RATE: 16 BRPM | BODY MASS INDEX: 29.57 KG/M2 | HEIGHT: 71 IN | DIASTOLIC BLOOD PRESSURE: 76 MMHG | SYSTOLIC BLOOD PRESSURE: 157 MMHG

## 2017-12-29 LAB — HGB BLD-MCNC: 11.4 G/DL (ref 12.1–17)

## 2017-12-29 PROCEDURE — 74011250637 HC RX REV CODE- 250/637: Performed by: ORTHOPAEDIC SURGERY

## 2017-12-29 PROCEDURE — 97535 SELF CARE MNGMENT TRAINING: CPT

## 2017-12-29 PROCEDURE — 74011250637 HC RX REV CODE- 250/637: Performed by: NURSE PRACTITIONER

## 2017-12-29 PROCEDURE — 74011250637 HC RX REV CODE- 250/637: Performed by: PHYSICIAN ASSISTANT

## 2017-12-29 PROCEDURE — 97116 GAIT TRAINING THERAPY: CPT

## 2017-12-29 PROCEDURE — 36415 COLL VENOUS BLD VENIPUNCTURE: CPT | Performed by: ORTHOPAEDIC SURGERY

## 2017-12-29 PROCEDURE — 85018 HEMOGLOBIN: CPT | Performed by: ORTHOPAEDIC SURGERY

## 2017-12-29 RX ORDER — HYDROCODONE BITARTRATE AND ACETAMINOPHEN 5; 325 MG/1; MG/1
1-2 TABLET ORAL
Qty: 70 TAB | Refills: 0 | Status: SHIPPED | OUTPATIENT
Start: 2017-12-29 | End: 2018-08-27

## 2017-12-29 RX ADMIN — Medication 10 ML: at 05:32

## 2017-12-29 RX ADMIN — HYDROCODONE BITARTRATE AND ACETAMINOPHEN 1 TABLET: 10; 325 TABLET ORAL at 08:19

## 2017-12-29 RX ADMIN — PANTOPRAZOLE SODIUM 40 MG: 40 TABLET, DELAYED RELEASE ORAL at 05:32

## 2017-12-29 RX ADMIN — HYDROCODONE BITARTRATE AND ACETAMINOPHEN 1 TABLET: 10; 325 TABLET ORAL at 12:10

## 2017-12-29 RX ADMIN — ASPIRIN 325 MG: 325 TABLET, DELAYED RELEASE ORAL at 08:19

## 2017-12-29 RX ADMIN — HYDROCODONE BITARTRATE AND ACETAMINOPHEN 1 TABLET: 10; 325 TABLET ORAL at 03:27

## 2017-12-29 RX ADMIN — POLYETHYLENE GLYCOL (3350) 17 G: 17 POWDER, FOR SOLUTION ORAL at 08:19

## 2017-12-29 RX ADMIN — DOCUSATE SODIUM AND SENNOSIDES 1 TABLET: 8.6; 5 TABLET, FILM COATED ORAL at 08:19

## 2017-12-29 NOTE — PROGRESS NOTES
TOTAL HIP ARTHROPLASTY DAILY NOTE     ASSESSMENT / PLAN :   1. Pain Control : Excellent - Able to sleep and participate with therapy  2. Overnight Issues : none  3. Wound or incisional issue : Healing incision with no visible drainage  4. Therapy / Weight Bearing Recommendations : Weight bear as tolerated with use of a walker and two person assist while mobilizing  5. DVT Prophylaxis : Mechanical and Aspirin and mechanical lower extremity compression device  6. Disposition : Discharge to home with home health (maximum of 4 visits)  7. Medical Concerns : none  8. Comments : Anticipate discharge home today after cleared by PT       POD  2 Days Post-Op s/p Procedure(s):  MAKOPLASTY LEFT TOTAL HIP ARTHROPLASTY DIRECT ANTERIOR     SUBJECTIVE :     Concerns : none. OBJECTIVE :     Vitals:    12/28/17 1521 12/28/17 1944 12/29/17 0044 12/29/17 0319   BP: 118/71 118/56 132/71 122/68   Pulse: 72 75 71 78   Resp: 18 18 16 18   Temp: 98.4 °F (36.9 °C) 99.6 °F (37.6 °C) 98.1 °F (36.7 °C) 98.8 °F (37.1 °C)   SpO2: 98% 95% 94% 97%   Weight:       Height:           Alert and oriented x3. left exam of the hip reveals that the dressing is clean, dry and intact. The patient is able to fire the quadriceps / flex at the hip  Sensation is intact to light touch. No calf pain.      Labs:  Recent Labs      12/29/17   0325  12/28/17   0245   HGB  11.4*  11.3*   NA   --   143   K   --   3.9   CL   --   109*   CO2   --   28   BUN   --   15   CREA   --   0.95   GLU   --   119*        Patient mobility  Gait  Gait Abnormalities: Antalgic  Ambulation - Level of Assistance: Contact guard assistance  Distance (ft): 100 Feet (ft)  Assistive Device: Walker, rolling, Gait belt        Paula Dominguez MD  Cell [phone]  Medical Staff : Trice Box / Yvette Martin  Office : [phone]

## 2017-12-29 NOTE — ROUTINE PROCESS
12/29/17  7:53AM  PCP RENETTA appt scheduled with Dr. Prudencio Diego on 1/5/18 at 3:45PM. Appt added to AVS. Satnam Ortega CM Specialist

## 2017-12-29 NOTE — PROGRESS NOTES
Problem: Mobility Impaired (Adult and Pediatric)  Goal: *Acute Goals and Plan of Care (Insert Text)  Physical Therapy Goals  Initiated 12/27/2017    1. Patient will move from supine to sit and sit to supine  in bed with modified independence within 4 days. 2. Patient will perform sit to stand with modified independence within 4 days. 3. Patient will ambulate with modified independence for 150 feet with the least restrictive device within 4 days. 4. Patient will ascend/descend 4 stairs with 1 handrail(s) with modified independence within 4 days. 5. Patient will verbalize and demonstrate understanding of Anterior Hip precautions per protocol within 4 days. 6. Patient will perform Anterior Hip home exercise program per protocol with modified independence within 4 days. physical Therapy TREATMENT  Patient: Maninder Cervantes (55 y.o. male)  Date: 12/29/2017  Diagnosis: LEFT HIP PAIN  Primary osteoarthritis of left hip Primary osteoarthritis of left hip  Procedure(s) (LRB):  MAKOPLASTY LEFT TOTAL HIP ARTHROPLASTY DIRECT ANTERIOR (Left) 2 Days Post-Op  Precautions: Fall, WBAT, Anterior Hip    ASSESSMENT:  Patient able to tolerate all activity without dizziness and vitals stable. Patient request use of axillary crutches for ambulation. Trial with axillary crutches, patient demonstrates safe and steady gait with use of axillary crutches, he was able to ambulate 300 feet MOD I. Patient instructed and transversed 4 steps with 1 rail and axillary crutch in opposite hand MOD I. No loss of balance. Verbal instruction on car transfers, he verbalized understanding. Patient is cleared to discharge home using axillary crutches with all upright activity, assist from family as needed, and follow up with home health PT.   Progression toward goals:  []      Improving appropriately and progressing toward goals  [x]      Improving slowly and progressing toward goals  []      Not making progress toward goals and plan of care will be adjusted     PLAN:  Patient continues to benefit from skilled intervention to address the above impairments. Continue treatment per established plan of care. Discharge Recommendations:  Home Health  Further Equipment Recommendations for Discharge:  Axillary crutches     SUBJECTIVE:   Patient stated .    OBJECTIVE DATA SUMMARY:   Critical Behavior:  Neurologic State: Alert  Orientation Level: Oriented X4  Cognition: Follows commands  Safety/Judgement: Awareness of environment, Good awareness of safety precautions, Fall prevention, Home safety  Functional Mobility Training:  Bed Mobility:                    Transfers:  Sit to Stand: Modified independent           Bed to Chair: Modified independent                    Balance:  Sitting: Intact  Standing: Intact; With support (RW)  Ambulation/Gait Training:  Distance (ft): 300 Feet (ft)  Assistive Device: Gait belt;Crutches  Ambulation - Level of Assistance: Modified independent                                               Stairs:  Number of Stairs Trained: 4  Stairs - Level of Assistance: Modified independent  Rail Use: Left  (axillary crutch in opposite hand)    Therapeutic Exercises:   SUPINE  EXERCISES   Sets   Reps   Active Active Assist   Passive Self ROM   Comments   Ankle Pumps 2 10 [x]                                        []                                        []                                        []                                           Quad Sets   []                                        []                                        []                                        []                                           Heel Slides 2 10 [x]                                        []                                        []                                        []                                           Hip Abduction   []                                        []                                        []                                        [] Glut Sets   []                                        []                                        []                                        []                                              []                                        []                                        []                                        []                                              []                                        []                                        []                                        []                                             STANDING  EXERCISES   Sets   Reps   Active Active Assist   Passive Self ROM   Comments   Heel Raises   []                                        []                                        []                                        []                                           Hip Abduction   []                                        []                                        []                                        []                                              []                                        []                                        []                                        []                                              []                                        []                                        []                                        []                                             Pain:  Pain Scale 1: Numeric (0 - 10)  Pain Intensity 1: 4  Pain Location 1: Hip  Pain Orientation 1: Left  Pain Description 1: Aching  Pain Intervention(s) 1: Medication (see MAR)  Activity Tolerance:   Good- no complications with upright activity  Please refer to the flowsheet for vital signs taken during this treatment.   After treatment:   [x] Patient left in no apparent distress sitting up in chair  [] Patient left in no apparent distress in bed  [x] Call bell left within reach  [x] Nursing notified  [x] Caregiver present  [] Bed alarm activated    COMMUNICATION/COLLABORATION:   The patients plan of care was discussed with: Registered Nurse    Jon Rivas, PT, DPT   Time Calculation: 17 mins

## 2017-12-29 NOTE — PROGRESS NOTES
12/29/2017  8:33 AM  Pt discharge noted. [facility], and they were notified of pt's discharge. No additional discharge services needed. Pt's girlfriend will provide transport upon discharge.

## 2017-12-29 NOTE — ROUTINE PROCESS
Bedside and Verbal shift change report given to Wilma Sharma (oncoming nurse) by Lawyer Bradly RN (offgoing nurse). Report included the following information SBAR, Kardex, OR Summary, Procedure Summary, Intake/Output, MAR and Recent Results.

## 2017-12-29 NOTE — PROGRESS NOTES
Problem: Self Care Deficits Care Plan (Adult)  Goal: *Acute Goals and Plan of Care (Insert Text)  Occupational Therapy Goals  Initiated 12/28/2017     1. Patient will perform lower body dressing using adaptive equipment as needed at modified independence level within 7 days. 2. Patient will perform toilet transfers at modified independence level within 7 days. 3. Patient will perform all aspects of toileting at modified independence level within 7 days. 4. Patient will demonstrate 3/3 hip precautions without cues within 7 days. Occupational Therapy TREATMENT//DisCHARGE  Patient: Mitchel Martinez (47 y.o. male)  Date: 12/29/2017  Diagnosis: LEFT HIP PAIN  Primary osteoarthritis of left hip Primary osteoarthritis of left hip  Procedure(s) (LRB):  MAKOPLASTY LEFT TOTAL HIP ARTHROPLASTY DIRECT ANTERIOR (Left) 2 Days Post-Op  Precautions: Fall    ASSESSMENT:  Pt received seated in chair, wife present, pleasant and agreeable to participate. Pt recalled 3/3 hip precautions without prompting this session. Educated pt on use of LB AE to assist with LB ADLs and where to find equipment, and pt performed LB dressing with modified independence-min A using equipment (A only to tie L shoe). Also educated pt on use of elastic shoe laces to increase independence. Stood to [place] with modified independence and ambulated to bathroom/performed toilet transfer with supervision. Performed trial x2 of toilet transfer to simulate home environment. Pt performed standing grooming ADLs at sink with supervision, no unsteadiness noted in static stand. Pt and wife with no further questions or concerns at end of session. Pt is cleared to return home from OT standpoint when medically ready. Recommend HHOT upon discharge.   Progression toward goals:  [x]          Improving appropriately and progressing toward goals  []          Improving slowly and progressing toward goals  []          Not making progress toward goals and plan of care will be adjusted     PLAN:  Patient will be discharged from occupational therapy at this time. Rationale for discharge:  [x]   Goals Achieved  []   [address]  []   Patient not participating in therapy  []   Other:  Discharge Recommendations:  Home Health   Further Equipment Recommendations for Discharge:  LB AE     SUBJECTIVE:   Patient stated I feel a lot better today.   The patient stated 3/3 hip precautions. Reviewed all 3 with patient. OBJECTIVE DATA SUMMARY:   Cognitive/Behavioral Status:  Neurologic State: Alert  Orientation Level: Oriented X4  Cognition: Follows commands  Safety/Judgement: Awareness of environment, Good awareness of safety precautions, Fall prevention, Home safety    Functional Mobility and Transfers for ADLs:  Bed Mobility:   Pt received in chair. Transfers:  Sit to Stand: Modified independent  Functional Transfers  Toilet Transfer : Supervision    Balance:  Sitting: Intact  Standing: Intact; With support (RW)    ADL Intervention and Instruction:  Educated pt on use of LB AE to assist with LB ADLs and where to find equipment, and pt performed LB dressing with modified independence-min A using equipment (A only to tie L shoe). Also educated pt on use of elastic shoe laces to increase independence. Performed trial x2 of toilet transfer to simulate home environment. Educated pt on safety with tub transfers and he verbalized understanding.   Feeding  Feeding Assistance: Independent    Grooming  Grooming Assistance: Supervision/set up (in standing)  Brushing Teeth: Supervision/set-up         Lower Body Dressing Assistance  Underpants: Modified independent  Pants With Elastic Waist: Modified independent  Socks: Modified independent  Shoes with Cloth Laces: Minimum assistance  Cues: Verbal cues provided  Adaptive Equipment Used: Reacher;Long handled shoe horn;Sock aid;Dressing stick         Cognitive Retraining  Safety/Judgement: Awareness of environment;Good awareness of safety precautions; Fall prevention;Home safety  Bathing: Patient instructed when bathing to not submerge wound in water, stand to shower or sponge bathe, cover wound with plastic and tape to ensure no water reaches bandage/wound without cues. Patient indicated understanding. Dressing joint: Patient instructed to don/doff LLE first.  Patient instructed to don all clothing while sitting prior to standing, doff all clothing to knees while standing, then sit to doff clothing off from knees to feet in order to facilitate fall prevention, pain management, and energy conservation. Patient indicated understanding/recalled strategies with minimal cues. Home safety: Patient instructed on home modifications and safety (raise height of ADL objects, appropriate height of chair surfaces, recliner safety, change of floor surfaces, clear pathways) to increase independence and fall prevention. Patient indicated understanding. Standing: Patient instructed to walk up to sink/counter top/surfaces, step into walker to increase safety of joint and fall prevention. Instructed to apply concept to ADLs within the home (no reaching across body to L side, square off while using objects, slide objects along surfaces). Patient instructed to increase amount of time standing, observe standing position during ADLs in order to increase even weight bearing through bilateral LEs in order to increase independence with ADLs. Goal to be reached 30 days post - op, per orthopedic surgeon or per PT. Patient indicated understanding. Tub transfer: Patient instructed regarding when it is safe to begin transfer into tub (complete stairs with PT, advance exercises with PT high enough to clear tub height). Patient instructed to use the same technique as used with stairs when entering and exiting tub (\"up with the non-surgical, down with the surgical leg\"). Patient indicated understanding.     Pain:  Pain Scale 1: Numeric (0 - 10)  Pain Intensity 1: 4  Pain Location 1: Hip  Pain Orientation 1: Left  Pain Description 1: Aching  Pain Intervention(s) 1: Medication (see MAR)  Activity Tolerance:   Good  Please refer to the flowsheet for vital signs taken during this treatment.   After treatment:   [x] Patient left in no apparent distress standing with PT  [] Patient left in no apparent distress in bed  [x] Call bell left within reach  [x] Nursing notified  [x] Caregiver present  [] Bed alarm activated    COMMUNICATION/COLLABORATION:   The patients plan of care was discussed with: Physical Therapist and Registered Nurse    Winthrop Goldmann, OT  Time Calculation: 24 mins

## 2017-12-29 NOTE — INTERDISCIPLINARY ROUNDS
[address]    Patient Information    Name: Carol Richter  Age: 46 y.o. Admission Date: 12/27/2017  Surgery/Procedure: Procedure(s):  MAKOPLASTY LEFT TOTAL HIP ARTHROPLASTY DIRECT ANTERIOR  Attending Provider: Esteban Thomas MD  Surgeon: Carlos Alberto Go   Problem List: Principal Problem:    Primary osteoarthritis of left hip (12/26/2017)      Overview: LEFT TOTAL HIP REPLACEMENT - Mansfielde Reach - 12/27/17 ([employer])      During rounds the following quality care indicators and evidence based practices were addressed :       PT/OT: Patient mobility - cleared for home discharge  Bed Mobility Training  Rolling: Minimum assistance  Supine to Sit: Minimum assistance  Sit to Supine: Minimum assistance  Transfer Training  Sit to Stand: Modified independent  Stand to Sit: Contact guard assistance  Bed to Chair: Modified independent      Gait Training  Assistive Device: Gait belt, Crutches  Ambulation - Level of Assistance: Modified independent  Distance (ft): 300 Feet (ft)  Stairs - Level of Assistance: Modified independent  Number of Stairs Trained: 4  Rail Use: Left  (axillary crutch in opposite hand)          Pain Assessment  Pain Intensity 1: 3 (12/29/17 0910)  Pain Location 1: Hip  Pain Intervention(s) 1: Medication (see MAR)  Patient Stated Pain Goal: 3    Pain meds: Norco  Antibiotics completed: Yes  Anticoagulation:  ASA  Bowel regimen: yes  Mechanical DVT prophylaxis: SCDs  [place]: N   Goals For Today: Progress with PT, pain control    RRAT Score:      Readmit Risk Tool  Support Systems: Spouse/Significant Other/Partner  Relationship with Primary Physician Group: Seen at least one time within the past 6 months    Discharge Management/Planning:    Readmit Risk Assessment Information:   Low Risk            7       Total Score        3 Has Seen PCP in Last 6 Months (Yes=3, No=0)    2 . Living with Significant Other. Assisted Living. LTAC. SNF.  or   Rehab    2 Charlson Comorbidity Score (Age + Comorbid Conditions)        Criteria that do not apply:    Patient Length of Stay (>5 days = 3)    IP Visits Last 12 Months (1-3=4, 4=9, >4=11)    Pt. Coverage (Medicare=5 , Medicaid, or Self-Pay=4)         Readmit Risk Tool  Support Systems: Spouse/Significant Other/Partner  Relationship with Primary Physician Group: Seen at least one time within the past 6 months    [address] [place] [address]: [address]:  Anticipated Date of Discharge: today  Barrier to Discharge (if any): none    Interdisciplinary team rounds were held with the following team members: Nurse Practitioner, Nursing, Pharmacy, and Rehab. See clinical pathway and/or care plan for interventions and desired outcomes.

## 2017-12-29 NOTE — DISCHARGE INSTRUCTIONS
TOTAL HIP DISCHARGE INSTRUCTIONS    Patient: Junaid Mott MRN: [id number]  SSN: [id number]              Please take the time to review the following instructions before you leave the hospital and use them as guidelines during your recovery from surgery. If you have any questions you may contact my office at [phone] ext [phone] or [phone]. After hours or during the weekend you may reach me personally at [phone] if there is an emergency. SPECIAL INSTRUCTIONS :   1. Do not bend greater than 90 degrees at the hip for 4 weeks following your discharge  2. Avoid exercises or activities which bring the leg out or away from the mid-line of the body. The surgical repair involves this muscle and it will require 4 weeks to heal. You may disregard these instructions for a direct anterior approach. 3. You may walk as tolerated and are encouraged to work daily on progressing your activities with a walker initially. 4. You may transition to a cane for walking 5-7 days from surgery once you feel safe. You may use a walker for longer periods if you feel unstable. Wound Care/ Dressing Changes:      DRESSING :     Aquacel Dressing : This may be removed by home health 7 days after the date of your surgery. If there is no drainage, then a simple dressing may be used or no dressing at all. Other dressing options can be purchased over the counter at a local pharmacy or medical supply vendor. A porous adhesive dressing such as pictured above can be purchased at a local [facility] or [employer]. You only need to keep the incision covered for 7 days after showers. A dressing may be used for longer if there are issues with clothing clinging to the incision. Showering/ Bathing: You may shower with the aquacel dressing in place. This is left in place for 7 days following discharge from the hospital. If your incision is dry without drainage you may shower following your discharge home.   After 7 days your aquacel dressing should be removed for showering. It is fine to have water run over the incision. Do not vigorously scrub your incision. Apply a clean, dry dressing after you have dried your incision. Do not take a bath or get into a swimming pool / [place] until you follow up with Dr. Erika Krishna. Do not soak your incision under water. If there is continued drainage or you are concerned contact Dr Tammy Hargrove office prior to showering [phone] ext [phone] or [phone]. Diet:  You may advance to your regular diet as tolerated. Increase your clear liquid intake for the next 2-3 days. Medication:      1. You will be given prescriptions for pain medication when you are discharged from the hospital. The side effects of these medications can be substantial and the narcotic medications are not mandatory. You may substitute these medications with Tylenol or Alleve / Motrin. 2.  Please use the medications as prescribed. Pain medications may cause constipation- Colace twice daily and Miralax one scoop daily while taking the narcotic medication should help prevent constipation. Please discuss with your local pharmacist regarding increasing this dosage if constipation persists. Other possible side effects of pain medication are dizziness, headache, nausea, vomiting, and urinary retention. Discontinue the pain medication if you develop itching, rash, shortness of breath, or difficulties swallowing. If these symptoms become severe or are not relieved by discontinuing the medication, you should seek immediate medical attention. 3. Refills of pain medication are authorized during office hours only (8 AM- 5 PM  Monday thru Friday). Many of these medication will require you or a family member to pick-up a physical prescription at the office. 4. Medications other than antiinflammatories will not be called into the pharmacy after business hours.    5. Do not take Tylenol/Acetaminophen in addition to your pain medication as most pain medications already contain this ingredient. Do not exceed 4000mg of Tylenol/Acetaminophen per day. 6. You may resume the medication(s) you were taking prior to your surgery. Narcotics may change the effects of some antidepressant medication(s). If you have any questions about possible interactions between your regular medications and the pain medication, you should ask the pharmacist or contact the prescribing physician. 7. You will be discharged with prescriptions for additional pain medications (Tramadol or Toradol) and a medication for nausea and vomiting (Phenergan). You only need to fill these prescriptions if the primary pain medication is not working or you experiencing post-op nausea. 8. If you have constipation which is not improved by oral stool softeners then a Ducolax suppository should be purchased over the counter. 9. Continue the blood thinner (Aspirin or Lovenox) for a total of 30 days following surgery. Follow up appointment:    Please call our office at [phone] for your follow up appointment. This should be scheduled 14 days following the date of surgery. Physical Therapy / Nursing:    Physical Therapy following surgery will be arranged at home along with at home nursing care. They have specific instructions for rehab and wound care. .     Returning to work:    Normal return to work is 3-12 weeks following total hip replacement. Depending on your progression following surgery and specific job duties you may take longer for a full return to work. DRIVING    You should not return to driving until you are off all narcotic pain medications and able to safely and quickly apply the brakes. This is normally 3-6 weeks for left hips and 4-8 weeks for right hip. Important Signs and Symptoms:    If any of the following signs or symptoms occur, you should contact Dr. Gayatri Salgado office.   Please be advised if a problem arises which you feel requires immediate medical attention or you are unable to contact Dr. Gayatri Salgado office you should seek immediate medical attention at the ER or other health care facility you have access to.    1. A sudden increase in swelling and/or redness or warmth at the area your surgery was performed which isnt relieved by rest, ice, and elevation. 2. Oral temperature greater than 101 degrees for 12 hours or more which isnt relieved by an increase in fluid intake and taking 2 Tylenol every 4-6 hours. 3. Excessive drainage from your incisions, or drainage which hasnt stopped by 72 hours after your surgery. 4. Fever, chills, shortness of breath, chest pain, nausea, vomiting or other signs and symptoms which are of concern to you. frequently asked questions   What should I take for pain?  o In general you will be discharged with three medications for pain (Extra Strength Tylenol, Oxycodone 5mg and Tramadol). This may vary slightly depending on what you were taking in the hospital.   - 1st Line - Extra Strength Tylenol 1-2 tablets (500-1000mg) every 4-6 hrs.  After 2 days this dose should not exceed 8 tablets per day   This is the first and only medication you need to take. Initially you may need 2 tablets every 4 hours, but as your pain subsides, this will taper to 1 tablet every 6 hours. - 2nd Line - Tramadol 50mg (1 tablet) every 8 hours  - 3rd Line - Oxycodone 1 (5mg) - 2 (10mg) every 4 hours (Or as directed), take these between Percocet doses if your pain is not below 4 / 10. This may be needed only for several days following your discharge. - 4th Line - Add Alleve 220mg every 12 hours or Motrin 400mg (200mg x 2) every 8 hours   When should I call for advice regarding my pain?  o After 12 hours on the above regimen, if nothing is working call the office [phone] or [phone]) or call my cell after hours [phone].  Can I get refills?  o Narcotic refills are provided for the first 6 weeks following surgery.    - I will generally try to taper down to a single narcotic medication by your two week appointment. o Try Tylenol 650mg along with Alleve 220mg or Motrin 200mg during the majority of the day. - Save the narcotic pain medications for physical therapy (1 hour prior) and before sleeping at night. - Keep in mind you need to discontinue these medications prior to returning to driving.  Is swelling normal?  o Almost everyone has some degree of swelling following surgery. o Following hip and knee replacement surgery, swelling can be normal below the incision for the first 1-2 weeks. - This swelling peaks around 5-7 days after surgery.   - You may have some bruising around the back of the thigh, calf and even into the foot.  What should I do for the swelling?  o Keep the limb elevated. o Apply compression socks (knee high for total knees and up to the mid-thigh for total hips.   o Heat or ice may be applied, choose the modality that makes you the most comfortable.  How long should I remain on blood thinners following surgery?  o Thirty days   When can I drive?  o Once you have stopped using regular narcotic pain medications (Percocet, Lortab, etc.) and can safely apply the brakes without hesitation.  When can I shower? o 72hours following surgery if the incision is dry.  o No submersion of the incision, bathing or swimming for 14 days following surgery or until cleared by Dr Geno Villatoro.  What do I do with the dressing when I shower?  o The dressing can be removed. o The incision is sealed with Dermabond (Biologic glue) and except for wounds which are draining should be watertight.  How active should I be following surgery?   o Progress activities in moderation at your own pace.   o Walk each day and set progressive goals with small increments (1st week - ½ block of walking, 2nd week - 1 block, 3rd week - 2 blocks, etc.)    Please do not hesitate to call me at [phone] (cell phone) for questions following surgery - Margareth Colin MD

## 2017-12-29 NOTE — PROGRESS NOTES
Problem: Falls - Risk of  Goal: *Absence of Falls  Document Gasper Fall Risk and appropriate interventions in the flowsheet.    Outcome: Progressing Towards Goal  Fall Risk Interventions:  Mobility Interventions: Bed/chair exit alarm, Patient to call before getting OOB, Utilize walker, cane, or other assitive device, Communicate number of staff needed for ambulation/transfer         Medication Interventions: Bed/chair exit alarm, Patient to call before getting OOB, Teach patient to arise slowly    Elimination Interventions: Bed/chair exit alarm, Call light in reach, Patient to call for help with toileting needs, Urinal in reach

## 2017-12-30 ENCOUNTER — HOME CARE VISIT (OUTPATIENT)
Dept: SCHEDULING | Facility: HOME HEALTH | Age: 52
End: 2017-12-30
Payer: COMMERCIAL

## 2017-12-30 VITALS
TEMPERATURE: 97.7 F | OXYGEN SATURATION: 99 % | SYSTOLIC BLOOD PRESSURE: 140 MMHG | DIASTOLIC BLOOD PRESSURE: 80 MMHG | RESPIRATION RATE: 16 BRPM

## 2017-12-30 PROCEDURE — G0299 HHS/HOSPICE OF RN EA 15 MIN: HCPCS

## 2017-12-30 PROCEDURE — G0151 HHCP-SERV OF PT,EA 15 MIN: HCPCS

## 2018-01-02 ENCOUNTER — PATIENT OUTREACH (OUTPATIENT)
Dept: OTHER | Age: 53
End: 2018-01-02

## 2018-01-02 NOTE — PROGRESS NOTES
RENETTA NOTE:     Mr. Gertrude Bernal  has been contacted regarding recent Surgical inpatient stay- Left [facility] - [place] . Verified  and address for HIPAA security. Explained role and verified PCP. Discharge medications were reviewed with the patient by telephone. * Mr. Gertrude Bernal is doing well at home. * Suture site dressing is C/D/I. No s/sx of infection. * Pain is reported as managed with pain medications. - He tried tramadol/ but he didn't like the way it made him feel. - Pain is reported as 3 - on 1-10 scale. - Taking pain meds every 8-10 hours. * Had headache this morning and took a BC powder - he realizes that that is equivelent to his ASA and wll not take that today. No other blood thinner prescribed. * Using crutches - he is more comfortable with crutches/  Used for 5 years in his early 25s due to the chronic hip disease. Runs in his family  Son had KAMRYN in his 25s as well.  (unknown name of disease process. )  - no raised BSC seat - he doesn't think he will need it. * Home health in place/  PT and SN. Dressing change planned for Friday. * Problems with constipation - used suppository with success this weekend. - eating light foods/ using colace - eating high fiber foods and hydrating. Date of  Admission:    [place]    Facility patient visited:   [facility]   Reason for Visit:   Planned L KAMRYN   Reviewed scripts given at [place]? Yes      Able to obtain prescribed medication? Yes   How is the patient feeling? Pt stated - doing well at home. Does the patient have any questions or problems? Not at this time   Any barriers with transportation? no   Follow-up Appointment date:  Calling today for apt. Reviewed Discharge instructions & Red Flags:   Reviewed discharge instructions per AVS.      Offered to assist patient in scheduling a follow up.  Patient states he will do this himself but thanked me for offering to help.     Provided patient with my name and contact information and instructed patient if there are any question to call. Advised that other people are on our team and follow up calls may be with myself or other staff. No further needs at this time. Call in 1 week to check on progress.

## 2018-01-02 NOTE — PATIENT INSTRUCTIONS
TOTAL HIP DISCHARGE INSTRUCTIONS     Patient: Lakesha Nichols MRN: [id number]  SSN: [id number]                   Please take the time to review the following instructions before you leave the hospital and use them as guidelines during your recovery from surgery. If you have any questions you may contact my office at [phone] ext [phone] or [phone]. After hours or during the weekend you may reach me personally at [phone] if there is an emergency.     SPECIAL INSTRUCTIONS :   1. Do not bend greater than 90 degrees at the hip for 4 weeks following your discharge  2. Avoid exercises or activities which bring the leg out or away from the mid-line of the body. The surgical repair involves this muscle and it will require 4 weeks to heal. You may disregard these instructions for a direct anterior approach. 3. You may walk as tolerated and are encouraged to work daily on progressing your activities with a walker initially. 4. You may transition to a cane for walking 5-7 days from surgery once you feel safe. You may use a walker for longer periods if you feel unstable.         Wound Care/ Dressing Changes:        DRESSING :     Aquacel Dressing : This may be removed by home health 7 days after the date of your surgery. If there is no drainage, then a simple dressing may be used or no dressing at all. Other dressing options can be purchased over the counter at a local pharmacy or medical supply vendor.        A porous adhesive dressing such as pictured above can be purchased at a local [facility] or [employer]. You only need to keep the incision covered for 7 days after showers. A dressing may be used for longer if there are issues with clothing clinging to the incision.            Showering/ Bathing:     You may shower with the aquacel dressing in place. This is left in place for 7 days following discharge from the hospital. If your incision is dry without drainage you may shower following your discharge home. After 7 days your aquacel dressing should be removed for showering. It is fine to have water run over the incision. Do not vigorously scrub your incision. Apply a clean, dry dressing after you have dried your incision. Do not take a bath or get into a swimming pool / [place] until you follow up with Dr. Gabrielle Watts. Do not soak your incision under water. If there is continued drainage or you are concerned contact Dr Siva Blackwell office prior to showering [phone] ext [phone] or [phone].      Diet:  You may advance to your regular diet as tolerated. Increase your clear liquid intake for the next 2-3 days.     Medication:        1. You will be given prescriptions for pain medication when you are discharged from the hospital. The side effects of these medications can be substantial and the narcotic medications are not mandatory. You may substitute these medications with Tylenol or Alleve / Motrin. 2.  Please use the medications as prescribed. Pain medications may cause constipation- Colace twice daily and Miralax one scoop daily while taking the narcotic medication should help prevent constipation. Please discuss with your local pharmacist regarding increasing this dosage if constipation persists. Other possible side effects of pain medication are dizziness, headache, nausea, vomiting, and urinary retention. Discontinue the pain medication if you develop itching, rash, shortness of breath, or difficulties swallowing. If these symptoms become severe or are not relieved by discontinuing the medication, you should seek immediate medical attention. 3. Refills of pain medication are authorized during office hours only (8 AM- 5 PM  Monday thru Friday). Many of these medication will require you or a family member to pick-up a physical prescription at the office. 4. Medications other than antiinflammatories will not be called into the pharmacy after business hours.    5. Do not take Tylenol/Acetaminophen in addition to your pain medication as most pain medications already contain this ingredient. Do not exceed 4000mg of Tylenol/Acetaminophen per day. 6. You may resume the medication(s) you were taking prior to your surgery. Narcotics may change the effects of some antidepressant medication(s). If you have any questions about possible interactions between your regular medications and the pain medication, you should ask the pharmacist or contact the prescribing physician. 7. You will be discharged with prescriptions for additional pain medications (Tramadol or Toradol) and a medication for nausea and vomiting (Phenergan). You only need to fill these prescriptions if the primary pain medication is not working or you experiencing post-op nausea. 8. If you have constipation which is not improved by oral stool softeners then a Ducolax suppository should be purchased over the counter. 9. Continue the blood thinner (Aspirin or Lovenox) for a total of 30 days following surgery.      Follow up appointment:     Please call our office at [phone] for your follow up appointment.  This should be scheduled 14 days following the date of surgery.

## 2018-01-03 ENCOUNTER — HOME CARE VISIT (OUTPATIENT)
Dept: SCHEDULING | Facility: HOME HEALTH | Age: 53
End: 2018-01-03
Payer: COMMERCIAL

## 2018-01-03 ENCOUNTER — HOME CARE VISIT (OUTPATIENT)
Dept: HOME HEALTH SERVICES | Facility: HOME HEALTH | Age: 53
End: 2018-01-03
Payer: COMMERCIAL

## 2018-01-03 VITALS
SYSTOLIC BLOOD PRESSURE: 122 MMHG | HEART RATE: 77 BPM | OXYGEN SATURATION: 98 % | RESPIRATION RATE: 16 BRPM | DIASTOLIC BLOOD PRESSURE: 84 MMHG | TEMPERATURE: 97.8 F

## 2018-01-03 PROCEDURE — G0151 HHCP-SERV OF PT,EA 15 MIN: HCPCS

## 2018-01-05 ENCOUNTER — HOME CARE VISIT (OUTPATIENT)
Dept: HOME HEALTH SERVICES | Facility: HOME HEALTH | Age: 53
End: 2018-01-05
Payer: COMMERCIAL

## 2018-01-05 VITALS
RESPIRATION RATE: 17 BRPM | TEMPERATURE: 98.8 F | SYSTOLIC BLOOD PRESSURE: 120 MMHG | HEART RATE: 70 BPM | DIASTOLIC BLOOD PRESSURE: 80 MMHG | OXYGEN SATURATION: 98 %

## 2018-01-05 PROCEDURE — G0151 HHCP-SERV OF PT,EA 15 MIN: HCPCS

## 2018-01-05 NOTE — DISCHARGE SUMMARY
@6GDWM@ [place] [address]   [address] [facility] [address]    DISCHARGE SUMMARY     Patient: Layo Chacon Record Number: [id number]                : 1965  Age: 46 y.o. Admit Date: 2017  Discharge Date: 2017    Admission Diagnosis: LEFT HIP PAIN  Primary osteoarthritis of left hip  Discharge Diagnosis: LEFT HIP PAIN    Procedures: Procedure(s):  MAKOPLASTY LEFT TOTAL HIP ARTHROPLASTY DIRECT ANTERIOR    Surgeon: Basil Haque MD  Assistants: Alireza Da Silva PA-C    Anesthesia: spinal  Complications: None     History of Present Illness:  Luz Maria Felton is a 46 y.o. male with a history of Left hip pain, swelling, and marked loss of function. Despite conservative management and after clinical and radiographic evaluation, it was determined that he suffered from end-stage osteoarthritis and would benefit from Procedure(s):  MAKOPLASTY LEFT TOTAL HIP ARTHROPLASTY DIRECT ANTERIOR, which he consented to undergo after a discussion of the risks, benefits, alternatives, rehab concerns, and potential complications of surgery. Hospital Course:  Luz Maria Felton tolerated the procedure well. He was transferred  to the recovery room in stable condition. After a brief stay the patient was then transferred to the Joint Replacement Unit at [place] [address]. On postoperative day #1, the dressing was clean and dry, he was neurovascularly intact. The patient was afebrile and vital signs were stable. Calves were soft and non-tender bilaterally. On postoperative day  # 2, the patient was tolerating a regular diet and making satisfactory progress with physical therapy. Hemoglobin and INR prior to discharge were   Lab Results   Component Value Date/Time    HGB 11.4 2017 03:25 AM       Luz Maria Felton was cleared by physical therapy and was discharged to Home in stable condition on postoperative day 2.   He was provided with routine postoperative instructions and advised to follow up in my office in 2 weeks following discharge from the hospital.  He was prescribed aspirin for DVT prophylaxis, tramadol and Norco for post-operative pain, dulcolax suppository for constipation, and zofran for nausea. Discharge Medications:  Cannot display discharge medications since this patient is not currently admitted.       Signed by: Luiz Moffett MD  1/5/2018

## 2018-01-07 VITALS
BODY MASS INDEX: 29.54 KG/M2 | RESPIRATION RATE: 18 BRPM | OXYGEN SATURATION: 98 % | DIASTOLIC BLOOD PRESSURE: 78 MMHG | HEART RATE: 78 BPM | TEMPERATURE: 97.9 F | WEIGHT: 211 LBS | SYSTOLIC BLOOD PRESSURE: 142 MMHG | HEIGHT: 71 IN

## 2018-01-09 ENCOUNTER — PATIENT OUTREACH (OUTPATIENT)
Dept: OTHER | Age: 53
End: 2018-01-09

## 2018-01-09 NOTE — PROGRESS NOTES
RENETTA follow up. Patient with surgical Inpatient stay for L [facility]  [place] . Chart review:  [facility]. Contacted patient for transitions of care services. Verified  and address for HIPAA security. * Mr. Emily Mckay reports that he was discharged from [facility] and graduated from therapy/  No referral to outpt therapy. * Some muscle soreness after all day, but very little pain. - He reports taking oxycodone about one every 12 hours. - He was able to clean out his fish tanks yesterday by himself. * Apt next week with MD for FU. * Insicions are C/D/I with glue on outer suture line. * No problems with ADL's. Friends and family support him well. - Cautioned not to over-do (like fish tank cleaning). - Encouraged exercises per PT, but no lifting.     - No going outside with ice and cold. * Discussed further CM/  The patient identifies no further need for CM, but appreciates my RENETTA calls. Will follow for [facility] services.

## 2018-01-30 ENCOUNTER — PATIENT OUTREACH (OUTPATIENT)
Dept: OTHER | Age: 53
End: 2018-01-30

## 2018-01-30 NOTE — PROGRESS NOTES
RENETTA  Wrap Up  Resolving current episode (Transitions of care complete). No further ED/UC or hospital admissions within 30 days post discharge. Patient attended follow-up appointments as directed. Final contact patient for transitions of care. Verified  and address for HIPAA security. * Mr. Sánchez Yeboah shares that he is back to work/ and he thinks he is 65%-70% recovered. * Some tightness/ but no pain. Biggest limitation is putting on his sock. He can manage, but it just takes a little more effort. * He has completed outpatient therapy and has one more FU apt with his ortho doc in mid February. * Patient declines continued CM. He noted comfort with his medical providers and no problems with care coordination. But he is open to future RENETTA if needed after ED or INPT stays. No outreach from patient to [address] [place] [address].

## 2018-04-06 DIAGNOSIS — F41.9 ANXIETY: ICD-10-CM

## 2018-04-08 RX ORDER — PAROXETINE HYDROCHLORIDE 20 MG/1
TABLET, FILM COATED ORAL
Qty: 90 TAB | Refills: 0 | Status: SHIPPED | OUTPATIENT
Start: 2018-04-08 | End: 2018-07-16 | Stop reason: SDUPTHER

## 2018-04-24 ENCOUNTER — OFFICE VISIT (OUTPATIENT)
Dept: FAMILY MEDICINE CLINIC | Age: 53
End: 2018-04-24

## 2018-04-24 VITALS
HEIGHT: 71 IN | SYSTOLIC BLOOD PRESSURE: 128 MMHG | OXYGEN SATURATION: 98 % | TEMPERATURE: 98.3 F | WEIGHT: 215 LBS | BODY MASS INDEX: 30.1 KG/M2 | RESPIRATION RATE: 16 BRPM | HEART RATE: 58 BPM | DIASTOLIC BLOOD PRESSURE: 81 MMHG

## 2018-04-24 DIAGNOSIS — H92.03 EAR DISCOMFORT, BILATERAL: Primary | ICD-10-CM

## 2018-04-24 NOTE — MR AVS SNAPSHOT
[address] 
[phone] Patient: Vasyl Acosta MRN: [id number] :[id number] Visit Information Date & Time Provider Department Dept. Phone Encounter #  
 2018  9:45 AM Soledad Germain MD [facility] [phone] [id number] Upcoming Health Maintenance Date Due FOBT Q 1 YEAR AGE 50-75 2017 Influenza Age 5 to Adult 2017 DTaP/Tdap/Td series (2 - Td) 2024 Allergies as of 2018  Review Complete On: 2018 By: Soledad Germain MD  
 No Known Allergies Current Immunizations  Reviewed on 2014 Name Date Tdap 2014 Not reviewed this visit You Were Diagnosed With   
  
 Codes Comments Ear discomfort, bilateral    -  Primary ICD-10-CM: H92.03 
ICD-9-CM: 388.70 Vitals BP Pulse Temp Resp Height(growth percentile) Weight(growth percentile) 128/81 (BP 1 Location: Right arm, BP Patient Position: Sitting) (!) 58 98.3 °F (36.8 °C) 16 5' 11\" (1.803 m) 215 lb (97.5 kg) SpO2 BMI Smoking Status 98% 29.99 kg/m2 Former Smoker Vitals History BMI and BSA Data Body Mass Index Body Surface Area  
 29.99 kg/m 2 2.21 m 2 Preferred Pharmacy Pharmacy Name Phone [id number] [address] [facility] [phone] Your Updated Medication List  
  
   
This list is accurate as of 18  3:12 PM.  Always use your most recent med list.  
  
  
  
  
 acetaminophen 500 mg tablet Commonly known as:  [address] Take 1-2 Tabs by mouth every six (6) hours as needed for Pain. Not to exceed 4,000mg in any 24 hour period  Indications: Pain  
  
 aspirin delayed-release 325 mg tablet Take 1 Tab by mouth two (2) times a day. eszopiclone 1 mg tablet Commonly known as:  Misa Gibbs Take 1 Tab by mouth nightly. Max Daily Amount: 1 mg. Indications: insomnia HYDROcodone-acetaminophen 5-325 mg per tablet Commonly known as:  Sebring No Take 1-2 Tabs by mouth every four (4) hours as needed. Max Daily Amount: 12 Tabs. NexIUM 20 mg capsule Generic drug:  esomeprazole Take 40 mg by mouth every morning. ondansetron 8 mg disintegrating tablet Commonly known as:  ZOFRAN ODT Take 0.5 Tabs by mouth every eight (8) hours as needed for Nausea. PARoxetine 20 mg tablet Commonly known as:  PAXIL TAKE 1 TABLET ORALLY ONCE DAILY polyethylene glycol 17 gram packet Commonly known as:  Staci Royalty Take 17 g by mouth daily. Indications: constipation  
  
 traMADol 50 mg tablet Commonly known as:  ULTRAM  
Take 1 Tab by mouth every six (6) hours as needed for Pain (Take for breakthrough pain if Oxycodone is not working). Max Daily Amount: 200 mg. Indications: Pain, Post-op Pain, Diagnosis Hip and Knee Arthritis ICD 10 - M16.9 Introducing [facility] SERVICES! Dear Johnie Spatz: 
Thank you for requesting a [employer] account. Our records indicate that you have previously registered for a [employer] account but its currently inactive. Please call our [employer] support line at [phone]. Additional Information If you have questions, please visit the Frequently Asked Questions section of the [employer] website at [email or web address] Remember, [employer] is NOT to be used for urgent needs. For medical emergencies, dial 911. Now available from your iPhone and Android! Please provide this summary of care documentation to your next provider. Your primary care clinician is listed as Katia Pastrana. If you have any questions after today's visit, please call [phone].

## 2018-04-24 NOTE — PROGRESS NOTES
Chief Complaint   Patient presents with    Ear Pain     Bilateral ear pain X 1 wk.  Pt states there is drainage and scabbing

## 2018-04-24 NOTE — PROGRESS NOTES
Terese Day is a 46 y.o. male who presents for bilateral ear discomfort for a couple of days. He noticed drainage first in the left and then in the right. No drainage currently. No pain but he felt a fullness and mild itching. The fullness and itching has resolved. He tried to clean his ears with Q-tips. No other self treatments. No congestion or coughing,  No fever. No hearing changes although he has some baseline hearing loss. No swimming. Reports mild, intermittent seasonal allergies but no treatments currently. PMHx:  Past Medical History:   Diagnosis Date    Anxiety     Arrhythmia     palpitations- saw cardiology 01/13/2017    Depression     -patient denies this 12/13/2017. states med is for Anxiety    Dyslipidemia     GERD (gastroesophageal reflux disease)     barrets esophageus     Hemorrhoids, internal, with bleeding     Personal history of Kyga-Pmtfy-Tulbruh disease     Sleep apnea        Meds:   Current Outpatient Prescriptions   Medication Sig Dispense Refill    PARoxetine (PAXIL) 20 mg tablet TAKE 1 TABLET ORALLY ONCE DAILY 90 Tab 0    esomeprazole (NEXIUM) 20 mg capsule Take 40 mg by mouth every morning.  polyethylene glycol (MIRALAX) 17 gram packet Take 17 g by mouth daily. Indications: constipation      HYDROcodone-acetaminophen (NORCO) 5-325 mg per tablet Take 1-2 Tabs by mouth every four (4) hours as needed. Max Daily Amount: 12 Tabs. 70 Tab 0    eszopiclone (LUNESTA) 1 mg tablet Take 1 Tab by mouth nightly. Max Daily Amount: 1 mg. Indications: insomnia 5 Tab 0    aspirin delayed-release 325 mg tablet Take 1 Tab by mouth two (2) times a day. 60 Tab 0    traMADol (ULTRAM) 50 mg tablet Take 1 Tab by mouth every six (6) hours as needed for Pain (Take for breakthrough pain if Oxycodone is not working). Max Daily Amount: 200 mg.  Indications: Pain, Post-op Pain, Diagnosis Hip and Knee Arthritis ICD 10 - M16.9 60 Tab 0    acetaminophen (TYLENOL EXTRA STRENGTH) 500 mg tablet Take 1-2 Tabs by mouth every six (6) hours as needed for Pain. Not to exceed 4,000mg in any 24 hour period  Indications: Pain 60 Tab 0    ondansetron (ZOFRAN ODT) 8 mg disintegrating tablet Take 0.5 Tabs by mouth every eight (8) hours as needed for Nausea. 30 Tab 0       Allergies:   No Known Allergies    Smoker:  History   Smoking Status    Former Smoker    Packs/day: 0.25    Years: 8.00    Quit date: 1981   Smokeless Tobacco    Former User       ETOH:   History   Alcohol Use    3.0 oz/week    5 Glasses of wine per week       FH:   Family History   Problem Relation Age of Onset    Heart Disease Mother     Heart Attack Mother      induced by gastric bleeding    COPD Father     Alcohol abuse Brother     Stroke Brother     Arthritis-osteo Sister      Back surgery    Alcohol abuse Brother        ROS:  Per HPI    Physical Exam:  Visit Vitals    /81 (BP 1 Location: Right arm, BP Patient Position: Sitting)    Pulse (!) 58    Temp 98.3 °F (36.8 °C)    Resp 16    Ht 5' 11\" (1.803 m)    Wt 215 lb (97.5 kg)    SpO2 98%    BMI 29.99 kg/m2     GEN: No apparent distress. Alert and oriented and responds to all questions appropriately. EYES:  Conjunctiva clear;   EAR: External ears are normal.  Tympanic membranes are clear and without effusion. LUNGS: Respirations unlabored;  EXT: Moving all ext equally  SKIN: No obvious rashes. Assessment:    ICD-10-CM ICD-9-CM    1. Ear discomfort, bilateral H92.03 388.70    Possibly related to seasonal allergies. Sx improved today. Ear exam WNL. Plan:  Continue to monitor sx.   Can try OTC swimmers ear PRN    RTC: PRN

## 2018-08-27 ENCOUNTER — OFFICE VISIT (OUTPATIENT)
Dept: FAMILY MEDICINE CLINIC | Age: 53
End: 2018-08-27

## 2018-08-27 VITALS
DIASTOLIC BLOOD PRESSURE: 76 MMHG | HEART RATE: 58 BPM | RESPIRATION RATE: 16 BRPM | BODY MASS INDEX: 29.82 KG/M2 | SYSTOLIC BLOOD PRESSURE: 119 MMHG | TEMPERATURE: 98.4 F | WEIGHT: 213 LBS | OXYGEN SATURATION: 98 % | HEIGHT: 71 IN

## 2018-08-27 DIAGNOSIS — G44.209 TENSION-TYPE HEADACHE, NOT INTRACTABLE, UNSPECIFIED CHRONICITY PATTERN: Primary | ICD-10-CM

## 2018-08-27 NOTE — PATIENT INSTRUCTIONS
You may take 600mg of ibuprofen every 6 hours or 800mg every 8 hours for headache. It is important to get a the appropriate amount of rest to ensure resolution of your headache. Please let us know if your symptoms persist or worsen. Continue to monitor for signs of one sided weakness, vision or speech changes, or decreased sensation in one side versus the other as this may warrant images. Learning About FAST: Stroke Warning Signs  What is FAST? FAST is a simple way to remember the main symptoms of stroke. Recognizing these symptoms helps you know when to call for medical help. FAST stands for:  · Face drooping. · Arm weakness. · Speech difficulty. · Time to call 911. What happens when you have a stroke? A stroke occurs when a blood vessel to the brain bursts or is blocked by a blood clot. Within minutes, the nerve cells in that part of the brain die. As a result, the part of the body controlled by those cells cannot work properly. The effects of a stroke may range from mild to severe. They may get better, or they may last the rest of your life. A stroke can affect vision, speech, behavior, thought processes, and your ability to move. It can cause symptoms that may include:  · Sudden numbness, tingling, weakness, or loss of movement in your face, arm, or leg, especially on only one side of your body. · Sudden vision changes. · Sudden trouble speaking. · Sudden confusion or trouble understanding simple statements. · Sudden problems with walking or balance. · A sudden, severe headache that is different from past headaches. Why is it important to get help FAST? Quick treatment may save your life. And it may reduce the damage in your brain so that you have fewer problems after the stroke. When you know the FAST symptoms, you will know when it's important to call for medical help. Where can you learn more? Go to http://arben-bayron.info/.   Enter V350 in the search box to learn more about \"Learning About FAST: Stroke Warning Signs. \"  Current as of: November 21, 2017  Content Version: 11.7  © 8803-9607 [employer]. Care instructions adapted under license by [employer] (which disclaims liability or warranty for this information). If you have questions about a medical condition or this instruction, always ask your healthcare professional. [address] any warranty or liability for your use of this information. Tension Headache: Care Instructions  Your Care Instructions  Most headaches are tension headaches. These headaches tend to happen again, especially if you are under stress. A tension headache may cause pain or a feeling of pressure all over your head. You probably can't pinpoint the center of the pain. If you keep getting tension headaches, the best thing you can do to limit them is to find out what is causing them and then make changes in those areas. Follow-up care is a key part of your treatment and safety. Be sure to make and go to all appointments, and call your doctor if you are having problems. It's also a good idea to know your test results and keep a list of the medicines you take. How can you care for yourself at home? · Rest in a quiet, dark room with a cool cloth on your forehead until your headache is gone. Close your eyes, and try to relax or go to sleep. Don't watch TV or read. Avoid using the computer. · Use a warm, moist towel or a heating pad set on low to relax tight shoulder and neck muscles. · Have someone gently massage your neck and shoulders. · Take pain medicines exactly as directed. ¨ If the doctor gave you a prescription medicine for pain, take it as prescribed. ¨ If you are not taking a prescription pain medicine, ask your doctor if you can take an over-the-counter medicine.   · Be careful not to take pain medicine more often than the instructions allow, because you may get worse or more frequent headaches when the medicine wears off. · If you get another tension headache, stop what you are doing and sit quietly for a moment. Close your eyes and breathe slowly. Try to relax your head and neck muscles. · Do not ignore new symptoms that occur with a headache, such as fever, weakness or numbness, vision changes, or confusion. These may be signs of a more serious problem. To help prevent headaches  · Keep a headache diary so you can figure out what triggers your headaches. Avoiding triggers may help you prevent headaches. Record when each headache began, how long it lasted, and what the pain was like (throbbing, aching, stabbing, or dull). List anything that may have triggered the headache, such as being physically or emotionally stressed or being anxious or depressed. Other possible triggers are hunger, anger, fatigue, poor posture, and muscle strain. · Find healthy ways to deal with stress. Headaches are most common during or right after stressful times. Take time to relax before and after you do something that has caused a headache in the past.  · Exercise daily to relieve stress. Relaxation exercises may help reduce tension. · Get plenty of sleep. · Eat regularly and well. Long periods without food can trigger a headache. · Treat yourself to a massage. Some people find that massages are very helpful in relieving tension. · Try to keep your muscles relaxed by keeping good posture. Check your jaw, face, neck, and shoulder muscles for tension, and try to relax them. When sitting at a desk, change positions often, and stretch for 30 seconds each hour. · Reduce eyestrain from computers by blinking frequently and looking away from the computer screen every so often. Make sure you have proper eyewear and that your monitor is set up properly, about an arm's length away. When should you call for help? Call 911 anytime you think you may need emergency care.  For example, call if:    · You have signs of a stroke. These may include:  ¨ Sudden numbness, paralysis, or weakness in your face, arm, or leg, especially on only one side of your body. ¨ Sudden vision changes. ¨ Sudden trouble speaking. ¨ Sudden confusion or trouble understanding simple statements. ¨ Sudden problems with walking or balance. ¨ A sudden, severe headache that is different from past headaches.    Call your doctor now or seek immediate medical care if:    · You have new or worse nausea and vomiting.     · You have a new or higher fever.     · Your headache gets much worse.    Watch closely for changes in your health, and be sure to contact your doctor if:    · You are not getting better after 2 days (48 hours). Where can you learn more? Go to http://arben-bayron.info/. Enter 55 17 13 in the search box to learn more about \"Tension Headache: Care Instructions. \"  Current as of: October 9, 2017  Content Version: 11.7  © 7044-6110 [employer], [employer] MD. Care instructions adapted under license by [employer] (which disclaims liability or warranty for this information). If you have questions about a medical condition or this instruction, always ask your healthcare professional. [place] [address] any warranty or liability for your use of this information.

## 2018-08-27 NOTE — PROGRESS NOTES
Chief Complaint   Patient presents with    Headache     1. Have you been to the ER, urgent care clinic since your last visit? Hospitalized since your last visit? No    2. Have you seen or consulted any other health care providers outside of the [address] [place] [address] since your last visit? Include any pap smears or colon screening.  No

## 2018-08-27 NOTE — PROGRESS NOTES
Subjective  CC: Maryam Lowry is an 46 y.o. male presents for right sided head discomfort    Discomfort started about a week ago. He has some right sided head pressure but denies any recent cough, congestion, or recent illness. He reports that sometimes he will experience watery eyes on occasions but denies rhinorrhea or cyclic nature of symptoms. He has never had this before. No h/o migraines or sensitivity to light or sound, nausea or vomiting. Does not know what makes it better or what makes it worse. Denies vision changes or sleep disturbances. Has not tried any medications because the pain has not been unbearable. Denies feeling like a \"headache\" per se but feels like the right back side of his head is tender to touch. At times the \"soreness\" can wrap around the the front of his head. 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 He will work all night and then drive home. He reports some days he only gets about 2 hours of sleep so he has been feeling fatigued lately. He wears a CPAP at night and denies one sided leg weakness, decreased sensation in his extremities or changes in speech. Allergies - reviewed:   No Known Allergies      Medications - reviewed:   Current Outpatient Prescriptions   Medication Sig    PARoxetine (PAXIL) 20 mg tablet TAKE 1 TABLET ORALLY ONCE DAILY (Patient taking differently: take half a pill daily)    aspirin delayed-release 325 mg tablet Take 1 Tab by mouth two (2) times a day. (Patient taking differently: Take 81 mg by mouth daily.)    esomeprazole (NEXIUM) 20 mg capsule Take 40 mg by mouth every morning. No current facility-administered medications for this visit.           Past Medical History - reviewed:  Past Medical History:   Diagnosis Date    Anxiety     Arrhythmia     palpitations- saw cardiology 01/13/2017    Depression     -patient denies this 12/13/2017. states med is for Anxiety    Dyslipidemia     GERD (gastroesophageal reflux disease)     barrets esophageus     Hemorrhoids, internal, with bleeding     Personal history of Ylhm-Cytbt-Xctbjwo disease     Sleep apnea          Immunizations - reviewed:   Immunization History   Administered Date(s) Administered    Tdap 11/28/2014         ROS  Review of Systems : A complete review of systems was performed and is negative except for those mentioned in the HPI. Physical Exam  Visit Vitals    /76 (BP 1 Location: Left arm, BP Patient Position: Sitting)    Pulse (!) 58    Temp 98.4 °F (36.9 °C) (Oral)    Resp 16    Ht 5' 11\" (1.803 m)    Wt 213 lb (96.6 kg)    SpO2 98%    BMI 29.71 kg/m2       General appearance - Alert, NAD. Head: Atraumatic. Normocephalic. No lymphadenopathy. Tenderness to palpation over right side occiput and base of skull. Eyes: EOMI. Sclera white. PERRL  Ears: Hearing grossly normal. TM non erythematous with no effusion, mild amount of cerumen bilaterally  Throat: pharynx clear, no exudates. Respiratory - LCTAB. No wheeze/rale/rhonchi  Heart - Normal rate, regular rhythm. No m/r/r  Neurological - No focal deficits. Speech normal. CN II-XII intact, strength 5/5 in UE and LE bilaterally. Sensation and coordination intact. No word finding present. Extremities - No LE edema. Distal pulses intact  Skin - normal coloration and normal turgor. No cyanosis, no rash. Assessment/Plan  1. Tension-type headache, not intractable, unspecified chronicity pattern - Patient with no neurologic symptoms present on exam today. History is more of support of tension HA as neuro exam is unremarkable, patient reports minimal sleep, tenderness to palpation over posterior neck, and HA characteristic described by pt is supportive of this. - Ibuprofen every 6 hours PRN  - Lifestyle modifications (appropriate amount of rest etc).   - ER precautions given, pt understand that he should come in for evaluation if neurologic symptoms develop or he notices a change in the character of his HA. Follow-up Disposition:  Return if symptoms worsen or fail to improve. I have discussed the aforementioned diagnoses and plan with the patient in detail. I have provided information in person and/or in AVS. All questions answered prior to discharge.     Lala Lima MD  Family Medicine Resident  PGY 2

## 2018-08-27 NOTE — MR AVS SNAPSHOT
[address] [phone] Patient: Luis Ruiz MRN: [id number] :[id number] Visit Information Date & Time Provider Department Dept. Phone Encounter #  
 2018 11:15 AM Gael Godinez MD [address] [facility] [phone] [id number] Upcoming Health Maintenance Date Due FOBT Q 1 YEAR AGE 50-75 2017 Influenza Age 5 to Adult 2018 DTaP/Tdap/Td series (2 - Td) 2024 Allergies as of 2018  Review Complete On: 2018 By: Gudelia Shukla LPN No Known Allergies Current Immunizations  Reviewed on 2014 Name Date Tdap 2014 Not reviewed this visit Vitals BP Pulse Temp Resp Height(growth percentile) Weight(growth percentile) 119/76 (BP 1 Location: Left arm, BP Patient Position: Sitting) (!) 58 98.4 °F (36.9 °C) (Oral) 16 5' 11\" (1.803 m) 213 lb (96.6 kg) SpO2 BMI Smoking Status 98% 29.71 kg/m2 Former Smoker Vitals History BMI and BSA Data Body Mass Index Body Surface Area  
 29.71 kg/m 2 2.2 m 2 Preferred Pharmacy Pharmacy Name Phone [id number] [place], [address] AT [facility] [phone] Your Updated Medication List  
  
   
This list is accurate as of 18 11:59 AM.  Always use your most recent med list.  
  
  
  
  
 acetaminophen 500 mg tablet Commonly known as:  Jr Nany Harding  Take 1-2 Tabs by mouth every six (6) hours as needed for Pain. Not to exceed 4,000mg in any 24 hour period  Indications: Pain  
  
 aspirin delayed-release 325 mg tablet Take 1 Tab by mouth two (2) times a day. eszopiclone 1 mg tablet Commonly known as:  Young Barboza Take 1 Tab by mouth nightly. Max Daily Amount: 1 mg. Indications: insomnia HYDROcodone-acetaminophen 5-325 mg per tablet Commonly known as:  Karina Vazquez  
 Take 1-2 Tabs by mouth every four (4) hours as needed. Max Daily Amount: 12 Tabs. NexIUM 20 mg capsule Generic drug:  esomeprazole Take 40 mg by mouth every morning. ondansetron 8 mg disintegrating tablet Commonly known as:  ZOFRAN ODT Take 0.5 Tabs by mouth every eight (8) hours as needed for Nausea. PARoxetine 20 mg tablet Commonly known as:  PAXIL TAKE 1 TABLET ORALLY ONCE DAILY polyethylene glycol 17 gram packet Commonly known as:  Brenna Boer Take 17 g by mouth daily. Indications: constipation  
  
 traMADol 50 mg tablet Commonly known as:  ULTRAM  
Take 1 Tab by mouth every six (6) hours as needed for Pain (Take for breakthrough pain if Oxycodone is not working). Max Daily Amount: 200 mg. Indications: Pain, Post-op Pain, Diagnosis Hip and Knee Arthritis ICD 10 - M16.9 Patient Instructions You may take 600mg of ibuprofen every 6 hours or 800mg every 8 hours for headache. It is important to get a the appropriate amount of rest to ensure resolution of your headache. Please let us know if your symptoms persist or worsen. Continue to monitor for signs of one sided weakness, vision or speech changes, or decreased sensation in one side versus the other as this may warrant images. Learning About FAST: Stroke Warning Signs What is FAST? FAST is a simple way to remember the main symptoms of stroke. Recognizing these symptoms helps you know when to call for medical help. FAST stands for: 
· Face drooping. · Arm weakness. · Speech difficulty. · Time to call 911. What happens when you have a stroke? A stroke occurs when a blood vessel to the brain bursts or is blocked by a blood clot. Within minutes, the nerve cells in that part of the brain die. As a result, the part of the body controlled by those cells cannot work properly. The effects of a stroke may range from mild to severe.  They may get better, or they may last the rest of your life. A stroke can affect vision, speech, behavior, thought processes, and your ability to move. It can cause symptoms that may include: 
· Sudden numbness, tingling, weakness, or loss of movement in your face, arm, or leg, especially on only one side of your body. · Sudden vision changes. · Sudden trouble speaking. · Sudden confusion or trouble understanding simple statements. · Sudden problems with walking or balance. · A sudden, severe headache that is different from past headaches. Why is it important to get help FAST? Quick treatment may save your life. And it may reduce the damage in your brain so that you have fewer problems after the stroke. When you know the FAST symptoms, you will know when it's important to call for medical help. Where can you learn more? Go to http://arben-bayron.info/. Enter U711 in the search box to learn more about \"Learning About FAST: Stroke Warning Signs. \" Current as of: November 21, 2017 Content Version: 11.7 © 6293-5243 [employer]. Care instructions adapted under license by [employer] (which disclaims liability or warranty for this information). If you have questions about a medical condition or this instruction, always ask your healthcare professional. [address] any warranty or liability for your use of this information. Tension Headache: Care Instructions Your Care Instructions Most headaches are tension headaches. These headaches tend to happen again, especially if you are under stress. A tension headache may cause pain or a feeling of pressure all over your head. You probably can't pinpoint the center of the pain. If you keep getting tension headaches, the best thing you can do to limit them is to find out what is causing them and then make changes in those areas. Follow-up care is a key part of your treatment and safety.  Be sure to make and go to all appointments, and call your doctor if you are having problems. It's also a good idea to know your test results and keep a list of the medicines you take. How can you care for yourself at home? · Rest in a quiet, dark room with a cool cloth on your forehead until your headache is gone. Close your eyes, and try to relax or go to sleep. Don't watch TV or read. Avoid using the computer. · Use a warm, moist towel or a heating pad set on low to relax tight shoulder and neck muscles. · Have someone gently massage your neck and shoulders. · Take pain medicines exactly as directed. ¨ If the doctor gave you a prescription medicine for pain, take it as prescribed. ¨ If you are not taking a prescription pain medicine, ask your doctor if you can take an over-the-counter medicine. · Be careful not to take pain medicine more often than the instructions allow, because you may get worse or more frequent headaches when the medicine wears off. · If you get another tension headache, stop what you are doing and sit quietly for a moment. Close your eyes and breathe slowly. Try to relax your head and neck muscles. · Do not ignore new symptoms that occur with a headache, such as fever, weakness or numbness, vision changes, or confusion. These may be signs of a more serious problem. To help prevent headaches · Keep a headache diary so you can figure out what triggers your headaches. Avoiding triggers may help you prevent headaches. Record when each headache began, how long it lasted, and what the pain was like (throbbing, aching, stabbing, or dull). List anything that may have triggered the headache, such as being physically or emotionally stressed or being anxious or depressed. Other possible triggers are hunger, anger, fatigue, poor posture, and muscle strain. · Find healthy ways to deal with stress. Headaches are most common during or right after stressful times.  Take time to relax before and after you do something that has caused a headache in the past. 
· Exercise daily to relieve stress. Relaxation exercises may help reduce tension. · Get plenty of sleep. · Eat regularly and well. Long periods without food can trigger a headache. · Treat yourself to a massage. Some people find that massages are very helpful in relieving tension. · Try to keep your muscles relaxed by keeping good posture. Check your jaw, face, neck, and shoulder muscles for tension, and try to relax them. When sitting at a desk, change positions often, and stretch for 30 seconds each hour. · Reduce eyestrain from computers by blinking frequently and looking away from the computer screen every so often. Make sure you have proper eyewear and that your monitor is set up properly, about an arm's length away. When should you call for help? Call 911 anytime you think you may need emergency care. For example, call if: 
  · You have signs of a stroke. These may include: 
¨ Sudden numbness, paralysis, or weakness in your face, arm, or leg, especially on only one side of your body. ¨ Sudden vision changes. ¨ Sudden trouble speaking. ¨ Sudden confusion or trouble understanding simple statements. ¨ Sudden problems with walking or balance. ¨ A sudden, severe headache that is different from past headaches.  
 Call your doctor now or seek immediate medical care if: 
  · You have new or worse nausea and vomiting.  
  · You have a new or higher fever.  
  · Your headache gets much worse.  
 Watch closely for changes in your health, and be sure to contact your doctor if: 
  · You are not getting better after 2 days (48 hours). Where can you learn more? Go to http://arben-bayron.info/. Enter 84 17 17 in the search box to learn more about \"Tension Headache: Care Instructions. \" Current as of: October 9, 2017 Content Version: 11.7 © 7806-5625 [employer].  Care instructions adapted under license by [address] (which disclaims liability or warranty for this information). If you have questions about a medical condition or this instruction, always ask your healthcare professional. [address] any warranty or liability for your use of this information. Introducing [facility] & HEALTH SERVICES! Dear Laverne Reeder: 
Thank you for requesting a [employer] account. Our records indicate that you have previously registered for a [employer] account but its currently inactive. Please call our [employer] support line at [phone]. Additional Information If you have questions, please visit the Frequently Asked Questions section of the [employer] website at [email or web address] Remember, [employer] is NOT to be used for urgent needs. For medical emergencies, dial 911. Now available from your iPhone and Android! Please provide this summary of care documentation to your next provider. Your primary care clinician is listed as Abdullahi Rivas. If you have any questions after today's visit, please call [phone].

## 2019-04-11 ENCOUNTER — OFFICE VISIT (OUTPATIENT)
Dept: FAMILY MEDICINE CLINIC | Age: 54
End: 2019-04-11

## 2019-04-11 VITALS
RESPIRATION RATE: 18 BRPM | WEIGHT: 217 LBS | HEIGHT: 71 IN | BODY MASS INDEX: 30.38 KG/M2 | HEART RATE: 63 BPM | OXYGEN SATURATION: 99 % | SYSTOLIC BLOOD PRESSURE: 129 MMHG | DIASTOLIC BLOOD PRESSURE: 86 MMHG | TEMPERATURE: 98.4 F

## 2019-04-11 DIAGNOSIS — G47.30 ORGANIC SLEEP APNEA: Primary | ICD-10-CM

## 2019-04-11 DIAGNOSIS — F41.9 ANXIETY: ICD-10-CM

## 2019-04-11 RX ORDER — PAROXETINE HYDROCHLORIDE 20 MG/1
TABLET, FILM COATED ORAL
Qty: 90 TAB | Refills: 0 | Status: SHIPPED | OUTPATIENT
Start: 2019-04-11 | End: 2019-08-10 | Stop reason: SDUPTHER

## 2019-04-11 NOTE — PROGRESS NOTES
Chief Complaint Patient presents with  Medication Evaluation Paxil  Medication Refill Paxil 1. Have you been to the ER, urgent care clinic since your last visit? Hospitalized since your last visit? No 
 
2. Have you seen or consulted any other health care providers outside of the [facility] since your last visit? Include any pap smears or colon screening.  No

## 2019-04-11 NOTE — PROGRESS NOTES
Jess Moscoso is a 48 y.o. male who presents for refill of paxil. Anxiety and Depression 
- difficulty sleeping 
- sometimes difficulty falling asleep but wakes up at night 
- mood is good 
- anxiety is not bothering him - notices when forgets pill because will have panicky feeling - Taking 1/2 paxil tab (10mg) 
- feeling fatigued and worn down 
- feels mind is racing before going to sleep Difficulty Sleeping 
- has mild MARTIN with mask 
- hasn't been wearing for about a month because caused sinus-type headache 
- if needing to get up to bathroom trouble falling back asleep KAITLIN -7: 0 PHQ-9: 5 PMHx: 
Past Medical History:  
Diagnosis Date  Anxiety  Arrhythmia   
 palpitations- saw cardiology 2017  Depression   
 -patient denies this 2017. states med is for Anxiety  Dyslipidemia  GERD (gastroesophageal reflux disease)   
 barrets esophageus  Hemorrhoids, internal, with bleeding  Personal history of Sqvd-Banqb-Mdtuqnc disease  Sleep apnea Meds:  
Current Outpatient Medications Medication Sig Dispense Refill  PARoxetine (PAXIL) 20 mg tablet TAKE 1 TABLET ORALLY ONCE DAILY 30 Tab 0  
 aspirin delayed-release 325 mg tablet Take 1 Tab by mouth two (2) times a day. (Patient taking differently: Take 81 mg by mouth daily.) 60 Tab 0  
 esomeprazole (NEXIUM) 20 mg capsule Take 40 mg by mouth every morning. Allergies:  
No Known Allergies Smoker: 
Social History Tobacco Use Smoking Status Former Smoker  Packs/day: 0.25  
 Years: 8.00  
 Pack years: 2.00  Last attempt to quit: 1981  Years since quittin.2 Smokeless Tobacco Former User ETOH:  
Social History Substance and Sexual Activity Alcohol Use Yes  Alcohol/week: 3.0 oz  Types: 5 Glasses of wine per week FH:  
Family History Problem Relation Age of Onset  Heart Disease Mother  Heart Attack Mother   
     induced by gastric bleeding  COPD Father  Alcohol abuse Brother  Stroke Brother  Arthritis-osteo Sister Back surgery  Alcohol abuse Brother ROS: 
General/Constitutional:   +fatigue, No headache, fever, weight loss or weight gain Neck:   No swelling, masses Cardiac:    No chest pain, palpitations Respiratory:   No cough or shortness of breath GI:   No nausea/vomiting, diarrhea, abdominal pain, bloody or dark stools Psych: Denies anxiety, depression, SI Physical Exam: 
Visit Vitals /86 (BP 1 Location: Left arm, BP Patient Position: Sitting) Pulse 63 Temp 98.4 °F (36.9 °C) (Oral) Resp 18 Ht 5' 11\" (1.803 m) Wt 217 lb (98.4 kg) SpO2 99% BMI 30.27 kg/m² GEN: No apparent distress. Alert and oriented and responds to all questions appropriately. well groomed NECK:  Supple; no masses; thyroid normal          
LUNGS: Respirations unlabored; clear to auscultation bilaterally CARDIOVASCULAR: Regular, rate, and rhythm without murmurs, gallops or rubs NEUROLOGIC:  No focal neurologic deficits. Strength and sensation grossly intact. Coordination and gait grossly intact. EXT: Well perfused. No edema. SKIN: No obvious rashes. PSYCH: linear though process, mood is \"good\", does not appear anxious Assessment: ICD-10-CM ICD-9-CM 1. Anxiety F41.9 300.00 PARoxetine (PAXIL) 20 mg tablet Plan: - Discussed sleep hygiene: turning screens off at least one hour before bed, doing something relaxing prior to sleep, keeping pad at bedside to write thoughts prior to sleep and when waking from sleep. Referred to sleep medicine. - Anxiety: Well controlled. Continue paxil 10mg daily (1/2 tab of the 20mg tablet). Discussed counseling but he is not interested. Labs today. He will RTC in 1-2 weeks to review labs and CPE.

## 2019-04-12 LAB
ALBUMIN SERPL-MCNC: 4.7 G/DL (ref 3.5–5.5)
ALBUMIN/GLOB SERPL: 2 {RATIO} (ref 1.2–2.2)
ALP SERPL-CCNC: 77 IU/L (ref 39–117)
ALT SERPL-CCNC: 23 IU/L (ref 0–44)
AST SERPL-CCNC: 21 IU/L (ref 0–40)
BASOPHILS # BLD AUTO: 0 X10E3/UL (ref 0–0.2)
BASOPHILS NFR BLD AUTO: 1 %
BILIRUB SERPL-MCNC: 0.6 MG/DL (ref 0–1.2)
BUN SERPL-MCNC: 15 MG/DL (ref 6–24)
BUN/CREAT SERPL: 14 (ref 9–20)
CALCIUM SERPL-MCNC: 9.6 MG/DL (ref 8.7–10.2)
CHLORIDE SERPL-SCNC: 100 MMOL/L (ref 96–106)
CHOLEST SERPL-MCNC: 249 MG/DL (ref 100–199)
CO2 SERPL-SCNC: 22 MMOL/L (ref 20–29)
CREAT SERPL-MCNC: 1.09 MG/DL (ref 0.76–1.27)
EOSINOPHIL # BLD AUTO: 0.2 X10E3/UL (ref 0–0.4)
EOSINOPHIL NFR BLD AUTO: 3 %
ERYTHROCYTE [DISTWIDTH] IN BLOOD BY AUTOMATED COUNT: 13.6 % (ref 12.3–15.4)
GLOBULIN SER CALC-MCNC: 2.3 G/DL (ref 1.5–4.5)
GLUCOSE SERPL-MCNC: 106 MG/DL (ref 65–99)
HCT VFR BLD AUTO: 45.2 % (ref 37.5–51)
HDLC SERPL-MCNC: 51 MG/DL
HGB BLD-MCNC: 15.5 G/DL (ref 13–17.7)
IMM GRANULOCYTES # BLD AUTO: 0 X10E3/UL (ref 0–0.1)
IMM GRANULOCYTES NFR BLD AUTO: 0 %
INTERPRETATION, 910389: NORMAL
LDLC SERPL CALC-MCNC: 181 MG/DL (ref 0–99)
LYMPHOCYTES # BLD AUTO: 1.9 X10E3/UL (ref 0.7–3.1)
LYMPHOCYTES NFR BLD AUTO: 42 %
MCH RBC QN AUTO: 32.6 PG (ref 26.6–33)
MCHC RBC AUTO-ENTMCNC: 34.3 G/DL (ref 31.5–35.7)
MCV RBC AUTO: 95 FL (ref 79–97)
MONOCYTES # BLD AUTO: 0.4 X10E3/UL (ref 0.1–0.9)
MONOCYTES NFR BLD AUTO: 10 %
NEUTROPHILS # BLD AUTO: 2 X10E3/UL (ref 1.4–7)
NEUTROPHILS NFR BLD AUTO: 44 %
PLATELET # BLD AUTO: 206 X10E3/UL (ref 150–379)
POTASSIUM SERPL-SCNC: 4.8 MMOL/L (ref 3.5–5.2)
PROT SERPL-MCNC: 7 G/DL (ref 6–8.5)
RBC # BLD AUTO: 4.76 X10E6/UL (ref 4.14–5.8)
SODIUM SERPL-SCNC: 140 MMOL/L (ref 134–144)
TRIGL SERPL-MCNC: 87 MG/DL (ref 0–149)
TSH SERPL DL<=0.005 MIU/L-ACNC: 2.88 UIU/ML (ref 0.45–4.5)
VLDLC SERPL CALC-MCNC: 17 MG/DL (ref 5–40)
WBC # BLD AUTO: 4.5 X10E3/UL (ref 3.4–10.8)

## 2019-04-13 ENCOUNTER — OFFICE VISIT (OUTPATIENT)
Dept: FAMILY MEDICINE CLINIC | Age: 54
End: 2019-04-13

## 2019-04-13 VITALS
HEIGHT: 71 IN | WEIGHT: 215 LBS | RESPIRATION RATE: 20 BRPM | BODY MASS INDEX: 30.1 KG/M2 | DIASTOLIC BLOOD PRESSURE: 77 MMHG | SYSTOLIC BLOOD PRESSURE: 115 MMHG | HEART RATE: 55 BPM | TEMPERATURE: 98 F

## 2019-04-13 DIAGNOSIS — M43.6 TORTICOLLIS: Primary | ICD-10-CM

## 2019-04-13 RX ORDER — CYCLOBENZAPRINE HCL 10 MG
10 TABLET ORAL
Qty: 30 TAB | Refills: 0 | Status: SHIPPED | OUTPATIENT
Start: 2019-04-13 | End: 2019-04-19

## 2019-04-13 NOTE — PROGRESS NOTES
Received call from pt, Flexeril prescription was not at pharmacy when pt's wife went to pick it up at pharmacy. Called and spoke with Dr. Ainsley Perera who saw pt in clinic earlier today, confirmed pt was supposed to get Flexeril 10mg TID for 10 days. Prescription sent to patient pharmacy and patient notified.     Slick Mora MD  4:39 PM

## 2019-04-13 NOTE — PROGRESS NOTES
HISTORY OF PRESENT ILLNESS  Stephanie Proctor is a 48 y.o. male. HPI   This 48year old gentleman presents with complaints of \"neck stiffness\"  He is not sure if he \"slept wrong\" but states it hurts to turn his neck around especially to the left. He denies trauma. Pain per pt is non raditaiong, no problems with sensation or other motor functions    Review of Systems   Constitutional: Negative for chills and fever. Musculoskeletal: Positive for neck pain. Negative for back pain and joint pain. Neurological: Negative for dizziness, sensory change, speech change, focal weakness and weakness. Physical Exam   Constitutional: He is oriented to person, place, and time. He appears well-developed and well-nourished. Musculoskeletal: Normal range of motion. He exhibits no tenderness. No paraspinal muscle ttp of the cervical spine. No ttp of the bony prominences   Neurological: He is alert and oriented to person, place, and time. He has normal reflexes. He displays normal reflexes. No cranial nerve deficit. He exhibits normal muscle tone. Coordination normal.       ASSESSMENT and PLAN    ICD-10-CM ICD-9-CM    1.  Torticollis M43.6 723.5    will start on flexeril  Pt states heat pads have been helpful  continue heat pads and follow up if not better  Precautions given

## 2019-04-13 NOTE — PROGRESS NOTES
Chief Complaint   Patient presents with    Neck Pain     Posterior neck pain x 4 days. Patient states his pain is a 9 and is so severe it is giving him a headace and results in nausea. 1. Have you been to the ER, urgent care clinic since your last visit? Hospitalized since your last visit? No    2. Have you seen or consulted any other health care providers outside of the [address] [place] [address] since your last visit? Include any pap smears or colon screening.  No      Visit Vitals  /77 (BP 1 Location: Left arm, BP Patient Position: Sitting)   Pulse (!) 55   Temp 98 °F (36.7 °C) (Oral)   Resp 20   Ht 5' 11\" (1.803 m)   Wt 215 lb (97.5 kg)   BMI 29.99 kg/m²

## 2019-04-19 ENCOUNTER — OFFICE VISIT (OUTPATIENT)
Dept: FAMILY MEDICINE CLINIC | Age: 54
End: 2019-04-19

## 2019-04-19 VITALS
TEMPERATURE: 98 F | DIASTOLIC BLOOD PRESSURE: 89 MMHG | OXYGEN SATURATION: 94 % | HEART RATE: 66 BPM | RESPIRATION RATE: 16 BRPM | WEIGHT: 215 LBS | BODY MASS INDEX: 30.1 KG/M2 | SYSTOLIC BLOOD PRESSURE: 130 MMHG | HEIGHT: 71 IN

## 2019-04-19 DIAGNOSIS — M54.2 NECK PAIN: Primary | ICD-10-CM

## 2019-04-19 RX ORDER — METAXALONE 400 MG/1
400 TABLET ORAL
Qty: 15 TAB | Refills: 0 | Status: SHIPPED | OUTPATIENT
Start: 2019-04-19 | End: 2019-05-16 | Stop reason: ALTCHOICE

## 2019-04-19 NOTE — PROGRESS NOTES
1. Have you been to the ER, urgent care clinic since your last visit? Hospitalized since your last visit? No    2. Have you seen or consulted any other health care providers outside of the [address] [place] [address] since your last visit? Include any pap smears or colon screening. No    Chief Complaint   Patient presents with    Neck Pain     times 2 weeks     Patient states in last Saturday, given muscle relaxer, patient states they do not work. Blood pressure 130/89, pulse 66, temperature 98 °F (36.7 °C), temperature source Oral, resp. rate 16, height 5' 11\" (1.803 m), weight 215 lb (97.5 kg), SpO2 94 %.

## 2019-04-19 NOTE — PROGRESS NOTES
Subjective    Chief complaint: neck pain     Violet Charles is an 48 y.o. male presenting for left sided neck pain that is limiting his ROM. Seen 6 days ago-given flexeril which he stated has not helped. Aggravating factors: turning his head to the right side is the worst and flexion of neck. Alleviating factors: most relief with heat and minimal relief with BC powder     Denies fever, chills, weight loss, headache, dizziness, focal neurological deficits, blurry vision. Allergies - reviewed:   No Known Allergies      Medications - reviewed:   Current Outpatient Medications   Medication Sig    cyclobenzaprine (FLEXERIL) 10 mg tablet Take 1 Tab by mouth three (3) times daily as needed for Muscle Spasm(s) for up to 10 days.  PARoxetine (PAXIL) 20 mg tablet TAKE 1 TABLET ORALLY ONCE DAILY    aspirin delayed-release 325 mg tablet Take 1 Tab by mouth two (2) times a day. (Patient taking differently: Take 81 mg by mouth daily.)    esomeprazole (NEXIUM) 20 mg capsule Take 40 mg by mouth every morning. No current facility-administered medications for this visit. Past Medical History - reviewed:  Past Medical History:   Diagnosis Date    Anxiety     Arrhythmia     palpitations- saw cardiology 01/13/2017    Depression     -patient denies this 12/13/2017. states med is for Anxiety    Dyslipidemia     GERD (gastroesophageal reflux disease)     barrets esophageus     Hemorrhoids, internal, with bleeding     Personal history of Lpqj-Udxta-Vtzvost disease     Sleep apnea          Immunizations - reviewed:   Immunization History   Administered Date(s) Administered    Tdap 11/28/2014       ROS  Review of Systems: See HPI.     Physical Exam  Visit Vitals  /89 (BP 1 Location: Left arm, BP Patient Position: Sitting)   Pulse 66   Temp 98 °F (36.7 °C) (Oral)   Resp 16   Ht 5' 11\" (1.803 m)   Wt 215 lb (97.5 kg)   SpO2 94%   BMI 29.99 kg/m²       General appearance - Alert, Looking straight ahead trying not to move neck. Head: Atraumatic. Normocephalic. MSK: Neck supple. No swelling, point tenderness on palpation of posterior neck. Difficulty and pain elicited with neck flexion, abduction, adduction. +spurling test.   Respiratory - LCTAB. No wheeze/rale/rhonchi  Heart - Normal rate, regular rhythm. No m/r/r  Neurological - CN II-XII grossly intact. No focal deficits. Speech normal.     Cervical spine x-ray: AP and lateral views of the cervical spine demonstrate unchanged, grade 1 retrolisthesis 3 mm at C3-4. No significant disc space narrowing. Ventral osteophytes C3-C7 progressed slightly in the interval. No prevertebral soft tissue swelling. C1-2 relationship is maintained. Degenerative changes cervical spine as above may progress slightly in the interval. No acute abnormality. Assessment/Plan  1. Neck pain: Symptoms likely 2/2 progressive degenerative changes on x-ray vs. Radicular neck pain. Will treat with tylenol scheduled, naproxen PRN and if severe pain he is to use skelaxin (flexeril did not work). If no improvement, will need further imaging and ortho referrall. Strict precautions provided to RTC.   - XR SPINE CERV PA LAT ODONT 3 V MAX; Future  - metaxalone (SKELAXIN) 400 mg tablet; Take 1 Tab by mouth three (3) times daily as needed (neck pain). Dispense: 15 Tab; Refill: 0    I discussed the aforementioned diagnoses with the patient as well as the plan of care.      Kailash Kincaid MD  Family Medicine Resident  PGY 3

## 2019-04-19 NOTE — PATIENT INSTRUCTIONS
1.  Naproxin (Aleve): 220mg 1-2 tablets twice a day as needed . Do not take for more than 2 weeks. 2. Acetaminophen (Tylenol):  500mg 1-2 tablets every 6 hours as needed for pain. Neck Pain: Care Instructions  Your Care Instructions    You can have neck pain anywhere from the bottom of your head to the top of your shoulders. It can spread to the upper back or arms. Injuries, painting a ceiling, sleeping with your neck twisted, staying in one position for too long, and many other activities can cause neck pain. Most neck pain gets better with home care. Your doctor may recommend medicine to relieve pain or relax your muscles. He or she may suggest exercise and physical therapy to increase flexibility and relieve stress. You may need to wear a special (cervical) collar to support your neck for a day or two. Follow-up care is a key part of your treatment and safety. Be sure to make and go to all appointments, and call your doctor if you are having problems. It's also a good idea to know your test results and keep a list of the medicines you take. How can you care for yourself at home? · Try using a heating pad on a low or medium setting for 15 to 20 minutes every 2 or 3 hours. Try a warm shower in place of one session with the heating pad. · You can also try an ice pack for 10 to 15 minutes every 2 to 3 hours. Put a thin cloth between the ice and your skin. · Take pain medicines exactly as directed. ? If the doctor gave you a prescription medicine for pain, take it as prescribed. ? If you are not taking a prescription pain medicine, ask your doctor if you can take an over-the-counter medicine. · If your doctor recommends a cervical collar, wear it exactly as directed. When should you call for help? Call your doctor now or seek immediate medical care if:    · You have new or worsening numbness in your arms, buttocks or legs.     · You have new or worsening weakness in your arms or legs.  (This could make it hard to stand up.)     · You lose control of your bladder or bowels.    Watch closely for changes in your health, and be sure to contact your doctor if:    · Your neck pain is getting worse.     · You are not getting better after 1 week.     · You do not get better as expected. Where can you learn more? Go to http://arben-bayron.info/. Enter [id number] in the search box to learn more about \"Neck Pain: Care Instructions. \"  Current as of: September 20, 2018  Content Version: 11.9  © 9747-2045 [employer]. Care instructions adapted under license by [employer] (which disclaims liability or warranty for this information). If you have questions about a medical condition or this instruction, always ask your healthcare professional. [address] any warranty or liability for your use of this information.

## 2019-05-16 ENCOUNTER — OFFICE VISIT (OUTPATIENT)
Dept: FAMILY MEDICINE CLINIC | Age: 54
End: 2019-05-16

## 2019-05-16 VITALS
TEMPERATURE: 97.9 F | SYSTOLIC BLOOD PRESSURE: 124 MMHG | HEIGHT: 71 IN | DIASTOLIC BLOOD PRESSURE: 77 MMHG | RESPIRATION RATE: 18 BRPM | WEIGHT: 217 LBS | BODY MASS INDEX: 30.38 KG/M2 | OXYGEN SATURATION: 97 % | HEART RATE: 66 BPM

## 2019-05-16 DIAGNOSIS — F41.9 ANXIETY: Primary | ICD-10-CM

## 2019-05-16 DIAGNOSIS — Z00.00 ANNUAL PHYSICAL EXAM: ICD-10-CM

## 2019-05-16 DIAGNOSIS — Z82.49 FAMILY HISTORY OF EARLY CAD: ICD-10-CM

## 2019-05-16 DIAGNOSIS — K22.70 BARRETT'S ESOPHAGUS WITHOUT DYSPLASIA: ICD-10-CM

## 2019-05-16 RX ORDER — ASPIRIN 81 MG/1
TABLET ORAL DAILY
COMMUNITY

## 2019-05-16 NOTE — PROGRESS NOTES
Antonina Silveira is a 48 y.o. male who presents for annual physical: 
 
Cholesterol elevated on recent labs. He reports having myalgia with a statin several years ago. He had a family h/o early CAD. No regular exercise but he does yard work 2-3 days a week. No sx with activity. No cp/sob. No neurological deficits. Family h/o early CAD: He had 2 brothers that  unexpectedly in their sleep. One brother  last month. Both in their early 46s. Cause of death was not confirmed but assumed heart attack. Both of his brothers drank ETOH heavily and did not live healthy lifestyles per the pt. He also had an uncle and cousin with early MI. He had a normal echo and holter monitor in 2017. He has never had a stress test.  He takes ASA 81mg daily. No h/o DM or HTN. H/o HL but no medications. He denies sx at rest or with activity. No cp/sob. No neurological deficits. No nausea or diaphoresis. Hadley's esophagus: Currently takes nexium. Last EGD with Dr. Markie Torres about 5 years ago. He is due for a screening EGD. Last colonoscopy was about 5 years ago as well and is due for another colonoscopy. Anxiety: Currently taking paxil 20mg - 1/2 tab daily. Denies side effects of the medication. He feels the anxiety is well controlled. H/o Lrhv-Luuxz-Ehysduq disease of the left hip. S/p KAMRYN with Dr. Sarah Cristobal. Doing well. Mild quad muscle soreness but overall his pain is much better and he is pleased with the results of the KAMRYN. PMHx: 
Past Medical History:  
Diagnosis Date  Anxiety  Arrhythmia   
 palpitations- saw cardiology 2017  Depression   
 -patient denies this 2017. states med is for Anxiety  Dyslipidemia  Esophagitis  GERD (gastroesophageal reflux disease)   
 barrets esophageus  Hemorrhoids, internal, with bleeding  Personal history of Luxv-Gzywp-Ajgkxpp disease  Sleep apnea Meds:  
Current Outpatient Medications Medication Sig Dispense Refill  aspirin delayed-release 81 mg tablet Take  by mouth daily.  varicella-zoster recombinant, PF, (SHINGRIX, PF,) 50 mcg/0.5 mL susr injection Give 1 injection now and second in 2-6 months. 0.5 mL 1  
 PARoxetine (PAXIL) 20 mg tablet TAKE 1 TABLET ORALLY ONCE DAILY 90 Tab 0  
 esomeprazole (NEXIUM) 20 mg capsule Take 40 mg by mouth every morning. Allergies:  
No Known Allergies Smoker: 
Social History Tobacco Use Smoking Status Former Smoker  Packs/day: 0.25  
 Years: 8.00  
 Pack years: 2.00  Last attempt to quit:   Years since quittin.3 Smokeless Tobacco Former User ETOH:  
Social History Substance and Sexual Activity Alcohol Use Yes  Alcohol/week: 3.0 oz  Types: 5 Glasses of wine per week FH:  
Family History Problem Relation Age of Onset  Heart Disease Mother  Heart Attack Mother   
     induced by gastric bleeding  COPD Father  Alcohol abuse Brother  Stroke Brother  Arthritis-osteo Sister Back surgery  Alcohol abuse Brother ROS: 
General/Constitutional:   No headache, fever, fatigue, weight loss or weight gain Eyes:   No redness, pruritis, pain, visual changes, swelling, or discharge Ears:    No pain, loss or changes in hearing Neck:   No swelling, masses, stiffness, pain, or limited movement Cardiac:    No chest pain Respiratory:   No cough or shortness of breath GI:   No nausea/vomiting, diarrhea, abdominal pain, bloody or dark stools :   No dysuria or  hematuria Neurological:   No loss of consciousness, dizziness, seizures, dysarthria, cognitive changes, memory changes,  problems with balance, or unilateral weakness Skin: No rash Physical Exam: 
Visit Vitals /77 (BP 1 Location: Left arm, BP Patient Position: Sitting) Pulse 66 Temp 97.9 °F (36.6 °C) (Oral) Resp 18 Ht 5' 11\" (1.803 m) Wt 217 lb (98.4 kg) SpO2 97% BMI 30.27 kg/m² GEN: No apparent distress. Alert and oriented and responds to all questions appropriately. EYES:  Conjunctiva clear; pupils round and reactive to light; extraocular movements are intact. EAR: External ears are normal.  Tympanic membranes are clear and without effusion. NOSE: Turbinates are within normal limits. No drainage OROPHYARYNX: No oral lesions or exudates. NECK:  Supple; no masses; thyroid normal          
LUNGS: Respirations unlabored; clear to auscultation bilaterally CARDIOVASCULAR: Regular, rate, and rhythm without murmurs, gallops or rubs ABDOMEN: Soft; nontender; nondistended;  
NEUROLOGIC:  No focal neurologic deficits. Strength and sensation grossly intact. Coordination and gait grossly intact. EXT: Well perfused. No edema. SKIN: No obvious rashes. Labs reviewed: 
Component Latest Ref Rng & Units 4/11/2019 4/11/2019 4/11/2019 4/11/2019  
 
     10:07 AM 10:07 AM 10:07 AM 10:07 AM  
WBC 
    3.4 - 10.8 x10E3/uL    4.5  
RBC 
    4.14 - 5.80 x10E6/uL    4.76 HGB 13.0 - 17.7 g/dL    15.5 HCT 
    37.5 - 51.0 %    45.2 MCV 
    79 - 97 fL    95 MCH 
    26.6 - 33.0 pg    32.6 MCHC 
    31.5 - 35.7 g/dL    34.3  
RDW 
    12.3 - 15.4 %    13.6 PLATELET 
    424 - 897 x10E3/uL    206 NEUTROPHILS Not Estab. %    44 Lymphocytes Not Estab. %    42 MONOCYTES Not Estab. %    10 EOSINOPHILS Not Estab. %    3  
BASOPHILS Not Estab. %    1  
ABS. NEUTROPHILS 
    1.4 - 7.0 x10E3/uL    2.0 Abs Lymphocytes 0.7 - 3.1 x10E3/uL    1.9  
ABS. MONOCYTES 
    0.1 - 0.9 x10E3/uL    0.4  
ABS. EOSINOPHILS 
    0.0 - 0.4 x10E3/uL    0.2  
ABS. BASOPHILS 
    0.0 - 0.2 x10E3/uL    0.0 IMMATURE GRANULOCYTES Not Estab. %    0  
ABS. IMM. GRANS. 0.0 - 0.1 x10E3/uL    0.0 Glucose 65 - 99 mg/dL   106 (H) BUN 
    6 - 24 mg/dL   15 Creatinine 
    0.76 - 1.27 mg/dL   1.09   
GFR est non-AA >59 mL/min/1.73   77 GFR est AA 
    >59 mL/min/1.73   89 BUN/Creatinine ratio 9 - 20   14 Sodium 134 - 144 mmol/L   140 Potassium 3.5 - 5.2 mmol/L   4.8 Chloride 96 - 106 mmol/L   100 CO2 
    20 - 29 mmol/L   22 Calcium 8.7 - 10.2 mg/dL   9.6 Protein, total 
    6.0 - 8.5 g/dL   7.0 Albumin 3.5 - 5.5 g/dL   4.7 GLOBULIN, TOTAL 
    1.5 - 4.5 g/dL   2.3 A-G Ratio 1.2 - 2.2   2.0 Bilirubin, total 
    0.0 - 1.2 mg/dL   0.6 Alk. phosphatase 39 - 117 IU/L   77 AST 
    0 - 40 IU/L   21   
ALT (SGPT) 0 - 44 IU/L   23 Cholesterol, total 
    100 - 199 mg/dL 249 (H) Triglyceride 0 - 149 mg/dL 87 HDL Cholesterol >39 mg/dL 51 VLDL, calculated 5 - 40 mg/dL 17 LDL, calculated 0 - 99 mg/dL 181 (H) TSH 
    0.450 - 4.500 uIU/mL  2.880 Assessment: ICD-10-CM ICD-9-CM 1. Anxiety F41.9 300.00   
2. Hadley's esophagus without dysplasia K22.70 530.85   
3. Family history of early CAD Z80.55 V17.3 ECHO STRESS 4. Annual physical exam Z00.00 V70.0 REFERRAL TO GASTROENTEROLOGY Plan: 
Cholesterol elevated. He does not want to start statin at this time. He had myalgia with a statin several years ago. He had a family h/o early CAD and discussed that statin could be a good preventive medication as well. He wants to try lifestyle modifications first.  Recheck labs in 6 months. Family h/o early CAD: referred for stress echo. He had a normal echo and holter monitor in 2017. He has never had a stress test.  Continue ASA 81mg daily. He decline statin today. BP WNL. Hadley's esophagus: Referred for screening EGD. Continue nexium Anxiety: Well controlled. Continue paxil 20mg - 1/2 tab daily. Referred for colonosocpy Rx for Shingrix given RTC: 6 months and PRN.

## 2019-05-16 NOTE — PATIENT INSTRUCTIONS
Stress Echo  May 22th arrive 9:00 [facility]. Pt aware Nothing to eat or drink after midnight .  Comfortable shoes and clothes Hold beta blockers and cardiac meds for 24 prior to test.

## 2019-05-16 NOTE — PROGRESS NOTES
Identified Patient with two Patient identifiers (Name and ). Two Patient Identifiers confirmed. Reviewed record in preparation for visit and have obtained necessary documentation. Chief Complaint Patient presents with  Follow-up Discuss Lab Results Visit Vitals /77 (BP 1 Location: Left arm, BP Patient Position: Sitting) Pulse 66 Temp 97.9 °F (36.6 °C) (Oral) Resp 18 Ht 5' 11\" (1.803 m) Wt 217 lb (98.4 kg) SpO2 97% BMI 30.27 kg/m² 1. Have you been to the ER, urgent care clinic since your last visit? Hospitalized since your last visit? No 
 
2. Have you seen or consulted any other health care providers outside of the [address] [place] [address] since your last visit? Include any pap smears or colon screening.  No

## 2019-05-22 ENCOUNTER — HOSPITAL ENCOUNTER (OUTPATIENT)
Dept: NON INVASIVE DIAGNOSTICS | Age: 54
Discharge: HOME | End: 2019-05-22
Attending: FAMILY MEDICINE
Payer: COMMERCIAL

## 2019-05-22 DIAGNOSIS — Z82.49 FAMILY HISTORY OF EARLY CAD: ICD-10-CM

## 2019-05-22 LAB
STRESS ANGINA INDEX: 0
STRESS ESTIMATED WORKLOAD: 11 METS
STRESS EXERCISE DUR MIN: NORMAL MIN:SEC
STRESS PERCENT HR ACHIEVED: 94 %
STRESS POST PEAK HR: 157 BPM
STRESS SR DUKE TREADMILL SCORE: 9
STRESS ST DEPRESSION: 0 MM
STRESS ST ELEVATION: 0 MM
STRESS TARGET HR: 167 BPM

## 2019-05-22 PROCEDURE — 93351 STRESS TTE COMPLETE: CPT

## 2019-07-31 ENCOUNTER — OFFICE VISIT (OUTPATIENT)
Dept: FAMILY MEDICINE CLINIC | Age: 54
End: 2019-07-31

## 2019-07-31 VITALS
HEIGHT: 71 IN | BODY MASS INDEX: 30.27 KG/M2 | DIASTOLIC BLOOD PRESSURE: 84 MMHG | TEMPERATURE: 98.1 F | SYSTOLIC BLOOD PRESSURE: 130 MMHG | OXYGEN SATURATION: 97 % | HEART RATE: 57 BPM

## 2019-07-31 DIAGNOSIS — M25.512 LEFT SHOULDER PAIN, UNSPECIFIED CHRONICITY: Primary | ICD-10-CM

## 2019-07-31 NOTE — PROGRESS NOTES
Chief Complaint   Patient presents with    Shoulder Pain     pt states left shoulder pain x 1 month after cutting trees     Health Maintenance Due   Topic    Shingrix Vaccine Age 50> (1 of 2)    FOBT Q 1 YEAR AGE 50-75        1. Have you been to the ER, urgent care clinic since your last visit? Hospitalized since your last visit? No    2. Have you seen or consulted any other health care providers outside of the [address] [place] [address] since your last visit? Include any pap smears or colon screening.  No

## 2019-08-01 NOTE — PROGRESS NOTES
History of Present Illness     Patient Identification  Geoffrey Stephenson is a 48 y.o. male complains of pain in the left shoulder pain for about 1 month. Started after trimming tree branches with a pruning saw which required him to mostly work with his arms overhead. No injury or trauma. No neck pain. No radicular sx. Past Medical History:   Diagnosis Date    Anxiety     Arrhythmia     palpitations- saw cardiology 01/13/2017    Depression     -patient denies this 12/13/2017. states med is for Anxiety    Dyslipidemia     Esophagitis     GERD (gastroesophageal reflux disease)     barrets esophageus     Hemorrhoids, internal, with bleeding     Personal history of Bzxh-Flarl-Whlrwib disease     Sleep apnea      Family History   Problem Relation Age of Onset    Heart Disease Mother     Heart Attack Mother         induced by gastric bleeding    COPD Father     Alcohol abuse Brother     Stroke Brother     Arthritis-osteo Sister         Back surgery    Alcohol abuse Brother      Current Outpatient Medications   Medication Sig Dispense Refill    aspirin delayed-release 81 mg tablet Take  by mouth daily.  PARoxetine (PAXIL) 20 mg tablet TAKE 1 TABLET ORALLY ONCE DAILY 90 Tab 0    esomeprazole (NEXIUM) 20 mg capsule Take 40 mg by mouth every morning.  varicella-zoster recombinant, PF, (SHINGRIX, PF,) 50 mcg/0.5 mL susr injection Give 1 injection now and second in 2-6 months. 0.5 mL 1     No Known Allergies    Review of Systems  Pertinent items are noted in HPI. Physical Exam     Visit Vitals  /84 (BP 1 Location: Left arm, BP Patient Position: Sitting)   Pulse (!) 57   Temp 98.1 °F (36.7 °C) (Oral)   Ht 5' 11\" (1.803 m)   SpO2 97%   BMI 30.27 kg/m²       GEN: Well appearing. No apparent distress. Responds to all questions appropriately. Lungs: No labored respirations. Talking in complete sentences without difficulty.   Shoulder: left  Deformity: None    ROM:  Forward Flexion: Active: 180     ER (0): Active: 45     IR (0): Active: Behind the body to the level T7  Abduction: Active: 180       Palpation:  AC tenderness: tender  SC tenderness: None  Clavicle tenderness: None  Biceps tenderness: None    Strength (0-5/5):  Deltoid - Anterior: 5/5  Deltoid - Posterior: 5/5  Deltoid - Mid: 5/5  Supraspinatus: 5/5  External rotation: 5/5  Internal rotation: 5/5    Rotator Cuff Exam:  Neers sign: Negative  Rutherford sign: positive  Painful Arc: Negative  Lift-off sign / Belly Press: Negative    Cross-chest adduction: positive    Neuro/Vascular:  Pulses intact, no edema, and neurologically intact    C-Spine:  Cervical motion: FROM without pain. Cervical tenderness: None    Skin: No obvious rash or skin breakdown. Imaging: Radiographs of the left shoulder personally reviewed and demonstrates no obvious fracture or dislocation. AC degenerative changes present. Assessment:    ICD-10-CM ICD-9-CM    1. Left shoulder pain, unspecified chronicity M25.512 719.41 XR SHOULDER LT AP/LAT MIN 2 V   [facility]. Discussed treatment options. He would like to continue conservative treatment. Declined corticosteroid injection but he will reconsider if not improving or worsening sx. Plan:  1. Home Exercise Program as per handout. 2. Ice 15 minutes, three times a day PRN and after exercise. Can alternate with heat for 15 minutes. Medications:    1. Naproxin (Aleve): 220mg 1-2 tablets twice a day PRN. 2. Acetaminophen (Tylenol):  500mg 1-2 tablets every 6 hours as needed for pain.     RTC: PRN

## 2019-08-06 ENCOUNTER — OFFICE VISIT (OUTPATIENT)
Dept: SLEEP MEDICINE | Age: 54
End: 2019-08-06

## 2019-08-06 VITALS
HEIGHT: 71 IN | OXYGEN SATURATION: 98 % | WEIGHT: 216.1 LBS | BODY MASS INDEX: 30.25 KG/M2 | DIASTOLIC BLOOD PRESSURE: 88 MMHG | SYSTOLIC BLOOD PRESSURE: 135 MMHG | HEART RATE: 66 BPM

## 2019-08-06 DIAGNOSIS — Z86.59 HISTORY OF DEPRESSION: ICD-10-CM

## 2019-08-06 DIAGNOSIS — G47.33 OSA (OBSTRUCTIVE SLEEP APNEA): Primary | ICD-10-CM

## 2019-08-06 NOTE — PROGRESS NOTES
[address] [place]. [place], [address]   Tel.  [phone]   Fax. [address] [facility] [address]   [place], [address] [facility]   Tel.  [phone]   Fax. [phone] [address]   Tel.  [phone]   Fax. [phone]       Subjective:   Lilli Phillips is a 48 y.o. male seen for a positive airway pressure follow-up, last seen by Dr. Angie Garcia on 2/28/2017. Home sleep test 11/2014 showed AHI of 5.2/hr with a lowest SaO2 of 86%. Here today for follow up and supplies. He reports no problems using the device. The following concerns reviewed:    Drowsiness no Problems exhaling no   Snoring no Forget to put on no   Mask Comfortable yes Can't fall asleep no   Dry Mouth no Mask falls off no   Air Leaking no Frequent awakenings no       He admits that his sleep has improved on PAP therapy using nasal pillows mask and heated tubing. Review of device download indicated:  Auto pressure: 5-10 cmH2O; 95th Percentile Leak: 0 L/Minute; % Used Days >= 4 hours: 87. Therapy Apnea Index averaged over PAP use: 2.1 /hr which reflects improved sleep breathing condition. [place] Sleepiness Score: 1 and Modified F.O.S.Q. Score Total / 2: 16.5 which reflects improved sleep quality over therapy time. No Known Allergies    He has a current medication list which includes the following prescription(s): aspirin delayed-release, paroxetine, esomeprazole, and varicella-zoster recombinant (pf). .      He  has a past medical history of Anxiety, Arrhythmia, Depression, Dyslipidemia, Esophagitis, GERD (gastroesophageal reflux disease), Hemorrhoids, internal, with bleeding, Personal history of Gici-Wskub-Oxkxhxj disease, and Sleep apnea. Sleep Review of Systems: notable for Negative difficulty falling asleep; Positive awakenings at night; Positive perceived regular dreaming; Negative nightmares; Negative early morning headaches; Negative memory problems;  Negative concentration issues; Negative chest pain; Negative shortness of breath;Positive  significant joint pain at night; Negative significant muscle pain at night; Negative rashes or itching; Negative heartburn; Negative significant mood issues; 0 afternoon naps per week;  Positive caffeine;  Negative history of any automobile or occupational accidents due to daytime drowsiness      Objective:     Visit Vitals  /88 (BP 1 Location: Left arm)   Pulse 66   Ht 5' 11\" (1.803 m)   Wt 216 lb 1.6 oz (98 kg)   SpO2 98%   BMI 30.14 kg/m²            General:   Alert, oriented, not in acute distress   Eyes:  Anicteric Sclerae; no obvious strabismus   Nose:  No obvious nasal septum deviation    Oropharynx:   Mallampati score 4, thick tongue base, uvula not seen due to low-lying soft palate, narrow tonsilo-pharyngeal pilars   Neck:   Midline trachea   Chest/Lungs:  Symmetrical lung expansion, clear lung fields on auscultation    CVS:  Normal rate, regular rhythm,  no JVD   Extremities:  No obvious rashes, no edema    Neuro:  No focal deficits; No obvious tremor    Psych:  Normal affect,  normal countenance       Assessment:       ICD-10-CM ICD-9-CM    1. MARTIN (obstructive sleep apnea) G47.33 327.23 AMB SUPPLY ORDER   2. History of depression Z86.59 V11.8    3. Adult BMI 30.0-30.9 kg/sq m Z68.30 V85.30        AHI = 5.2(11/2014). On CPAP :  5-10 cmH2O. He is compliant with PAP therapy and PAP continues to benefit patient and remains necessary for control of his sleep apnea. Plan:     Follow-up and Dispositions    · Return in about 1 year (around 8/6/2020). *Continue on current pressures    * Supplies ordered - nasal pillows mask and heated tubing    Orders Placed This Encounter    AMB SUPPLY ORDER     Diagnosis: (G47.33) MARTIN (obstructive sleep apnea)  (primary encounter diagnosis)     Replacement Supplies for Positive Airway Pressure Therapy Device:   Duration of need: 99 months.       Nasal Pillows Combo Mask (Replace) 2 per month.  Nasal Pillows (Replace) 2 per month.  Headgear 1 every 6 months.  Tubing with heating element 1 every 3 months.  Filter(s) Disposable 2 per month.  Filter(s) Non-Disposable 1 every 6 months. .   [address] for Humidifier (Replace) 1 every 6 months. ADRIANO Schofield; NPI: [id number]    Electronically signed. Date:- 08/06/19       * Counseling was provided regarding the importance of regular PAP use with emphasis on ensuring sufficient total sleep time, proper sleep hygiene, and safe driving. * Re-enforced proper and regular cleaning for the device. * He was asked to contact our office for any problems regarding PAP therapy. 2. H/O Depression - continue on current regimen. 3. Recommended a dedicated weight loss program through appropriate diet and exercise regimen as significant weight reduction has been shown to reduce severity of obstructive sleep apnea. ADRIANO Schofield, [address] [place] [address]  Electronically signed.  08/06/19

## 2019-08-06 NOTE — PATIENT INSTRUCTIONS
[address] [facility] [place]. [place], [address]  Tel.  [phone]  Fax. 100 [facility] 60  [place], [address] [facility]  Tel.  [phone]  Fax. [phone] [address]  Tel.  [phone]  Fax. [phone]     Learning About CPAP for Sleep Apnea  What is CPAP? CPAP is a small machine that you use at home every night while you sleep. It increases air pressure in your throat to keep your airway open. When you have sleep apnea, this can help you sleep better so you feel much better. CPAP stands for \"continuous positive airway pressure. \"  The CPAP machine will have one of the following:  · A mask that covers your nose and mouth  · Prongs that fit into your nose  · A mask that covers your nose only, the most common type. This type is called NCPAP. The N stands for \"nasal.\"  Why is it done? CPAP is usually the best treatment for obstructive sleep apnea. It is the first treatment choice and the most widely used. Your doctor may suggest CPAP if you have:  · Moderate to severe sleep apnea. · Sleep apnea and coronary artery disease (CAD) or heart failure. How does it help? · CPAP can help you have more normal sleep, so you feel less sleepy and more alert during the daytime. · CPAP may help keep heart failure or other heart problems from getting worse. · NCPAP may help lower your blood pressure. · If you use CPAP, your bed partner may also sleep better because you are not snoring or restless. What are the side effects? Some people who use CPAP have:  · A dry or stuffy nose and a sore throat. · Irritated skin on the face. · Sore eyes. · Bloating. If you have any of these problems, work with your doctor to fix them. Here are some things you can try:  · Be sure the mask or nasal prongs fit well. · See if your doctor can adjust the pressure of your CPAP. · If your nose is dry, try a humidifier.   · If your nose is runny or stuffy, try decongestant medicine or a steroid nasal spray. If these things do not help, you might try a different type of machine. Some machines have air pressure that adjusts on its own. Others have air pressures that are different when you breathe in than when you breathe out. This may reduce discomfort caused by too much pressure in your nose. Where can you learn more? Go to Geoforce.be  Enter Cade Teixeira in the search box to learn more about \"Learning About CPAP for Sleep Apnea. \"   © 2427-2346 Healthwise, Incorporated. Care instructions adapted under license by [employer] (which disclaims liability or warranty for this information). This care instruction is for use with your licensed healthcare professional. If you have questions about a medical condition or this instruction, always ask your healthcare professional. [address] any warranty or liability for your use of this information. Content Version: 9.5.56616; Last Revised: January 11, 2010  PROPER SLEEP HYGIENE    What to avoid  · Do not have drinks with caffeine, such as coffee or black tea, for 8 hours before bed. · Do not smoke or use other types of tobacco near bedtime. Nicotine is a stimulant and can keep you awake. · Avoid drinking alcohol late in the evening, because it can cause you to wake in the middle of the night. · Do not eat a big meal close to bedtime. If you are hungry, eat a light snack. · Do not drink a lot of water close to bedtime, because the need to urinate may wake you up during the night. · Do not read or watch TV in bed. Use the bed only for sleeping and sexual activity. What to try  · Go to bed at the same time every night, and wake up at the same time every morning. Do not take naps during the day. · Keep your bedroom quiet, dark, and cool. · Get regular exercise, but not within 3 to 4 hours of your bedtime. .  · Sleep on a comfortable pillow and mattress.   · If watching the clock makes you anxious, turn it facing away from you so you cannot see the time. · If you worry when you lie down, start a worry book. Well before bedtime, write down your worries, and then set the book and your concerns aside. · Try meditation or other relaxation techniques before you go to bed. · If you cannot fall asleep, get up and go to another room until you feel sleepy. Do something relaxing. Repeat your bedtime routine before you go to bed again. · Make your house quiet and calm about an hour before bedtime. Turn down the lights, turn off the TV, log off the computer, and turn down the volume on music. This can help you relax after a busy day. Drowsy Driving: The [employer] cites drowsiness as a causing factor in more than 683,639 police reported crashes annually, resulting in 76,000 injuries and 1,500 deaths. Other surveys suggest 55% of people polled have driven while drowsy in the past year, 23% had fallen asleep but not crashed, 3% crashed, and 2% had and accident due to drowsy driving. Who is at risk? Young Drivers: One study of drowsy driving accidents states that 55% of the drivers were under 25 years. Of those, 75% were male. Shift Workers and Travelers: People who work overnight or travel across time zones frequently are at higher risk of experiencing Circadian Rhythm Disorders. They are trying to work and function when their body is programed to sleep. Sleep Deprived: Lack of sleep has a serious impact on your ability to pay attention or focus on a task. Consistently getting less than the average of 8 hours your body needs creates partial or cumulative sleep deprivation. Untreated Sleep Disorders: Sleep Apnea, Narcolepsy, R.L.S., and other sleep disorders (untreated) prevent a person from getting enough restful sleep. This leads to excessive daytime sleepiness and increases the risk for drowsy driving accidents by up to 7 times.   Medications / Alcohol: Even over the counter medications can cause drowsiness. Medications that impair a drivers attention should have a warning label. Alcohol naturally makes you sleepy and on its own can cause accidents. Combined with excessive drowsiness its effects are amplified. Signs of Drowsy Driving:   * You don't remember driving the last few miles   * You may drift out of your daphney   * You are unable to focus and your thoughts wander   * You may yawn more often than normal   * You have difficulty keeping your eyes open / nodding off   * Missing traffic signs, speeding, or tailgating  Prevention-   Good sleep hygiene, lifestyle and behavioral choices have the most impact on drowsy driving. There is no substitute for sleep and the average person requires 8 hours nightly. If you find yourself driving drowsy, stop and sleep. Consider the sleep hygiene tips provided during your visit as well. Medication Refill Policy: Refills for all medications require 1 week advance notice. Please have your pharmacy fax a refill request. We are unable to fax, or call in \"controled substance\" medications and you will need to pick these prescriptions up from our office. [employer]os Activation    Thank you for requesting access to [employer]. Please follow the instructions below to securely access and download your online medical record. [employer]os allows you to send messages to your doctor, view your test results, renew your prescriptions, schedule appointments, and more. How Do I Sign Up? 1. In your internet browser, go to [email or web address]. 2. Click on the First Time User? Click Here link in the Sign In box. You will see the New Member Sign Up page. 3. Enter your Curioos Access Code exactly as it appears below. You will not need to use this code after youve completed the sign-up process. If you do not sign up before the expiration date, you must request a new code.     Cur[employer]os Access Code: [email or web address]  Expires: 2019  3:38 PM (This is the date your Roc2Loc access code will )    4. Enter the last four digits of your Social Security Number (xxxx) and Date of Birth (mm/dd/yyyy) as indicated and click Submit. You will be taken to the next sign-up page. 5. Create a Roc2Loc ID. This will be your Roc2Loc login ID and cannot be changed, so think of one that is secure and easy to remember. 6. Create a Roc2Loc password. You can change your password at any time. 7. Enter your Password Reset Question and Answer. This can be used at a later time if you forget your password. 8. Enter your e-mail address. You will receive e-mail notification when new information is available in [address] 9. Click Sign Up. You can now view and download portions of your medical record. 10. Click the Download Summary menu link to download a portable copy of your medical information. Additional Information    If you have questions, please call [phone]. Remember, Roc2Loc is NOT to be used for urgent needs. For medical emergencies, dial 911.

## 2019-08-07 ENCOUNTER — DOCUMENTATION ONLY (OUTPATIENT)
Dept: SLEEP MEDICINE | Age: 54
End: 2019-08-07

## 2019-08-16 ENCOUNTER — DOCUMENTATION ONLY (OUTPATIENT)
Dept: SLEEP MEDICINE | Age: 54
End: 2019-08-16

## 2019-09-04 ENCOUNTER — OFFICE VISIT (OUTPATIENT)
Dept: FAMILY MEDICINE CLINIC | Age: 54
End: 2019-09-04

## 2019-09-04 VITALS
RESPIRATION RATE: 16 BRPM | WEIGHT: 218 LBS | HEART RATE: 71 BPM | BODY MASS INDEX: 30.52 KG/M2 | SYSTOLIC BLOOD PRESSURE: 126 MMHG | TEMPERATURE: 98.1 F | OXYGEN SATURATION: 97 % | HEIGHT: 71 IN | DIASTOLIC BLOOD PRESSURE: 84 MMHG

## 2019-09-04 DIAGNOSIS — M25.571 ACUTE RIGHT ANKLE PAIN: Primary | ICD-10-CM

## 2019-09-04 NOTE — PATIENT INSTRUCTIONS
Ankle Sprain: Rehab Exercises  Introduction  Here are some examples of exercises for you to try. The exercises may be suggested for a condition or for rehabilitation. Start each exercise slowly. Ease off the exercises if you start to have pain. You will be told when to start these exercises and which ones will work best for you. How to do the exercises  \"Alphabet\" exercise    1. Trace the alphabet with your toe. This helps your ankle move in all directions. Side-to-side knee swing exercise    1. Sit in a chair with your foot flat on the floor. 2. Slowly move your knee from side to side. Keep your foot pressed flat. 3. Continue this exercise for 2 to 3 minutes. Towel curl    1. While sitting, place your foot on a towel on the floor. Scrunch the towel toward you with your toes. 2. Then use your toes to push the towel away from you. 3. To make this exercise more challenging you can put something on the other end of the towel. A can of soup is about the right weight for this. Towel stretch    1. Sit with your legs extended and knees straight. 2. Place a towel around your foot just under the toes. 3. Hold each end of the towel in each hand, with your hands above your knees. 4. Pull back with the towel so that your foot stretches toward you. 5. Hold the position for at least 15 to 30 seconds. 6. Repeat 2 to 4 times a session. Do up to 5 sessions a day. Ankle eversion exercise    1. Start by sitting with your foot flat on the floor. Push your foot outward against a wall or a piece of furniture that doesn't move. Hold for about 6 seconds, and relax. Repeat 8 to 12 times. 2. After you feel comfortable with this, try using rubber tubing looped around the outside of your feet for resistance. Push your foot out to the side against the tubing, and then count to 10 as you slowly bring your foot back to the middle. Repeat 8 to 12 times. Isometric opposition exercises    1.  While sitting, put your feet together flat on the floor. 2. Press your injured foot inward against your other foot. Hold for about 6 seconds, and relax. Repeat 8 to 12 times. 3. Then place the heel of your other foot on top of the injured one. Push down with the top heel while trying to push up with your injured foot. Hold for about 6 seconds, and relax. Repeat 8 to 12 times. Resisted ankle inversion    1. Sit on the floor with your good leg crossed over your other leg. 2. Hold both ends of an exercise band and loop the band around the inside of your affected foot. Then press your other foot against the band. 3. Keeping your legs crossed, slowly push your affected foot against the band so that foot moves away from your other foot. Then slowly relax. 4. Repeat 8 to 12 times. Resisted ankle eversion    1. Sit on the floor with your legs straight. 2. Hold both ends of an exercise band and loop the band around the outside of your affected foot. Then press your other foot against the band. 3. Keeping your leg straight, slowly push your affected foot outward against the band and away from your other foot without letting your leg rotate. Then slowly relax. 4. Repeat 8 to 12 times. Resisted ankle dorsiflexion    1. Tie the ends of an exercise band together to form a loop. Attach one end of the loop to a secure object or shut a door on it to hold it in place. (Or you can have someone hold one end of the loop to provide resistance.)  2. While sitting on the floor or in a chair, loop the other end of the band over the top of your affected foot. 3. Keeping your knee and leg straight, slowly flex your foot to pull back on the exercise band, and then slowly relax. 4. Repeat 8 to 12 times. Single-leg balance    1. Stand on a flat surface with your arms stretched out to your sides like you are making the letter \"T. \" Then lift your good leg off the floor, bending it at the knee.  If you are not steady on your feet, use one hand to hold on to a chair, counter, or wall. 2. Standing on the leg with your affected ankle, keep that knee straight. Try to balance on that leg for up to 30 seconds. Then rest for up to 10 seconds. 3. Repeat 6 to 8 times. 4. When you can balance on your affected leg for 30 seconds with your eyes open, try to balance on it with your eyes closed. 5. When you can do this exercise with your eyes closed for 30 seconds and with ease and no pain, try standing on a pillow or piece of foam, and repeat steps 1 through 4. Follow-up care is a key part of your treatment and safety. Be sure to make and go to all appointments, and call your doctor if you are having problems. It's also a good idea to know your test results and keep a list of the medicines you take. Where can you learn more? Go to http://arben-bayron.info/. Stacey Cox in the search box to learn more about \"Ankle Sprain: Rehab Exercises. \"  Current as of: September 20, 2018  Content Version: 12.1  © 3821-3796 Healthwise, Incorporated. Care instructions adapted under license by [employer] (which disclaims liability or warranty for this information). If you have questions about a medical condition or this instruction, always ask your healthcare professional. [address] any warranty or liability for your use of this information.

## 2019-09-04 NOTE — PROGRESS NOTES
Identified Patient with two Patient identifiers (Name and ). Two Patient Identifiers confirmed. Reviewed record in preparation for visit and have obtained necessary documentation. Chief Complaint   Patient presents with    Ankle Injury     patient rolled right ankle  and felt a pop. 7/10 pain       Visit Vitals  /84 (BP 1 Location: Right arm, BP Patient Position: Sitting)   Pulse 71   Temp 98.1 °F (36.7 °C) (Oral)   Resp 16   Ht 5' 11\" (1.803 m)   Wt 218 lb (98.9 kg)   SpO2 97%   BMI 30.40 kg/m²       1. Have you been to the ER, urgent care clinic since your last visit? Hospitalized since your last visit? No    2. Have you seen or consulted any other health care providers outside of the [address] [place] [address] since your last visit? Include any pap smears or colon screening.  No

## 2019-09-04 NOTE — PROGRESS NOTES
Subjective:     Ezra Tariq is an 48 y.o. male who presents with right ankle pain. Rolled ankle with inversion motion. on 9/1. Felt a pop and then passed out because the pain was so severe. He says pain is getting better since Sunday. Swelling is improving, bruising is moving down into his foot. Most pain is near the medial and lateral malleoli. Plantar and dorsiflexion is maintained but painful. Had ice on it. Not taking any PO meds. Wore an ankle brace for a few days, but went to work today and was walking around on it. Wearing shoe without difficulty. Gait is antalgic. Has been elevating the foot while he was at home. No numbness or tingling in toes. History of rolling the ankle x2. Wore a boot in the past with recovery. Denied chest pain, shortness of breath, abdominal pain. Past Medical History:   Diagnosis Date    Anxiety     Arrhythmia     palpitations- saw cardiology 01/13/2017    Depression     -patient denies this 12/13/2017. states med is for Anxiety    Dyslipidemia     Esophagitis     GERD (gastroesophageal reflux disease)     barrets esophageus     Hemorrhoids, internal, with bleeding     Personal history of Oqtj-Xawfu-Pegxdiv disease     Sleep apnea      Family History   Problem Relation Age of Onset    Heart Disease Mother     Heart Attack Mother         induced by gastric bleeding    COPD Father     Alcohol abuse Brother     Stroke Brother     Arthritis-osteo Sister         Back surgery    Alcohol abuse Brother      Current Outpatient Medications   Medication Sig Dispense Refill    NIACIN PO Take  by mouth.  PARoxetine (PAXIL) 20 mg tablet TAKE 1 TABLET ORALLY ONCE DAILY 90 Tab 1    aspirin delayed-release 81 mg tablet Take  by mouth daily.  esomeprazole (NEXIUM) 20 mg capsule Take 40 mg by mouth every morning.  varicella-zoster recombinant, PF, (SHINGRIX, PF,) 50 mcg/0.5 mL susr injection Give 1 injection now and second in 2-6 months.  0.5 mL 1 No Known Allergies  Social History     Socioeconomic History    Marital status: SINGLE     Spouse name: Not on file    Number of children: Not on file    Years of education: Not on file    Highest education level: Not on file   Occupational History    Not on file   Social Needs    Financial resource strain: Not on file    Food insecurity:     Worry: Not on file     Inability: Not on file    Transportation needs:     Medical: Not on file     Non-medical: Not on file   Tobacco Use    Smoking status: Former Smoker     Packs/day: 0.25     Years: 8.00     Pack years: 2.00     Last attempt to quit:      Years since quittin.6    Smokeless tobacco: Former User   Substance and Sexual Activity    Alcohol use: Yes     Alcohol/week: 5.0 standard drinks     Types: 5 Glasses of wine per week    Drug use: Not Currently     Types: Marijuana     Comment: Every once in awhile    Sexual activity: Yes   Lifestyle    Physical activity:     Days per week: Not on file     Minutes per session: Not on file    Stress: Not on file   Relationships    Social connections:     Talks on phone: Not on file     Gets together: Not on file     Attends [employer] service: Not on file     Active member of club or organization: Not on file     Attends meetings of clubs or organizations: Not on file     Relationship status: Not on file    Intimate partner violence:     Fear of current or ex partner: Not on file     Emotionally abused: Not on file     Physically abused: Not on file     Forced sexual activity: Not on file   Other Topics Concern    Not on file   Social History Narrative    Not on file       Review of Systems  A comprehensive review of systems was negative except for that written in the HPI.     Objective:     Visit Vitals  /84 (BP 1 Location: Right arm, BP Patient Position: Sitting)   Pulse 71   Temp 98.1 °F (36.7 °C) (Oral)   Resp 16   Ht 5' 11\" (1.803 m)   Wt 218 lb (98.9 kg)   SpO2 97%   BMI 30.40 kg/m² Gen: No apparent distress. Alert and oriented. Responds to all questions appropriately. Ankle/Foot: right  Ankle Effusion: None  Great Toe MTP (normal/valgus/hallux rigidis/varus): Normal    ROM:  Plantarflexion: 0 deg  Dorsiflexion: 20 deg  First MTP Joint: 30 deg  Inversion: 10 deg with pain  Eversion: 10 deg with pain      Dynamic Test:  Gait: Antalgic    Palpation:  ATFL tenderness: Yes  CFL tenderness: None  PTFL tenderness: Yes  Deltoid tenderness: Yes  Achilles insertion tenderness: None   Achilles tendon tenderness: None   Great Toe IP joint tenderness: None  Great Toe MTP join tenderness t: None  Lisfranc Joint tenderness: None  Medial Malleolus tenderness: None  Lateral Malleolus tenderness: None  5th Metatarsal tenderness: None  Metatarsals tenderness: None  Navicular tenderness: None  Plantar fascia heel tenderness: None    Instability:  Anterior Drawer: Normal  Talar Tilt: Pain  Syndesmosis Tenderness: None  Squeeze Test: Normal    Strength (0-5/5):   Plantarflexion: 5/5  Dorsiflexion: 5/5  Inversion: 5/5  Eversion: 5/5  FHL: 5/5  EHL: 5/5    Neuro/Vascular:  Pulses intact, no edema, and neurologically intact. Skin: bruising near lateral malleolus, diffuse swelling at medial and lateral malleolus    Imaging:  R ankle x-ray reviewed- no evidence of fracture. Past injuries with bony islands at medial and lateral malleolus. Assessment:       ICD-10-CM ICD-9-CM    1. Acute right ankle pain M25.571 719.47 XR ANKLE RT MIN 3 V     338.19      Plan:     1. Acute right ankle pain: x-ray done today without evidence of acute fracture. Evidence of old injury at lateral and medial malleolus.  Ankle sprain.  -air splint over sock- can wear shoes  -given exercises for ROM of ankle (advised against resistance band exercises at this time)  -ok for PO NSAIDs- he does not want to use at this time  - XR ANKLE RT MIN 3 V; Future- no evidence of fracture    RTC: GIL Aguirre, DO  Primary Care/Sports Medicine Fellow

## 2019-10-29 ENCOUNTER — OFFICE VISIT (OUTPATIENT)
Dept: FAMILY MEDICINE CLINIC | Age: 54
End: 2019-10-29

## 2019-10-29 VITALS
TEMPERATURE: 98.4 F | HEART RATE: 64 BPM | OXYGEN SATURATION: 97 % | HEIGHT: 71 IN | RESPIRATION RATE: 14 BRPM | SYSTOLIC BLOOD PRESSURE: 122 MMHG | DIASTOLIC BLOOD PRESSURE: 75 MMHG | WEIGHT: 219 LBS | BODY MASS INDEX: 30.66 KG/M2

## 2019-10-29 DIAGNOSIS — M25.512 LEFT SHOULDER PAIN, UNSPECIFIED CHRONICITY: Primary | ICD-10-CM

## 2019-10-29 DIAGNOSIS — M75.42 IMPINGEMENT SYNDROME OF LEFT SHOULDER: ICD-10-CM

## 2019-10-29 RX ORDER — BETAMETHASONE SODIUM PHOSPHATE AND BETAMETHASONE ACETATE 3; 3 MG/ML; MG/ML
6 INJECTION, SUSPENSION INTRA-ARTICULAR; INTRALESIONAL; INTRAMUSCULAR; SOFT TISSUE ONCE
Qty: 2 ML | Refills: 0
Start: 2019-10-29 | End: 2019-10-29

## 2019-10-29 RX ORDER — LIDOCAINE HYDROCHLORIDE 10 MG/ML
3 INJECTION INFILTRATION; PERINEURAL ONCE
Qty: 3 ML | Refills: 0
Start: 2019-10-29 | End: 2019-10-29

## 2019-10-29 NOTE — PROGRESS NOTES
Chief Complaint   Patient presents with    Shoulder Pain     pt states left shoulder pain x 6 months requesting injection today     1. Have you been to the ER, urgent care clinic since your last visit? Hospitalized since your last visit? No    2. Have you seen or consulted any other health care providers outside of the [address] [place] [address] since your last visit? Include any pap smears or colon screening. No    [facility]  OFFICE PROCEDURE PROGRESS NOTE        Chart reviewed for the following:   I, Dr. Lauryn Saez, have reviewed the History, Physical and updated the Allergic reactions for Rah A [address] performed immediately prior to start of procedure:   IDr. Lauryn, have performed the following reviews on Sanju Hand prior to the start of the procedure:            * Patient was identified by name and date of birth   * Agreement on procedure being performed was verified  * Risks and Benefits explained to the patient  * Procedure site verified and marked as necessary  * Patient was positioned for comfort  * Consent was signed and verified     Time: 14:50pm      Date of procedure: 10/29/2019    Procedure performed by:  Adrian Watson MD    Provider assisted by:  Sabine Lagunas LPN    Patient assisted by: self    How tolerated by patient: tolerated the procedure well with no complications    Post Procedural Pain Scale: 0 - No Hurt    Comments: none

## 2019-10-30 NOTE — PROGRESS NOTES
History of Present Illness     Patient Identification  Saad Yu is a 48 y.o. male complains of pain in the left shoulder pain. Started 3 months ago after cutting tree limbs. He was seen in clinic and treatment options discussed. He elected to proceed with HEP. Sx have not improved and he would like to proceed with corticosteroid injection today. No new injury. Pain over the lateral shoulder. No neck pain. No radiating pain. Past Medical History:   Diagnosis Date    Anxiety     Arrhythmia     palpitations- saw cardiology 01/13/2017    Depression     -patient denies this 12/13/2017. states med is for Anxiety    Dyslipidemia     Esophagitis     GERD (gastroesophageal reflux disease)     barrets esophageus     Hemorrhoids, internal, with bleeding     Personal history of Etyf-Eault-Fjukyoo disease     Sleep apnea      Family History   Problem Relation Age of Onset    Heart Disease Mother     Heart Attack Mother         induced by gastric bleeding    COPD Father     Alcohol abuse Brother     Stroke Brother     Arthritis-osteo Sister         Back surgery    Alcohol abuse Brother      Current Outpatient Medications   Medication Sig Dispense Refill    NIACIN PO Take  by mouth.  PARoxetine (PAXIL) 20 mg tablet TAKE 1 TABLET ORALLY ONCE DAILY 90 Tab 1    aspirin delayed-release 81 mg tablet Take  by mouth daily.  esomeprazole (NEXIUM) 20 mg capsule Take 40 mg by mouth every morning.  varicella-zoster recombinant, PF, (SHINGRIX, PF,) 50 mcg/0.5 mL susr injection Give 1 injection now and second in 2-6 months. 0.5 mL 1     No Known Allergies    Review of Systems  Pertinent items are noted in HPI. Physical Exam     Visit Vitals  /75 (BP 1 Location: Right arm, BP Patient Position: Sitting)   Pulse 64   Temp 98.4 °F (36.9 °C) (Oral)   Resp 14   Ht 5' 11\" (1.803 m)   Wt 219 lb (99.3 kg)   SpO2 97%   BMI 30.54 kg/m²       GEN: Well appearing. No apparent distress.  Responds to all questions appropriately. Lungs: No labored respirations. Talking in complete sentences without difficulty. Shoulder: left  Deformity: None    ROM: Pain with forward flexion, abduction and IR. Forward Flexion: Active: 180     ER (0): Active: 45     IR (0): Active: Behind the body to the level T7  Abduction: Active: 180      Palpation:  AC tenderness: None  SC tenderness: None  Clavicle tenderness: None  Biceps tenderness: None    Strength (0-5/5):  Deltoid - Anterior: 5/5  Deltoid - Posterior: 5/5  Deltoid - Mid: 5/5  Supraspinatus: 5/5  External rotation: 5/5  Internal rotation: 5/5    Rotator Cuff Exam:  Neers sign: positive  Rutherford sign: positive  Painful Arc: Negative  Lift-off sign / Belly Press: Negative    Neuro/Vascular:  Pulses intact, no edema, and neurologically intact    C-Spine:  Cervical motion: FROM without pain. Cervical tenderness: None    Skin: No obvious rash or skin breakdown. PROCEDURE NOTE:     Informed consent obtained verbally and risks, benefits and alternatives discussed. Time out performed, cross checking patient ID and procedure. The left shoulder was cleaned and prepped with sterile technique using Chloraprep x3 and anesthetized with ethyl chloride spray. The acromion, deltoid, supraspinatus, proximal humerus, and subacromial bursa were identified with ultrasound and the shoulder was injected in the subacromial bursa from a lateral approach with Celestone 12 mg and 3 ml of 1% lidocaine under sterile conditions using ultrasound guidance. The patient tolerated the procedure well and there were no complications. Assessment:    ICD-10-CM ICD-9-CM    1. Left shoulder pain, unspecified chronicity M25.512 719.41 LIDOCAINE INJECTION      BETAMETHASONE ACETATE & SODIUM PHOSPHATE INJECTION 3 MG EA. AMB POS US DRAIN/INJECT LARGE JOINT/BURSA   2. Impingement syndrome of left shoulder M75.42 726.2 AMB POS US DRAIN/INJECT LARGE JOINT/BURSA       Plan:  1.  Home Exercise Program as per handout. 2. Ice 15 minutes, three times a day PRN and after exercise. Can alternate with heat for 15 minutes. 3. Corticosteroid injection as above. Medications:    1. Naproxin (Aleve): 220mg 1-2 tablets twice a day PRN. 2. Acetaminophen (Tylenol):  500mg 1-2 tablets every 6 hours as needed for pain.     RTC: PRN

## 2020-01-27 ENCOUNTER — OFFICE VISIT (OUTPATIENT)
Dept: FAMILY MEDICINE CLINIC | Age: 55
End: 2020-01-27

## 2020-01-27 VITALS
RESPIRATION RATE: 18 BRPM | HEIGHT: 71 IN | TEMPERATURE: 99.7 F | WEIGHT: 216.2 LBS | HEART RATE: 77 BPM | DIASTOLIC BLOOD PRESSURE: 85 MMHG | OXYGEN SATURATION: 95 % | SYSTOLIC BLOOD PRESSURE: 137 MMHG | BODY MASS INDEX: 30.27 KG/M2

## 2020-01-27 DIAGNOSIS — J02.9 VIRAL PHARYNGITIS: Primary | ICD-10-CM

## 2020-01-27 LAB
FLUAV+FLUBV AG NOSE QL IA.RAPID: NEGATIVE POS/NEG
FLUAV+FLUBV AG NOSE QL IA.RAPID: POSITIVE POS/NEG
S PYO AG THROAT QL: NEGATIVE
VALID INTERNAL CONTROL?: YES
VALID INTERNAL CONTROL?: YES

## 2020-01-27 RX ORDER — FLUTICASONE PROPIONATE 50 MCG
2 SPRAY, SUSPENSION (ML) NASAL DAILY
Qty: 1 BOTTLE | Refills: 0 | Status: SHIPPED | OUTPATIENT
Start: 2020-01-27 | End: 2020-04-20

## 2020-01-27 NOTE — PROGRESS NOTES
Sabine Quiles is a 47 y.o. male    Chief Complaint   Patient presents with    Sore Throat    Fever       1. Have you been to the ER, urgent care clinic since your last visit? Hospitalized since your last visit? No  M  2. Have you seen or consulted any other health care providers outside of the [address] [place] [address] since your last visit? Include any pap smears or colon screening. No      Visit Vitals  Resp 18   Ht 5' 11\" (1.803 m)   BMI 30.54 kg/m²           Health Maintenance Due   Topic Date Due    Shingrix Vaccine Age 50> (1 of 2) 12/27/2015    FOBT Q 1 YEAR AGE 50-75  06/27/2017    Influenza Age 5 to Adult  08/01/2019         Medication Reconciliation completed, changes noted.   Please  Update medication list.

## 2020-01-27 NOTE — LETTER
NOTIFICATION RETURN TO WORK  
 
1/27/2020 10:20 AM 
 
Mr. Jamaal Christina [address] To Whom It May Concern: 
 
Jamaal Christina is currently under the care of [employer] [employer]. He will return to work on: 1/29/20 If there are questions or concerns please have the patient contact our office. Sincerely, Jose Rafael Petit MD

## 2020-01-27 NOTE — PROGRESS NOTES
Subjective:      Miriam Cox is a 47 y.o. male who presents for evaluation of possible respiratory infection. Pt states that symptoms started about 4 days ago with body ache, sore throat, cough and nasal congestion/rhinorrhea. Pt states that he also had a fever of 101.4F yesterday which resolved with tylenol. Taking Teraflu cough syrups. Denies any sinus pressure, nausea/vomiting, sneezing, wheezing or abd pain. Denies any dyspnea, blood in the sputum, tachypnea or tachycardia. Sick Contacts: unsure  Recent Travel: no  Recent Use of Antibiotics:no    SHx: Does not smoke     Pt has not received flu shot    Allergies- reviewed:   No Known Allergies    Medications- reviewed:   Current Outpatient Medications   Medication Sig    guaiFENesin (MUCINEX) 1,200 mg Ta12 ER tablet Take 1 Tab by mouth two (2) times a day for 30 days.  fluticasone propionate (FLONASE) 50 mcg/actuation nasal spray 2 Sprays by Both Nostrils route daily.  NIACIN PO Take  by mouth.  PARoxetine (PAXIL) 20 mg tablet TAKE 1 TABLET ORALLY ONCE DAILY    aspirin delayed-release 81 mg tablet Take  by mouth daily.  esomeprazole (NEXIUM) 20 mg capsule Take 40 mg by mouth every morning.  varicella-zoster recombinant, PF, (SHINGRIX, PF,) 50 mcg/0.5 mL susr injection Give 1 injection now and second in 2-6 months. No current facility-administered medications for this visit. Family History - reviewed:  Family History   Problem Relation Age of Onset    Heart Disease Mother     Heart Attack Mother         induced by gastric bleeding    COPD Father     Alcohol abuse Brother     Stroke Brother    [place] Arthritis-osteo Sister         Back surgery    Alcohol abuse Brother        Review of Systems    A comprehensive review of systems was negative except for that written in the History of Present Illness.        Objective:     Visit Vitals  /85 (BP 1 Location: Left arm, BP Patient Position: Sitting)   Pulse 77   Temp 99.7 °F (37.6 °C) (Oral)   Resp 18   Ht 5' 11\" (1.803 m)   Wt 216 lb 3.2 oz (98.1 kg)   SpO2 95%   BMI 30.15 kg/m²       General: Alert and oriented, in no acute distress. Well nourished. SKIN: No rash. No suspicious lesions or moles. HEAD: Normocephalic, atraumatic, PERRL, non-tender frontal sinuses, non-tender maxillary sinuses  EYE: PERRL. Sclera and conjuctival clear. Extraocular movements intact. EARS: External normal, canals clear, tympanic membranes normal.   NOSE: Mucosa healthy with mild drainage. OROPHARYNX: No suspicious lesions, normal tongue, dentition, pharynx non-erythematous. No tonsillar/pharyngeal exudate or edema . NECK: Supple; no masses; thyroid normal.  LYMPH NODES: No significant ant/posterior cervial lymphadenopathy. LUNGS: Respirations unlabored; clear to auscultation bilaterally. CARDIOVASCULAR: Regular, rate, and rhythm without murmurs, gallops or rubs. Assessment/Plan:       ICD-10-CM ICD-9-CM    1. Viral pharyngitis J02.9 462 AMB POC RAPID STREP A      AMB POC SUMANTH INFLUENZA A/B TEST      guaiFENesin (MUCINEX) 1,200 mg Ta12 ER tablet      fluticasone propionate (FLONASE) 50 mcg/actuation nasal spray     1. Viral pharyngitis  - Rapid strep negative  -Flu B positive  - guaiFENesin (MUCINEX) 1,200 mg Ta12 ER tablet; Take 1 Tab by mouth two (2) times a day for 30 days. Dispense: 60 Tab; Refill: 0  - fluticasone propionate (FLONASE) 50 mcg/actuation nasal spray; 2 Sprays by Both Nostrils route daily. Dispense: 1 Bottle; Refill: 0  -Discussed diagnosis and treatment of viral URIs and the importance of avoiding unnecessary antibiotic therapy. -Advised pt that the usual course and duration is up to 1.5 weeks and an additional 3 days on average in smokers. -RTC prn for review if worsening or non-resolving symptoms. I have discussed the diagnosis with the patient and the intended plan as seen in the above orders.   The patient has received an after-visit summary and questions were answered concerning future plans. I have discussed medication side effects and warnings with the patient as well.     Patient discussed with Dr. Liv Jennings (supervising provider)  Delores Bennett MD

## 2020-01-28 ENCOUNTER — TELEPHONE (OUTPATIENT)
Dept: FAMILY MEDICINE CLINIC | Age: 55
End: 2020-01-28

## 2020-01-28 NOTE — TELEPHONE ENCOUNTER
Patient recently diagnosed with the flu on 1.27.2020 and seen by Armavisa Setting is not feeling well and now needs that letter for work stating he will be out of work Thursday 1.30.2020    CWI#[id number]

## 2020-01-29 NOTE — TELEPHONE ENCOUNTER
Dr. Skip Fischer   Received: Today   Message Contents   Talisha Chakraborty [facility] Front Office             General Message/Vendor Calls     Caller's first and last name: Ceasar Dahl     Reason for call:back to work note     Callback required yes/no and why:yes     Best contact number(s):     Details to clarify the request: Ceasar Dahl the patient's girlfriend is requesting a back to work note faxed to her for January 30, 2020.  She will be at work until 4:30pm. Her fax number is [phone]

## 2020-01-29 NOTE — TELEPHONE ENCOUNTER
Patient's wife if calling looking for the status of this request as the patient will need this letter to return to work tomorrow

## 2020-04-17 DIAGNOSIS — J02.9 VIRAL PHARYNGITIS: ICD-10-CM

## 2020-04-20 RX ORDER — FLUTICASONE PROPIONATE 50 MCG
SPRAY, SUSPENSION (ML) NASAL
Qty: 1 BOTTLE | Refills: 0 | Status: SHIPPED | OUTPATIENT
Start: 2020-04-20 | End: 2021-08-20

## 2020-07-13 ENCOUNTER — TELEPHONE (OUTPATIENT)
Dept: FAMILY MEDICINE CLINIC | Age: 55
End: 2020-07-13

## 2020-07-13 NOTE — TELEPHONE ENCOUNTER
----- Message from Kimani Pepe sent at 7/13/2020 11:37 AM EDT -----  Regarding: Dr. Nisha English  General Message/Vendor Calls    Caller's first and last name: Caren Zapata pt's girlfriend      Reason for call: appt      Callback required yes/no and why: yes      Best contact number(s): [phone] or [phone]      Details to clarify the request: Pt is requesting an in pt appt with Dr. Emilee Liriano for Joint pain in the evening       Kimani Pepe

## 2020-07-31 ENCOUNTER — OFFICE VISIT (OUTPATIENT)
Dept: FAMILY MEDICINE CLINIC | Age: 55
End: 2020-07-31

## 2020-07-31 VITALS
HEART RATE: 68 BPM | HEIGHT: 71 IN | BODY MASS INDEX: 30.1 KG/M2 | TEMPERATURE: 98.6 F | SYSTOLIC BLOOD PRESSURE: 117 MMHG | OXYGEN SATURATION: 97 % | WEIGHT: 215 LBS | DIASTOLIC BLOOD PRESSURE: 76 MMHG | RESPIRATION RATE: 20 BRPM

## 2020-07-31 DIAGNOSIS — R06.89 BREATHING DIFFICULT: ICD-10-CM

## 2020-07-31 DIAGNOSIS — M79.642 LEFT HAND PAIN: ICD-10-CM

## 2020-07-31 DIAGNOSIS — M79.641 RIGHT HAND PAIN: Primary | ICD-10-CM

## 2020-07-31 NOTE — PROGRESS NOTES
Chief Complaint   Patient presents with    Joint Pain     joint pain bilateral hands x 5 years     Breathing Problem     breathing problem x 10 months, patient is a       1. Have you been to the ER, urgent care clinic since your last visit? Hospitalized since your last visit? No    2. Have you seen or consulted any other health care providers outside of the [address] [place] [address] since your last visit? Include any pap smears or colon screening. No     Reviewed record in preparation for visit and have obtained necessary documentation.

## 2020-07-31 NOTE — LETTER
NOTIFICATION RETURN TO WORK / SCHOOL 
 
7/31/2020 5:05 PM 
 
Mr. Juan Francisco Watt [address] To Whom It May Concern: 
 
Juan Francisco Watt is currently under the care of [employer] [employer]. Please allow Mr. Agustín Gerard to wear a 's Mask while at work. If there are questions or concerns please have the patient contact our office.  
 
 
 
Sincerely, 
 
 
Jose Leal MD

## 2020-08-01 NOTE — PROGRESS NOTES
[address]     Chief Complaint:   Chief Complaint   Patient presents with    Joint Pain     joint pain bilateral hands x 5 years     Breathing Problem     breathing problem x 10 months, patient is a         SUBJECTIVE:  Juan Francisco Watt is a 47 y.o. male who presents for:  Bilateral hand pain. No acute injury or trauma. 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 Currently working as a . Pain mostly at 1st and 2nd MCP joints but all fingers and wrist will hurt intermittently. No numbness or tingling. 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 He otherwise denies sob/rubi. No CP. Very remote h/o smoking cigarettes when he was 12 but quit and never smoked since. He was told that he has to be fitted for a mask to use under his 's face shield. Currently asymptomatic. No fever. No cough. No wheezing. PMHx:  Past Medical History:   Diagnosis Date    Anxiety     Arrhythmia     palpitations- saw cardiology 01/13/2017    Depression     -patient denies this 12/13/2017. states med is for Anxiety    Dyslipidemia     Esophagitis     GERD (gastroesophageal reflux disease)     barrets esophageus     Hemorrhoids, internal, with bleeding     Personal history of Zghk-Ektqz-Bfzfptp disease     Sleep apnea        Meds:   Current Outpatient Medications   Medication Sig Dispense Refill    fluticasone propionate (FLONASE) 50 mcg/actuation nasal spray SPRAY 2 SPRAYS INTO EACH NOSTRIL EVERY DAY 1 Bottle 0    NIACIN PO Take  by mouth.  PARoxetine (PAXIL) 20 mg tablet TAKE 1 TABLET ORALLY ONCE DAILY 90 Tab 1    aspirin delayed-release 81 mg tablet Take  by mouth daily.  esomeprazole (NEXIUM) 20 mg capsule Take 40 mg by mouth every morning.  varicella-zoster recombinant, PF, (SHINGRIX, PF,) 50 mcg/0.5 mL susr injection Give 1 injection now and second in 2-6 months.  0.5 mL 1       Allergies: No Known Allergies    Smoker:  Social History     Tobacco Use   Smoking Status Former Smoker    Packs/day: 0.25    Years: 8.00    Pack years: 2.00    Last attempt to quit: 1981    Years since quittin.6   Smokeless Tobacco Former User       ETOH:   Social History     Substance and Sexual Activity   Alcohol Use Yes    Alcohol/week: 5.0 standard drinks    Types: 5 Glasses of wine per week       FH:   Family History   Problem Relation Age of Onset    Heart Disease Mother     Heart Attack Mother         induced by gastric bleeding    COPD Father     Alcohol abuse Brother     Stroke Brother     Arthritis-osteo Sister         Back surgery    Alcohol abuse Brother        ROS:  Per HPI    Physical Exam:  Visit Vitals  /76 (BP 1 Location: Left arm, BP Patient Position: Sitting)   Pulse 68   Temp 98.6 °F (37 °C) (Temporal)   Resp 20   Ht 5' 11\" (1.803 m)   Wt 215 lb (97.5 kg)   SpO2 97%   BMI 29.99 kg/m²     GEN: No apparent distress. Alert and oriented and responds to all questions appropriately. EYES:  Conjunctiva clear;       LUNGS: Respirations unlabored; clear to auscultation bilaterally  CARDIOVASCULAR: Regular, rate, and rhythm without murmurs, gallops or rubs   NEUROLOGIC:  No focal neurologic deficits. MSK: Hands: FROM. Strength and sensation grossly intact. EXT: Well perfused. No edema. SKIN: No obvious rashes. Imaging: Radiographs of the right and left hand personally reviewed and demonstrates mild degenerative changes. No obvious fracture or dislocation. CXR personally reviewed and demonstrates no acute process. Assessment:    ICD-10-CM ICD-9-CM    1. Right hand pain  M79.641 729.5 XR HAND RT MIN 3 V   2. Left hand pain  M79.642 729.5 XR HAND LT MIN 3 V   3. Breathing difficult  R06.89 786.09 XR CHEST PA LAT       Plan:  Bilateral hand pain: Likely due to mild degenerative changes. Tylenol and NSAIDs PRN. He can try Tumeric and chondroitin sulfate as well.   He can also try voltaren gel. 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 CXR WNL. Letter provided for work to be able to wear 's Mask to help decrease exposure to smoke. Will refer to Pulmonology for PFTs. RTC: after pulm appointment.

## 2020-09-01 ENCOUNTER — VIRTUAL VISIT (OUTPATIENT)
Dept: SLEEP MEDICINE | Age: 55
End: 2020-09-01

## 2020-09-01 DIAGNOSIS — G47.33 OSA (OBSTRUCTIVE SLEEP APNEA): Primary | ICD-10-CM

## 2020-09-01 NOTE — PROGRESS NOTES
3:38 pm Called home number and left VM with instructions and office number to call to reschedule. 3:40 pm Called patient at mobile number, no answer. Left VM with instructions for Doxy and VV. Link sent to email on file, Email/Text release authorization form 8/6/2019 does not have text messaging checked as authorized. 4:00 PM Called patient at mobile number, no answer. Lexi Nichols is a 48 y.o. male who was to be seen for a positive airway pressure follow-up, last seen by me on 8/6/2019, previously seen by Dr. Moris Mays on 2/28/2017. Home sleep test 11/2014 showed AHI of 5.2/hr with a lowest SaO2 of 86%. APAP device set up 12/27/2016. Device data remote download shows no data since 5/3/20.  90 Day download shows 79% compliance with Average AHI of 2.4. AHI = 5.2(11/2014).  On APAP :  5-10 cmH2O.     Asha Agustin NP, [facility]  09/01/20

## 2020-09-15 ENCOUNTER — VIRTUAL VISIT (OUTPATIENT)
Dept: SLEEP MEDICINE | Age: 55
End: 2020-09-15
Payer: COMMERCIAL

## 2020-09-15 ENCOUNTER — DOCUMENTATION ONLY (OUTPATIENT)
Dept: SLEEP MEDICINE | Age: 55
End: 2020-09-15

## 2020-09-15 DIAGNOSIS — Z86.59 H/O: DEPRESSION: ICD-10-CM

## 2020-09-15 DIAGNOSIS — G47.33 OSA (OBSTRUCTIVE SLEEP APNEA): Primary | ICD-10-CM

## 2020-09-15 PROCEDURE — 99442 PR PHYS/QHP TELEPHONE EVALUATION 11-20 MIN: CPT | Performed by: NURSE PRACTITIONER

## 2020-09-15 NOTE — PATIENT INSTRUCTIONS
[address] [place]. [address] [facility], [address] Tel.  [phone] Fax. 100 [facility] [address] [place], [address] [facility] Tel.  [phone] Fax. [phone] [address] Tel.  [phone] Fax. [phone] Learning About CPAP for Sleep Apnea What is CPAP? CPAP is a small machine that you use at home every night while you sleep. It increases air pressure in your throat to keep your airway open. When you have sleep apnea, this can help you sleep better so you feel much better. CPAP stands for \"continuous positive airway pressure. \" The CPAP machine will have one of the following: · A mask that covers your nose and mouth · Prongs that fit into your nose · A mask that covers your nose only, the most common type. This type is called NCPAP. The N stands for \"nasal.\" Why is it done? CPAP is usually the best treatment for obstructive sleep apnea. It is the first treatment choice and the most widely used. Your doctor may suggest CPAP if you have: · Moderate to severe sleep apnea. · Sleep apnea and coronary artery disease (CAD) or heart failure. How does it help? · CPAP can help you have more normal sleep, so you feel less sleepy and more alert during the daytime. · CPAP may help keep heart failure or other heart problems from getting worse. · NCPAP may help lower your blood pressure. · If you use CPAP, your bed partner may also sleep better because you are not snoring or restless. What are the side effects? Some people who use CPAP have: · A dry or stuffy nose and a sore throat. · Irritated skin on the face. · Sore eyes. · Bloating. If you have any of these problems, work with your doctor to fix them. Here are some things you can try: · Be sure the mask or nasal prongs fit well. · See if your doctor can adjust the pressure of your CPAP. · If your nose is dry, try a humidifier. · If your nose is runny or stuffy, try decongestant medicine or a steroid nasal spray. If these things do not help, you might try a different type of machine. Some machines have air pressure that adjusts on its own. Others have air pressures that are different when you breathe in than when you breathe out. This may reduce discomfort caused by too much pressure in your nose. Where can you learn more? Go to Kids360.be Enter D000 in the search box to learn more about \"Learning About CPAP for Sleep Apnea. \"  
© 7197-8233 Healthwise, Incorporated. Care instructions adapted under license by [employer] (which disclaims liability or warranty for this information). This care instruction is for use with your licensed healthcare professional. If you have questions about a medical condition or this instruction, always ask your healthcare professional. [address] any warranty or liability for your use of this information. Content Version: 2.7.71936; Last Revised: January 11, 2010 PROPER SLEEP HYGIENE What to avoid · Do not have drinks with caffeine, such as coffee or black tea, for 8 hours before bed. · Do not smoke or use other types of tobacco near bedtime. Nicotine is a stimulant and can keep you awake. · Avoid drinking alcohol late in the evening, because it can cause you to wake in the middle of the night. · Do not eat a big meal close to bedtime. If you are hungry, eat a light snack. · Do not drink a lot of water close to bedtime, because the need to urinate may wake you up during the night. · Do not read or watch TV in bed. Use the bed only for sleeping and sexual activity. What to try · Go to bed at the same time every night, and wake up at the same time every morning. Do not take naps during the day. · Keep your bedroom quiet, dark, and cool. · Get regular exercise, but not within 3 to 4 hours of your bedtime. Brayan Rosa · Sleep on a comfortable pillow and mattress. · If watching the clock makes you anxious, turn it facing away from you so you cannot see the time. · If you worry when you lie down, start a worry book. Well before bedtime, write down your worries, and then set the book and your concerns aside. · Try meditation or other relaxation techniques before you go to bed. · If you cannot fall asleep, get up and go to another room until you feel sleepy. Do something relaxing. Repeat your bedtime routine before you go to bed again. · Make your house quiet and calm about an hour before bedtime. Turn down the lights, turn off the TV, log off the computer, and turn down the volume on music. This can help you relax after a busy day. Drowsy Driving: The [employer] cites drowsiness as a causing factor in more than 599,185 police reported crashes annually, resulting in 76,000 injuries and 1,500 deaths. Other surveys suggest 55% of people polled have driven while drowsy in the past year, 23% had fallen asleep but not crashed, 3% crashed, and 2% had and accident due to drowsy driving. Who is at risk? Young Drivers: One study of drowsy driving accidents states that 55% of the drivers were under 25 years. Of those, 75% were male. Shift Workers and Travelers: People who work overnight or travel across time zones frequently are at higher risk of experiencing Circadian Rhythm Disorders. They are trying to work and function when their body is programed to sleep. Sleep Deprived: Lack of sleep has a serious impact on your ability to pay attention or focus on a task. Consistently getting less than the average of 8 hours your body needs creates partial or cumulative sleep deprivation.   
Untreated Sleep Disorders: Sleep Apnea, Narcolepsy, R.L.S., and other sleep disorders (untreated) prevent a person from getting enough restful sleep. This leads to excessive daytime sleepiness and increases the risk for drowsy driving accidents by up to 7 times. Medications / Alcohol: Even over the counter medications can cause drowsiness. Medications that impair a drivers attention should have a warning label. Alcohol naturally makes you sleepy and on its own can cause accidents. Combined with excessive drowsiness its effects are amplified. Signs of Drowsy Driving: * You don't remember driving the last few miles * You may drift out of your daphney * You are unable to focus and your thoughts wander * You may yawn more often than normal 
 * You have difficulty keeping your eyes open / nodding off * Missing traffic signs, speeding, or tailgating Prevention-  
Good sleep hygiene, lifestyle and behavioral choices have the most impact on drowsy driving. There is no substitute for sleep and the average person requires 8 hours nightly. If you find yourself driving drowsy, stop and sleep. Consider the sleep hygiene tips provided during your visit as well. Medication Refill Policy: Refills for all medications require 1 week advance notice. Please have your pharmacy fax a refill request. We are unable to fax, or call in \"controled substance\" medications and you will need to pick these prescriptions up from our office. AesRx Activation Thank you for requesting access to AesRx. Please follow the instructions below to securely access and download your online medical record. AesRx allows you to send messages to your doctor, view your test results, renew your prescriptions, schedule appointments, and more. How Do I Sign Up? 1. In your internet browser, go to [email or web address]. 2. Click on the First Time User? Click Here link in the Sign In box. You will see the New Member Sign Up page. 3. Enter your AesRx Access Code exactly as it appears below.  You will not need to use this code after youve completed the sign-up process. If you do not sign up before the expiration date, you must request a new code. Vital Insight Access Code: Activation code not generated Current Vital Insight Status: Active (This is the date your Vital Insight access code will ) 4. Enter the last four digits of your Social Security Number (xxxx) and Date of Birth (mm/dd/yyyy) as indicated and click Submit. You will be taken to the next sign-up page. 5. Create a [employer] ID. This will be your Vital Insight login ID and cannot be changed, so think of one that is secure and easy to remember. 6. Create a Vital Insight password. You can change your password at any time. 7. Enter your Password Reset Question and Answer. This can be used at a later time if you forget your password. 8. Enter your e-mail address. You will receive e-mail notification when new information is available in [address]. 9. Click Sign Up. You can now view and download portions of your medical record. 10. Click the Download Summary menu link to download a portable copy of your medical information. Additional Information If you have questions, please call [phone]. Remember, Vital Insight is NOT to be used for urgent needs. For medical emergencies, dial 911.

## 2020-09-15 NOTE — PROGRESS NOTES
[address] [facility] [place]. [place], [address]   Tel.  [phone]   Fax. 100 [facility] 60   [place], [address] [facility]   Tel.  [phone]   Fax. [phone] [address] [place]   Tel.  [phone]   Fax. [phone]       Subjective:     Jodi Chowdary, who was evaluated through a patient-initiated, synchronous (real-time) audio only encounter, and/or her healthcare decision maker, is aware that it is a billable service, with coverage as determined by his insurance carrier. He provided verbal consent to proceed: Yes. He has not had a related appointment within my department in the past 7 days or scheduled within the next 24 hours. Total Time: minutes: 11-20 minutes    I was in the office while conducting this encounter. Patient verified with photo in chart. Video visit started but switched to audio only due to poor data connection. Jodi Chowdary is a 47 y.o. male, evaluated via audio-only technology on 9/15/2020 for  a positive airway pressure follow-up, last seen for a positive airway pressure follow-up, last seen by me on 8/6/2019, previously seen by Dr. Tello Sons 2/28/2017. Home sleep test 11/2014 showed AHI of 5.2/hr with a lowest SaO2 of 86%. APAP device set up 12/27/2016. He stopped using device 4 months ago when his insurance changed and he could not get supplies, he did not know he could purchase supplies directly online. He reports no problems using the device but does not have supplies. The following concerns reviewed:    Drowsiness no Problems exhaling no   Snoring no Forget to put on no   Mask Comfortable yes Can't fall asleep no   Dry Mouth no Mask falls off no   Air Leaking no Frequent awakenings no     He admits that his sleep has improved on PAP therapy using nasal pillows mask and heated tubing but he has not been using device due to lack of supplies.   He notes he does not sleep as well without device and will have a sore throat on awakening. Review of device download shows no data since 5/3/20.  90 Day download shows 79% compliance on APAP 5-10  Avg Peak pressure 9.8 cmH2O with Average AHI of 2.4/hr. [place] Sleepiness Score: 0 and Modified F.O.S.Q. Score Total / 2: 20 which reflects improved sleep quality over therapy time. No Known Allergies    He has a current medication list which includes the following prescription(s): niacin, paroxetine, aspirin delayed-release, esomeprazole, fluticasone propionate, and varicella-zoster recombinant (pf). .      He  has a past medical history of Anxiety, Arrhythmia, Depression, Dyslipidemia, Esophagitis, GERD (gastroesophageal reflux disease), Hemorrhoids, internal, with bleeding, Personal history of Vvnl-Vkvmy-Ajsubwd disease, and Sleep apnea. Sleep Review of Systems: notable for Negative difficulty falling asleep; Positive awakenings at night; Negative early morning headaches; Negative memory problems; Negative concentration issues; Negative chest pain; Negative shortness of breath; Positive significant joint pain at night; Negative significant muscle pain at night; Negative rashes or itching; Negative heartburn; Negative significant mood issues; 0 afternoon naps per week    Objective:   Vitals reported by patient   Patient-Reported Vitals 9/15/2020   Patient-Reported Weight 215 lb        Calculated BMI 29    Physical Exam not completed due to audio only visit. Assessment:       ICD-10-CM ICD-9-CM    1. MARTIN (obstructive sleep apnea)  G47.33 327.23 AMB SUPPLY ORDER   2. H/O: depression  Z86.59 V11.8    3. Adult BMI 29.0-29.9 kg/sq m  Z68.29 V85.25        AHI = 5.2(11/2014).  On APAP :  5-10 cmH2O. He is not compliant with PAP therapy although when used PAP shows benefit to the patient and remains necessary for control of his sleep apnea. There is continued clinical benefit from the hours of use demonstrated by AHI reduced from 5.2/hr to 2.4/hr.   He will get new supplies and restart use of device. Plan:     Follow-up and Dispositions    · Return in about 1 year (around 9/15/2021). *  Continue on current pressures    * Supplies ordered - nasal pillows mask and heated tubing. He has [employer] and is not sure who accepts his insurance but doesn't have a preference for DME - please call him to let him know who we send order to so he can follow up. Orders Placed This Encounter    AMB SUPPLY ORDER     Diagnosis: (G47.33) MARTIN (obstructive sleep apnea)  (primary encounter diagnosis)     Replacement Supplies for Positive Airway Pressure Therapy Device:   Duration of need: 99 months.  Nasal Pillows (Replace) 2 per month.  Nasal Interface Mask 1 every 3 months.  Pos Airway pressure chin strap   Headgear 1 every 6 months.  Tubing with heating element 1 every 3 months.  Filter(s) Disposable 2 per month.  Filter(s) Non-Disposable 1 every 6 months. .   [address] [facility] for Humidifier (Replace) 1 every 6 months. CJ HuffmanBC; NPI: [id number]    Electronically signed. Date:- 09/15/20       * Counseling was provided regarding the importance of regular PAP use with emphasis on ensuring sufficient total sleep time, proper sleep hygiene, and safe driving. * Re-enforced proper and regular cleaning for the device. * He was asked to contact our office for any problems regarding PAP therapy. 2. H/O Depression - continue on his current regimen. 3. Encouraged continued weight management program through appropriate diet and exercise regimen as significant weight reduction has been shown to reduce severity of obstructive sleep apnea. Patient's phone number [phone] (home) [phone] (work) was reviewed and confirmed for accuracy. He gives permission for messages regarding results and appointments to be left at that number.     Pursuant to the emergency declaration under the [employer] and the [employer] Act, [facility] waiver authority and the Coronavirus Preparedness and [employer] Act, this telephone encounter was conducted with patient's (and/or legal guardian's) consent, to reduce the risk of exposure to COVID-19 and provide necessary medical care. Services were provided through a telephone call to substitute for in-person encounter. ADRIANO Suero, [address] [place] [address]  Electronically signed.  09/15/20

## 2020-09-21 ENCOUNTER — DOCUMENTATION ONLY (OUTPATIENT)
Dept: SLEEP MEDICINE | Age: 55
End: 2020-09-21

## 2020-09-21 NOTE — PROGRESS NOTES
Fax[facility] Supply [facility], order was sent to [facility] and [facility] does not accept ABC

## 2020-10-27 DIAGNOSIS — F41.9 ANXIETY: ICD-10-CM

## 2020-10-29 RX ORDER — PAROXETINE HYDROCHLORIDE 20 MG/1
TABLET, FILM COATED ORAL
Qty: 90 TAB | Refills: 1 | Status: SHIPPED | OUTPATIENT
Start: 2020-10-29 | End: 2021-04-26

## 2020-12-29 ENCOUNTER — DOCUMENTATION ONLY (OUTPATIENT)
Dept: SLEEP MEDICINE | Age: 55
End: 2020-12-29

## 2021-03-26 ENCOUNTER — OFFICE VISIT (OUTPATIENT)
Dept: FAMILY MEDICINE CLINIC | Age: 56
End: 2021-03-26
Payer: COMMERCIAL

## 2021-03-26 VITALS
BODY MASS INDEX: 30.04 KG/M2 | TEMPERATURE: 98 F | HEIGHT: 71 IN | DIASTOLIC BLOOD PRESSURE: 87 MMHG | RESPIRATION RATE: 20 BRPM | SYSTOLIC BLOOD PRESSURE: 136 MMHG | HEART RATE: 64 BPM | WEIGHT: 214.6 LBS | OXYGEN SATURATION: 96 %

## 2021-03-26 DIAGNOSIS — M79.642 BILATERAL HAND PAIN: ICD-10-CM

## 2021-03-26 DIAGNOSIS — E78.00 ELEVATED CHOLESTEROL: ICD-10-CM

## 2021-03-26 DIAGNOSIS — M79.641 BILATERAL HAND PAIN: ICD-10-CM

## 2021-03-26 DIAGNOSIS — M25.50 POLYARTHRALGIA: Primary | ICD-10-CM

## 2021-03-26 PROCEDURE — 99213 OFFICE O/P EST LOW 20 MIN: CPT | Performed by: FAMILY MEDICINE

## 2021-03-26 NOTE — PROGRESS NOTES
Chief Complaint   Patient presents with    Hand Pain     Patient presents complaining of pain in hands since forever; not from injury; works with hands. Pain 4/10 today. Also really fatigued x forever as well even on the C-Pap. Visit Vitals  /87 (BP 1 Location: Left upper arm, BP Patient Position: Sitting, BP Cuff Size: Large adult)   Pulse 64   Temp 98 °F (36.7 °C) (Temporal)   Resp 20   Ht 5' 11\" (1.803 m)   Wt 214 lb 9.6 oz (97.3 kg)   SpO2 96%   BMI 29.93 kg/m²        1. Have you been to the ER, urgent care clinic since your last visit? Hospitalized since your last visit? No     2. Have you seen or consulted any other health care providers outside of the [address] [place] [address] since your last visit? Include any pap smears or colon screening.  No

## 2021-03-26 NOTE — PROGRESS NOTES
[address]     Chief Complaint:   Chief Complaint   Patient presents with    Hand Pain     Patient presents complaining of pain in hands since forever; not from injury; works with hands. Pain 4/10 today. Also really fatigued x forever as well even on the C-Pap. SUBJECTIVE:  Pura Morgan is a 54 y.o. male who presents for bilateral hand pain. Pain is chronic and has been progressively worsening. No injury or trauma. 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  Pain mostly in the 1st and second MTP joints and [facility]st [facility]. No numbness or tingling. H/o bilateral carpal tunnel release but he reports current sx do not feel like his past sx with CTS. He takes [facility] Powder PRN for sx with some relief. PMHx:  Past Medical History:   Diagnosis Date    Anxiety     Arrhythmia     palpitations- saw cardiology 01/13/2017    Depression     -patient denies this 12/13/2017. states med is for Anxiety    Dyslipidemia     Esophagitis     GERD (gastroesophageal reflux disease)     barrets esophageus     Hemorrhoids, internal, with bleeding     Personal history of Bbkt-Sswml-Jvjoabp disease     Sleep apnea        Meds:   Current Outpatient Medications   Medication Sig Dispense Refill    PARoxetine (PAXIL) 20 mg tablet TAKE 1 TABLET ORALLY ONCE DAILY 90 Tab 1    aspirin delayed-release 81 mg tablet Take  by mouth daily.  esomeprazole (NEXIUM) 20 mg capsule Take 40 mg by mouth every morning.  fluticasone propionate (FLONASE) 50 mcg/actuation nasal spray SPRAY 2 SPRAYS INTO EACH NOSTRIL EVERY DAY 1 Bottle 0    NIACIN PO Take  by mouth.  varicella-zoster recombinant, PF, (SHINGRIX, PF,) 50 mcg/0.5 mL susr injection Give 1 injection now and second in 2-6 months.  0.5 mL 1       Allergies:   No Known Allergies    Smoker:  Social History     Tobacco Use   Smoking Status Former Smoker    Packs/day: 0.25    Years: 8.00    Pack years: 2.00    Quit date: 0    Years since quittin.2   Smokeless Tobacco Former User       ETOH:   Social History     Substance and Sexual Activity   Alcohol Use Yes    Alcohol/week: 5.0 standard drinks    Types: 5 Glasses of wine per week       FH:   Family History   Problem Relation Age of Onset    Heart Disease Mother     Heart Attack Mother         induced by gastric bleeding    COPD Father     Alcohol abuse Brother     Stroke Brother    Ryanne Curl Arthritis-osteo Sister         Back surgery    Alcohol abuse Brother        ROS:  Per HPI    Physical Exam:  Visit Vitals  /87 (BP 1 Location: Left upper arm, BP Patient Position: Sitting, BP Cuff Size: Large adult)   Pulse 64   Temp 98 °F (36.7 °C) (Temporal)   Resp 20   Ht 5' 11\" (1.803 m)   Wt 214 lb 9.6 oz (97.3 kg)   SpO2 96%   BMI 29.93 kg/m²     GEN: No apparent distress. Alert and oriented and responds to all questions appropriately. EYES:  Conjunctiva clear; pupils round and reactive to light; extraocular movements are intact. EAR: External ears are normal.           LUNGS: Respirations unlabored; clear to auscultation bilaterally  CARDIOVASCULAR: Regular, rate, and rhythm without murmurs, gallops or rubs   NEUROLOGIC:  No focal neurologic deficits. MSK: No obvious deformity of the wrist, hand and fingers. Tenderness along the 1st and second MTP and 1st CMC joint. EPL, pincer grasp, and intrinsics intact. Wrist ROM and strength intact. EXT: Well perfused. No edema. SKIN: No obvious rashes. Imaging: Radiographs of the right and left hands on 2020 demonstrated no obvious fracture or dislocation. No significant degenerative changes.      Assessment:    ICD-10-CM ICD-9-CM    1. Polyarthralgia  T64.78 869.14 METABOLIC PANEL, COMPREHENSIVE      CBC W/O DIFF      C REACTIVE PROTEIN, QT      SED RATE (ESR)      RHEUMATOID FACTOR, QT      LUPUS ANTICOAGULANT PANEL      LIPID PANEL      METABOLIC PANEL, COMPREHENSIVE      CBC W/O DIFF      C REACTIVE PROTEIN, QT      SED RATE (ESR)      LUPUS ANTICOAGULANT PANEL      LIPID PANEL   2. Bilateral hand pain  Y63.288 334.8 METABOLIC PANEL, COMPREHENSIVE    M79.642  CBC W/O DIFF      C REACTIVE PROTEIN, QT      SED RATE (ESR)      RHEUMATOID FACTOR, QT      LUPUS ANTICOAGULANT PANEL      LIPID PANEL      METABOLIC PANEL, COMPREHENSIVE      CBC W/O DIFF      C REACTIVE PROTEIN, QT      SED RATE (ESR)      LUPUS ANTICOAGULANT PANEL      LIPID PANEL   3. Elevated cholesterol  A42.48 486.8 METABOLIC PANEL, COMPREHENSIVE      CBC W/O DIFF      C REACTIVE PROTEIN, QT      SED RATE (ESR)      RHEUMATOID FACTOR, QT      LUPUS ANTICOAGULANT PANEL      LIPID PANEL      METABOLIC PANEL, COMPREHENSIVE      CBC W/O DIFF      C REACTIVE PROTEIN, QT      SED RATE (ESR)      LUPUS ANTICOAGULANT PANEL      LIPID PANEL       Plan:  Polyarthralgia/Bilateral hand pain: Likely due to early DJD vs tendonopathy from long term physical work with hands. RA also on differential.  XR in the past have been WNL. Will get labs as above. Voltaren Gel PRN. Elevated cholesterol in the past: will check lipid panel today. RTC: TBD after labs reviewed.

## 2021-03-30 ENCOUNTER — TELEPHONE (OUTPATIENT)
Dept: FAMILY MEDICINE CLINIC | Age: 56
End: 2021-03-30

## 2021-03-30 LAB
ALBUMIN SERPL-MCNC: 4.3 G/DL (ref 3.8–4.9)
ALBUMIN/GLOB SERPL: 1.8 {RATIO} (ref 1.2–2.2)
ALP SERPL-CCNC: 80 IU/L (ref 39–117)
ALT SERPL-CCNC: 21 IU/L (ref 0–44)
APTT HEX PL PPP: NORMAL S
APTT IMM NP PPP: NORMAL S
APTT PPP 1:1 SALINE: NORMAL S
APTT PPP: NORMAL S
AST SERPL-CCNC: 20 IU/L (ref 0–40)
B2 GLYCOPROT1 IGA SER-ACNC: NORMAL
B2 GLYCOPROT1 IGG SER-ACNC: NORMAL
B2 GLYCOPROT1 IGM SER-ACNC: NORMAL
BILIRUB SERPL-MCNC: 0.4 MG/DL (ref 0–1.2)
BUN SERPL-MCNC: 15 MG/DL (ref 6–24)
BUN/CREAT SERPL: 14 (ref 9–20)
CALCIUM SERPL-MCNC: 9.1 MG/DL (ref 8.7–10.2)
CARDIOLIPIN IGG SER IA-ACNC: NORMAL
CARDIOLIPIN IGM SER IA-ACNC: NORMAL
CHLORIDE SERPL-SCNC: 102 MMOL/L (ref 96–106)
CHOLEST SERPL-MCNC: 223 MG/DL (ref 100–199)
CO2 SERPL-SCNC: 29 MMOL/L (ref 20–29)
CONFIRM DRVVT: NORMAL S
CREAT SERPL-MCNC: 1.06 MG/DL (ref 0.76–1.27)
CRP SERPL-MCNC: 2 MG/L (ref 0–10)
ERYTHROCYTE [DISTWIDTH] IN BLOOD BY AUTOMATED COUNT: 12.2 % (ref 11.6–15.4)
ERYTHROCYTE [SEDIMENTATION RATE] IN BLOOD BY WESTERGREN METHOD: 7 MM/HR (ref 0–30)
GLOBULIN SER CALC-MCNC: 2.4 G/DL (ref 1.5–4.5)
GLUCOSE SERPL-MCNC: 88 MG/DL (ref 65–99)
HCT VFR BLD AUTO: 42.1 % (ref 37.5–51)
HDLC SERPL-MCNC: 51 MG/DL
HGB BLD-MCNC: 14.7 G/DL (ref 13–17.7)
IMP & REVIEW OF LAB RESULTS: NORMAL
LDLC SERPL CALC-MCNC: 144 MG/DL (ref 0–99)
MCH RBC QN AUTO: 33 PG (ref 26.6–33)
MCHC RBC AUTO-ENTMCNC: 34.9 G/DL (ref 31.5–35.7)
MCV RBC AUTO: 94 FL (ref 79–97)
PLATELET # BLD AUTO: 231 X10E3/UL (ref 150–450)
PLATELET NEUTRALIZATION 500049: NORMAL
POTASSIUM SERPL-SCNC: 4.3 MMOL/L (ref 3.5–5.2)
PROT SERPL-MCNC: 6.7 G/DL (ref 6–8.5)
PROTHROMBIN TIME: NORMAL SEC
RBC # BLD AUTO: 4.46 X10E6/UL (ref 4.14–5.8)
SCREEN DRVVT: NORMAL S
SODIUM SERPL-SCNC: 141 MMOL/L (ref 134–144)
THROMBIN TIME: NORMAL S
TRIGL SERPL-MCNC: 157 MG/DL (ref 0–149)
VLDLC SERPL CALC-MCNC: 28 MG/DL (ref 5–40)
WBC # BLD AUTO: 4.9 X10E3/UL (ref 3.4–10.8)

## 2021-03-30 RX ORDER — ATORVASTATIN CALCIUM 20 MG/1
20 TABLET, FILM COATED ORAL DAILY
Qty: 90 TAB | Refills: 3 | Status: SHIPPED | OUTPATIENT
Start: 2021-03-30 | End: 2021-08-20

## 2021-03-30 NOTE — TELEPHONE ENCOUNTER
Pt notified and states he stopped Niacin a few weeks ago .        ----- Message from Richard Carcamo MD sent at 3/30/2021 10:48 AM EDT -----  Please call patient and tell him: Labs reviewed. Labs WNL except elevated cholesterol. Will start lipitor 20mg daily. Niacin listed as historical medication and will tell patient to stop taking Niacin while taking lipitor. Recheck labs in 6 months.

## 2021-03-30 NOTE — PROGRESS NOTES
Labs reviewed. Labs WNL excepted elevated cholesterol. Will start lipitor 20mg daily. Niacin listed as historical medication and will tell patient to stop taking Niacin while taking lipitor. Recheck labs in 6 months.

## 2021-04-17 LAB
APTT HEX PL PPP: 0 SEC
APTT IMM NP PPP: NORMAL SEC
APTT PPP 1:1 SALINE: NORMAL SEC
APTT PPP: 28.6 SEC
B2 GLYCOPROT1 IGA SER-ACNC: <10 SAU
B2 GLYCOPROT1 IGG SER-ACNC: <10 SGU
B2 GLYCOPROT1 IGM SER-ACNC: <10 SMU
CARDIOLIPIN IGG SER IA-ACNC: <10 GPL
CARDIOLIPIN IGM SER IA-ACNC: <10 MPL
CONFIRM DRVVT: NORMAL SEC
DRVVT SCREEN TO CONFIRM RATIO: NORMAL RATIO
IMP & REVIEW OF LAB RESULTS: NORMAL
INR PPP: 1 RATIO
LAC INTERPRETATION, 502038: NORMAL
PLATELET NEUTRALIZATION 500049: 0 SEC
PROTHROMBIN TIME: 10.7 SEC
SCREEN DRVVT: 43.9 SEC
THROMBIN TIME: 19.4 SEC

## 2021-04-25 DIAGNOSIS — F41.9 ANXIETY: ICD-10-CM

## 2021-04-25 NOTE — TELEPHONE ENCOUNTER
Burning red rash down the left side of the ear and neck. Has taken benadryl and tylenol with some resolution. No difficulty breathing. No h/o anaphylaxis.  Will send message for early clinic apt tomorrow am.

## 2021-04-26 ENCOUNTER — TELEPHONE (OUTPATIENT)
Dept: FAMILY MEDICINE CLINIC | Age: 56
End: 2021-04-26

## 2021-04-26 RX ORDER — PAROXETINE HYDROCHLORIDE 20 MG/1
TABLET, FILM COATED ORAL
Qty: 90 TAB | Refills: 1 | Status: SHIPPED | OUTPATIENT
Start: 2021-04-26 | End: 2022-10-20 | Stop reason: SDUPTHER

## 2021-04-26 NOTE — TELEPHONE ENCOUNTER
Called x3 to schedule no answer LVM . Shira Clemons MD  P [facility] [address] [facility]             Please call to make acute care apt for this patient tomorrow first thing in the morning. Red painful swollen rash down side of face.

## 2021-04-26 NOTE — TELEPHONE ENCOUNTER
----- Message from Sánchez Giles MD sent at 4/25/2021  5:52 PM EDT -----  Please call to make acute care apt for this patient tomorrow first thing in the morning. Red painful swollen rash down side of face.

## 2021-08-20 ENCOUNTER — OFFICE VISIT (OUTPATIENT)
Dept: CARDIOLOGY CLINIC | Age: 56
End: 2021-08-20
Payer: COMMERCIAL

## 2021-08-20 VITALS
HEART RATE: 73 BPM | OXYGEN SATURATION: 97 % | BODY MASS INDEX: 29.54 KG/M2 | DIASTOLIC BLOOD PRESSURE: 96 MMHG | WEIGHT: 211 LBS | HEIGHT: 71 IN | SYSTOLIC BLOOD PRESSURE: 138 MMHG

## 2021-08-20 DIAGNOSIS — G47.30 ORGANIC SLEEP APNEA: ICD-10-CM

## 2021-08-20 DIAGNOSIS — E78.5 DYSLIPIDEMIA: Primary | ICD-10-CM

## 2021-08-20 PROCEDURE — 99204 OFFICE O/P NEW MOD 45 MIN: CPT | Performed by: SPECIALIST

## 2021-08-20 PROCEDURE — 93000 ELECTROCARDIOGRAM COMPLETE: CPT | Performed by: SPECIALIST

## 2021-08-20 RX ORDER — ROSUVASTATIN CALCIUM 10 MG/1
20 TABLET, COATED ORAL DAILY
Qty: 30 TABLET | Refills: 5 | Status: SHIPPED | OUTPATIENT
Start: 2021-08-20 | End: 2021-08-26 | Stop reason: DRUGHIGH

## 2021-08-20 NOTE — PATIENT INSTRUCTIONS
1) stop your Lipitor also known as atorvastatin    2) begin Crestor also known as rosuvastatin at 20 mg a day    3) please get your cholesterol and liver enzymes checked at [facility] fasting in 2 months    4) secondary to strong family history of heart disease I think we should do a stress test and will do a stress echo    5) consider getting calcium scoring and will give you information regarding this    6) we will follow up with you in 3 months

## 2021-08-20 NOTE — PROGRESS NOTES
CARDIOLOGY OFFICE NOTE    Yordy Gong MD, [address], [place], [address] [facility]  Phone [phone]; Fax [phone]  Mobile [phone]   Voice Mail [phone]    Primary care: Serafin Lutz MD       ATTENTION:   This medical record was transcribed using an electronic medical records/speech recognition system. Although proofread, it may and can contain electronic, spelling and other errors. Corrections may be executed at a later time. Please feel free to contact us for any clarifications as needed. ICD-10-CM ICD-9-CM   1. Flutter-fibrillation [facility])  I48.91 427.31    I48.92 427.32            Chante Lynn is a 54 y.o. male with  referred for dyslipidemia and fatigue          Cardiac risk factors: family history, dyslipidemia, male gender, hypertension  I have personally obtained the history from the patient. HISTORY OF PRESENTING ILLNESS      Comes to the office today secondary increasing fatigue and elevated cholesterol profile in the setting of a strong family history of heart disease with both brothers dying in the 46s from heart attacks. He is not having any chest discomfort but just having a lot more fatigue and inability to do the things he usually does.   PND orthopnea         ACTIVE PROBLEM LIST     Patient Active Problem List    Diagnosis Date Noted    Primary osteoarthritis of left hip 12/26/2017    Personal history of Gkuq-Synjg-Kyfukas disease     Bleeding internal hemorrhoids 08/11/2017    H/O colonoscopy 06/27/2016    History of depression 01/22/2015    Overweight(278.02) 11/28/2014    Organic sleep apnea 11/28/2014    GERD (gastroesophageal reflux disease) 06/19/2014    Hadley's esophagus 06/19/2014           PAST MEDICAL HISTORY     Past Medical History:   Diagnosis Date    Anxiety     Arrhythmia     palpitations- saw cardiology 01/13/2017    Depression     -patient denies this 12/13/2017. states med is for Anxiety    Dyslipidemia     Esophagitis     GERD (gastroesophageal reflux disease)     barrets esophageus     Hemorrhoids, internal, with bleeding     Personal history of Qxfd-Ztwrj-Ypxewyh disease     Sleep apnea            PAST SURGICAL HISTORY     Past Surgical History:   Procedure Laterality Date    HX CARPAL TUNNEL RELEASE Bilateral     HX COLONOSCOPY      HX ENDOSCOPY  2014    HX TONSILLECTOMY            ALLERGIES     No Known Allergies       FAMILY HISTORY     Family History   Problem Relation Age of Onset    Heart Disease Mother     Heart Attack Mother         induced by gastric bleeding    COPD Father     Alcohol abuse Brother     Stroke Brother     Arthritis-osteo Sister         Back surgery    Alcohol abuse Brother     negative for cardiac disease       SOCIAL HISTORY     Social History     Socioeconomic History    Marital status: SINGLE     Spouse name: Not on file    Number of children: Not on file    Years of education: Not on file    Highest education level: Not on file   Tobacco Use    Smoking status: Former Smoker     Packs/day: 0.25     Years: 8.00     Pack years: 2.00     Quit date:      Years since quittin.6    Smokeless tobacco: Former User   Substance and Sexual Activity    Alcohol use: Yes     Alcohol/week: 5.0 standard drinks     Types: 5 Glasses of wine per week    Drug use: Not Currently     Types: Marijuana     Comment: Every once in awhile    Sexual activity: Yes     Social Determinants of Health     Financial Resource Strain:     Difficulty of Paying Living Expenses:    Food Insecurity:     Worried About Running Out of Food in the Last Year:     Ran Out of Food in the Last Year:    Transportation Needs:     Lack of Transportation (Medical):      Lack of Transportation (Non-Medical):    Physical Activity:     Days of Exercise per Week:     Minutes of Exercise per Session:    Stress:     Feeling of Stress :    Social Connections:     Frequency of Communication with Friends and Family:     Frequency of Social Gatherings with Friends and Family:     Attends [employer] Services:     Active Member of Clubs or Organizations:     Attends Club or Organization Meetings:     Marital Status:          MEDICATIONS     Current Outpatient Medications   Medication Sig    PARoxetine (PAXIL) 20 mg tablet TAKE 1 TABLET ORALLY ONCE DAILY    atorvastatin (LIPITOR) 20 mg tablet Take 1 Tab by mouth daily.  aspirin delayed-release 81 mg tablet Take  by mouth daily.  esomeprazole (NEXIUM) 20 mg capsule Take 40 mg by mouth every morning.  fluticasone propionate (FLONASE) 50 mcg/actuation nasal spray SPRAY 2 SPRAYS INTO EACH NOSTRIL EVERY DAY (Patient not taking: Reported on 8/20/2021)    varicella-zoster recombinant, PF, (SHINGRIX, PF,) 50 mcg/0.5 mL susr injection Give 1 injection now and second in 2-6 months. (Patient not taking: Reported on 8/20/2021)     No current facility-administered medications for this visit. I have reviewed the nurses notes, vitals, problem list, allergy list, medical history, family, social history and medications. REVIEW OF SYMPTOMS   Pertinent positive per HPI  General: Pt denies excessive weight gain or loss. Pt is able to conduct ADL's  HEENT: Denies blurred vision, headaches, hearing loss, epistaxis and difficulty swallowing. Respiratory: Denies cough, congestion, shortness of breath, MIRAMONTES, wheezing or stridor. Cardiovascular: Denies precordial pain, palpitations, edema or PND  Gastrointestinal: Denies poor appetite, indigestion, abdominal pain or blood in stool  Genitourinary: Denies hematuria, dysuria, increased urinary frequency  Musculoskeletal: Denies joint pain or swelling from muscles or joints  Neurologic: Denies tremor, paresthesias, headache, or sensory motor disturbance  Psychiatric: Denies confusion, insomnia, depression  Integumentray: Denies rash, itching or ulcers.   Hematologic: Denies easy bruising, bleeding     PHYSICAL EXAMINATION      Vitals:    08/20/21 1550   BP: (!) 138/96   Pulse: 73   SpO2: 97%   Weight: 211 lb (95.7 kg)   Height: 5' 11\" (1.803 m)     General: Well developed, in no acute distress. HEENT: No jaundice, oral mucosa moist, no oral ulcers  Neck: Supple, no stiffness, no lymphadenopathy, supple  Heart:  Normal S1/S2 negative S3 or S4. Regular, no murmur, gallop or rub, no jugular venous distention  Respiratory: Clear bilaterally x 4, no wheezing or rales  Abdomen:   Soft, non-tender, bowel sounds are active. Extremities:  No edema, normal cap refill, no cyanosis. Musculoskeletal: No clubbing, no deformities  Neuro: A&Ox3, speech clear, gait stable, cooperative, no focal neurologic deficits  Skin: Skin color is normal. No rashes or lesions. Non diaphoretic, moist.  Vascular: 2+ pulses symmetric in all extremities        EKG: Normal sinus rhythm incomplete right bundle branch block     DIAGNOSTIC DATA     1. Lipids   3/8/16- , HDL 53, ,    3/26/21- , HDL 51, ,      2. Echo   1/24/17- EF 55-60%     3. Stress Test   5/22/19-Treadmill-normal, mets 11, 9:23 min    4.  Holter  1/13/17- SR, HR , occasional PAC/PVC's         LABORATORY DATA            Lab Results   Component Value Date/Time    WBC 4.9 03/26/2021 12:00 AM    HGB 14.7 03/26/2021 12:00 AM    HCT 42.1 03/26/2021 12:00 AM    PLATELET 935 28/47/6114 12:00 AM    MCV 94 03/26/2021 12:00 AM      Lab Results   Component Value Date/Time    Sodium 141 03/26/2021 12:00 AM    Potassium 4.3 03/26/2021 12:00 AM    Chloride 102 03/26/2021 12:00 AM    CO2 29 03/26/2021 12:00 AM    Anion gap 6 12/28/2017 02:45 AM    Glucose 88 03/26/2021 12:00 AM    BUN 15 03/26/2021 12:00 AM    Creatinine 1.06 03/26/2021 12:00 AM    BUN/Creatinine ratio 14 03/26/2021 12:00 AM    GFR est AA 91 03/26/2021 12:00 AM    GFR est non-AA 79 03/26/2021 12:00 AM    Calcium 9.1 03/26/2021 12:00 AM    Bilirubin, total 0.4 03/26/2021 12:00 AM    Alk. phosphatase 80 03/26/2021 12:00 AM    Protein, total 6.7 03/26/2021 12:00 AM    Albumin 4.3 03/26/2021 12:00 AM    Globulin 2.8 12/13/2017 02:52 PM    A-G Ratio 1.8 03/26/2021 12:00 AM    ALT (SGPT) 21 03/26/2021 12:00 AM           ASSESSMENT/RECOMMENDATIONS:.      1.  Shortness of breath  -Does state that at his job he is required to be outside in the heat where long clothing and last year he was able to do this activity and may be is slightly more limited in his ability to do that with more shortness of breath.  -Because of his strong family history of heart disease I think we should go ahead and do a exercise echo  -Also feel like we should check calcium scoring. Last time he had it done was in 2016 and it was 14  2. MARTIN  3. Dyslipidemia  -Cholesterol significantly elevated as strong family history of heart disease so I am stopping his Lipitor and placed him on Crestor 20 mg a day  -Obtain a repeat cholesterol profile in 2 months  -Encourage diet low in red meat      With me in 3 months    Orders Placed This Encounter    AMB POC EKG ROUTINE W/ 12 LEADS, INTER & REP     Order Specific Question:   Reason for Exam:     Answer:   FATIGUE       We discussed the expected course, resolution and complications of the diagnosis(es) in detail. Medication risks, benefits, costs, interactions, and alternatives were discussed as indicated. I advised him to contact the office if his condition worsens, changes or fails to improve as anticipated. He expressed understanding with the diagnosis(es) and plan              I have discussed the diagnosis with  Zoran Casiano and the intended plan as seen in the above orders. Questions were answered concerning future plans. I have discussed medication side effects and warnings with the patient as well. Thank you,  Zora Cooper MD for involving me in the care of  Zoran Casiano. Please do not hesitate to contact me for further questions/concerns. Yordy Soler MD, [address] [place] [address] Rd[address]      [facility], [address] [place] [address]     She [address]      [phone] / [phone] Fax

## 2021-08-20 NOTE — PROGRESS NOTES
Diogenes Mccormack is a 54 y.o. male    Visit Vitals  BP (!) 138/96 (BP 1 Location: Left upper arm, BP Patient Position: Sitting, BP Cuff Size: Adult)   Pulse 73   Ht 5' 11\" (1.803 m)   Wt 211 lb (95.7 kg)   SpO2 97%   BMI 29.43 kg/m²       Chief Complaint   Patient presents with    Irregular Heart Beat     FLUTTER    Fatigue       Chest pain NO  SOB NO  Dizziness NO  Swelling NO  Recent hospital visit NO  Refills NO  COVID VACCINE STATUS NO

## 2021-08-26 RX ORDER — ROSUVASTATIN CALCIUM 20 MG/1
20 TABLET, COATED ORAL
Qty: 30 TABLET | Refills: 5 | Status: SHIPPED | OUTPATIENT
Start: 2021-08-26 | End: 2022-02-22

## 2021-08-26 RX ORDER — ROSUVASTATIN CALCIUM 20 MG/1
20 TABLET, COATED ORAL
COMMUNITY
End: 2021-08-26 | Stop reason: SDUPTHER

## 2021-09-30 ENCOUNTER — ANCILLARY PROCEDURE (OUTPATIENT)
Dept: CARDIOLOGY CLINIC | Age: 56
End: 2021-09-30
Payer: COMMERCIAL

## 2021-09-30 ENCOUNTER — APPOINTMENT (OUTPATIENT)
Dept: CARDIOLOGY CLINIC | Age: 56
End: 2021-09-30

## 2021-09-30 VITALS — BODY MASS INDEX: 29.54 KG/M2 | HEIGHT: 71 IN | WEIGHT: 211 LBS

## 2021-09-30 DIAGNOSIS — E78.5 DYSLIPIDEMIA: ICD-10-CM

## 2021-09-30 DIAGNOSIS — G47.30 ORGANIC SLEEP APNEA: ICD-10-CM

## 2021-09-30 PROCEDURE — 93351 STRESS TTE COMPLETE: CPT | Performed by: SPECIALIST

## 2021-10-07 LAB
STRESS ANGINA INDEX: 0
STRESS BASELINE DIAS BP: 76 MMHG
STRESS BASELINE HR: 69 BPM
STRESS BASELINE SYS BP: 126 MMHG
STRESS ESTIMATED WORKLOAD: 9.5 METS
STRESS EXERCISE DUR MIN: NORMAL
STRESS O2 SAT PEAK: 98 %
STRESS O2 SAT REST: 97 %
STRESS PEAK DIAS BP: 82 MMHG
STRESS PEAK SYS BP: 178 MMHG
STRESS PERCENT HR ACHIEVED: 105 %
STRESS POST PEAK HR: 173 BPM
STRESS RATE PRESSURE PRODUCT: NORMAL BPM*MMHG
STRESS ST DEPRESSION: 0 MM
STRESS ST ELEVATION: 0 MM
STRESS TARGET HR: 165 BPM

## 2021-10-10 LAB
ALBUMIN SERPL-MCNC: 4.7 G/DL (ref 3.8–4.9)
ALP SERPL-CCNC: 92 IU/L (ref 44–121)
ALT SERPL-CCNC: 29 IU/L (ref 0–44)
AST SERPL-CCNC: 25 IU/L (ref 0–40)
BILIRUB DIRECT SERPL-MCNC: 0.18 MG/DL (ref 0–0.4)
BILIRUB SERPL-MCNC: 0.7 MG/DL (ref 0–1.2)
CHOLEST SERPL-MCNC: 142 MG/DL (ref 100–199)
HDLC SERPL-MCNC: 53 MG/DL
LDLC SERPL CALC-MCNC: 79 MG/DL (ref 0–99)
PROT SERPL-MCNC: 7.1 G/DL (ref 6–8.5)
TRIGL SERPL-MCNC: 43 MG/DL (ref 0–149)
VLDLC SERPL CALC-MCNC: 10 MG/DL (ref 5–40)

## 2021-10-13 ENCOUNTER — TELEPHONE (OUTPATIENT)
Dept: CARDIOLOGY CLINIC | Age: 56
End: 2021-10-13

## 2021-10-13 NOTE — TELEPHONE ENCOUNTER
----- Message from Maribel Hennessy MD sent at 10/11/2021 12:37 PM EDT -----  Lipids are at goal. No action needed. I would like the cholesterol checked again in 6 mo. Continue to eat healthy and clean.

## 2022-02-22 RX ORDER — ROSUVASTATIN CALCIUM 20 MG/1
TABLET, COATED ORAL
Qty: 90 TABLET | Refills: 3 | Status: SHIPPED | OUTPATIENT
Start: 2022-02-22

## 2022-02-22 NOTE — TELEPHONE ENCOUNTER

## 2022-03-18 PROBLEM — K64.8 BLEEDING INTERNAL HEMORRHOIDS: Status: ACTIVE | Noted: 2017-08-11

## 2022-03-19 PROBLEM — M16.12 PRIMARY OSTEOARTHRITIS OF LEFT HIP: Status: ACTIVE | Noted: 2017-12-26

## 2022-03-28 ENCOUNTER — OFFICE VISIT (OUTPATIENT)
Dept: FAMILY MEDICINE CLINIC | Age: 57
End: 2022-03-28
Payer: COMMERCIAL

## 2022-03-28 VITALS
BODY MASS INDEX: 31.33 KG/M2 | SYSTOLIC BLOOD PRESSURE: 136 MMHG | WEIGHT: 223.8 LBS | OXYGEN SATURATION: 97 % | RESPIRATION RATE: 16 BRPM | DIASTOLIC BLOOD PRESSURE: 83 MMHG | HEIGHT: 71 IN | HEART RATE: 66 BPM | TEMPERATURE: 97.5 F

## 2022-03-28 DIAGNOSIS — M25.532 WRIST PAIN, CHRONIC, LEFT: ICD-10-CM

## 2022-03-28 DIAGNOSIS — M25.50 POLYARTHRALGIA: Primary | ICD-10-CM

## 2022-03-28 DIAGNOSIS — M54.2 NECK PAIN: ICD-10-CM

## 2022-03-28 DIAGNOSIS — M79.641 PAIN OF RIGHT HAND: ICD-10-CM

## 2022-03-28 DIAGNOSIS — G89.29 WRIST PAIN, CHRONIC, LEFT: ICD-10-CM

## 2022-03-28 PROCEDURE — 99213 OFFICE O/P EST LOW 20 MIN: CPT | Performed by: FAMILY MEDICINE

## 2022-03-28 NOTE — PROGRESS NOTES
Pain Assessment Encounter      Hardik Daniels  Chief Complaint   Patient presents with    Neck Pain    Finger Pain     Visit Vitals  /83 (BP 1 Location: Left upper arm, BP Patient Position: Sitting, BP Cuff Size: Large adult)   Pulse 66   Temp 97.5 °F (36.4 °C) (Temporal)   Resp 16   Ht 5' 11\" (1.803 m)   Wt 223 lb 12.8 oz (101.5 kg)   SpO2 97%   BMI 31.21 kg/m²       Onset of Symptoms: chronic pain for years, worsening recently  Description:finger pain: sharp pain. Neck pain: intermittent sharp at the base of his neck, sometimes cant turn his head due to the pain and has headaches    Pain Scale:(1-10): 6  Trauma Hx: none  Hx of similar symptoms: Yes  Radiation: pain doesn't radiate    Progression: has worsened  What makes it better?: OTC meds  What makes it worse?:working  Medications tried: BC powder for arthritis    1. \"Have you been to the ER, urgent care clinic since your last visit? Hospitalized since your last visit? \" No    2. \"Have you seen or consulted any other health care providers outside of the [address] [place] [address] since your last visit? \" No     3. For patients aged 39-70: Has the patient had a colonoscopy / FIT/ Cologuard? Yes - Care Gap present.  Most recent result on file

## 2022-03-28 NOTE — PROGRESS NOTES
[address]     Chief Complaint:   Chief Complaint   Patient presents with    Neck Pain    Finger Pain       SUBJECTIVE:  Hardik Daniels is a 64 y.o. male who presents for Neck/hand pain. Neck/Hand pain:  - both started same, years ago, chronic, intermittent  - Works as a , swings hammers, working makes it worse  - Not much makes it better  - Not taking Aleve, Naproxen, Ibuprofen has bad acid reflux on Nexium  - Not using heat or ice  - Left handed. Pain on hands described as lateral wrist by thumb on Left hand and pointer finger on Right hand  - Neck pain described as at base of neck and at times gets so bad can't turn neck, denies radiation down arms  - tried Voltaren gel/Tylenol in past with out relief  - XR in past wnl.  - Lupus and inflam w/u negative (ESR, CRP)  - H/o of Carpel Tunnel b/l, s/p surgery b/l. He reports that the current sx are   - Denies inciting event or trauma, changes sensation, or weakness      PMHx:  Past Medical History:   Diagnosis Date    Anxiety     Arrhythmia     palpitations- saw cardiology 01/13/2017    Depression     -patient denies this 12/13/2017. states med is for Anxiety    Dyslipidemia     Esophagitis     GERD (gastroesophageal reflux disease)     barrets esophageus     Hemorrhoids, internal, with bleeding     Personal history of Mqxl-Zvwfe-Qtkwhas disease     Sleep apnea        Meds:   Current Outpatient Medications   Medication Sig Dispense Refill    rosuvastatin (CRESTOR) 20 mg tablet TAKE 1 TABLET BY MOUTH EVERY DAY AT NIGHT 90 Tablet 3    PARoxetine (PAXIL) 20 mg tablet TAKE 1 TABLET ORALLY ONCE DAILY 90 Tab 1    aspirin delayed-release 81 mg tablet Take  by mouth daily.  esomeprazole (NEXIUM) 20 mg capsule Take 40 mg by mouth every morning.          Allergies:   No Known Allergies    Smoker:  Social History     Tobacco Use   Smoking Status Former Smoker    Packs/day: 0.25    Years: 8.00    Pack years: 2.00    Quit date: 0    Years since quittin.2   Smokeless Tobacco Former User       ETOH:   Social History     Substance and Sexual Activity   Alcohol Use Yes    Alcohol/week: 5.0 standard drinks    Types: 5 Glasses of wine per week       FH:   Family History   Problem Relation Age of Onset    Heart Disease Mother     Heart Attack Mother         induced by gastric bleeding    COPD Father     Alcohol abuse Brother     Stroke Brother     OSTEOARTHRITIS Sister         Back surgery    Alcohol abuse Brother        ROS:  General/Constitutional:   No headache, fever, fatigue, weight loss or weight gain       Eyes:   No visual changes      Ears:    No changes      Neck:   No pain, or limited movement     Cardiac:    No chest pain      Respiratory:   No cough or shortness of breath     GI:   No nausea/vomiting, diarrhea, abdominal pain       :   No dysuria or  hematuria    Neurological:   No problems with balance, or unilateral weakness     Skin: No rash     Physical Exam:  Visit Vitals  /83 (BP 1 Location: Left upper arm, BP Patient Position: Sitting, BP Cuff Size: Large adult)   Pulse 66   Temp 97.5 °F (36.4 °C) (Temporal)   Resp 16   Ht 5' 11\" (1.803 m)   Wt 223 lb 12.8 oz (101.5 kg)   SpO2 97%   BMI 31.21 kg/m²     MSK:    Posture: Normal   Deformity: None    ROM - Cervical spine:     Flexion: Normal     Extension: Normal     Lateral bending: Normal    Upper Ext: FROM bilaterally       Palpation:    C1  T1 tenderness: None    Trapezious tenderness:      Strength (0-5/5):    :   Left: 5/5  Right: 5/5    Wrist Extension:  Left: 5/5  Right: 5/5    Wrist Flexion:  Left: 5/5  Right: 5/5    Elbow Flextion: Left: 5/5  Right: 5/5    Elbow Extension:  Left: 5/5  Right: 5/5    Shoulder Flexion:  Left: 5/5  Right: 5/5    Shoulder Abduction:  Left: 5/5  Right: 5/5    Shouder Ext Rotation: Left: 5/5  Right: 5/5    Shoulder Int Rotation: Left: 5/5  Right: 5/5       Sensation: Intact, no deficits in the upper ext bilaterally. Special test:    Spurlings: Left: Negative  Right: Negative    Wrist: bilaterally  Wrist Effusion: None  Deformity: None    ROM:  Flexion: Full  Extension: Full    Palpation:  Snuff box tenderness: Mild TTP Left hand    Other test:  Finkelsteins test: Negative  Phalens test: Negative  Tinels test: Positive, Left side    Strength (0-5/5):   Flexion:5/5  Extension: 5/5  : 5/5  Intrinsics: 5/5  EPL (extensor pollicis longus): 5/5  Pinch mechanism: 5/5        Assessment:    ICD-10-CM ICD-9-CM    1. Polyarthralgia  M25.50 719.49    2. Wrist pain, chronic, left  M25.532 719.43     G89.29 338.29    3. Neck pain  M54.2 723.1    4. Pain of right hand  M79.641 729.5      Wrist and hand pain likely related to DJD. Neck pain likely related to DDD. No radicular sx currently. Plan:  - Declined wrist/thumb splint  -  Ice 15 minutes, three times a day PRN and after exercise. Can alternate with heat for 15 minutes. Medications:    1. Naproxin (Aleve): 220mg 1-2 tablets twice a day PRN. 2. Acetaminophen (Tylenol):  500mg 1-2 tablets every 6 hours as needed for pain. RTC: PRN.

## 2022-05-06 ENCOUNTER — OFFICE VISIT (OUTPATIENT)
Dept: FAMILY MEDICINE CLINIC | Age: 57
End: 2022-05-06
Payer: COMMERCIAL

## 2022-05-06 VITALS
BODY MASS INDEX: 30.21 KG/M2 | RESPIRATION RATE: 18 BRPM | OXYGEN SATURATION: 97 % | HEIGHT: 71 IN | DIASTOLIC BLOOD PRESSURE: 78 MMHG | WEIGHT: 215.8 LBS | SYSTOLIC BLOOD PRESSURE: 126 MMHG | HEART RATE: 70 BPM

## 2022-05-06 DIAGNOSIS — K21.9 GASTROESOPHAGEAL REFLUX DISEASE, UNSPECIFIED WHETHER ESOPHAGITIS PRESENT: ICD-10-CM

## 2022-05-06 DIAGNOSIS — E78.2 MIXED HYPERLIPIDEMIA: ICD-10-CM

## 2022-05-06 DIAGNOSIS — Z00.00 ANNUAL PHYSICAL EXAM: Primary | ICD-10-CM

## 2022-05-06 PROCEDURE — 99396 PREV VISIT EST AGE 40-64: CPT | Performed by: FAMILY MEDICINE

## 2022-05-06 NOTE — PROGRESS NOTES
[id number] [facility]      Chief Complaint:   Chief Complaint   Patient presents with    Well Male       SUBJECTIVE:  Samantha Santiago is a 64 y.o. male who presents for annual physical.  No complaints at this time. HL: Currently taking Crestor 20mg daily. Denies side effects of the medication. GERD: Currently taking nexium 20mg daily. Reports that sx are well controlled. Anxiety: Currently taking paxil 20mg 1/2 tab daily. Reports sx are well controlled. H/o colon polyps: Last colonoscopy was . Next colonoscopy  (end of the year). No abd pain. No n/v/d. PMHx:  Past Medical History:   Diagnosis Date    Anxiety     Arrhythmia     palpitations- saw cardiology 2017    Depression     -patient denies this 2017. states med is for Anxiety    Dyslipidemia     Esophagitis     GERD (gastroesophageal reflux disease)     barrets esophageus     Hemorrhoids, internal, with bleeding     Personal history of Hyes-Nawkr-Cmovlcp disease     Sleep apnea        Meds:   Current Outpatient Medications   Medication Sig Dispense Refill    rosuvastatin (CRESTOR) 20 mg tablet TAKE 1 TABLET BY MOUTH EVERY DAY AT NIGHT 90 Tablet 3    PARoxetine (PAXIL) 20 mg tablet TAKE 1 TABLET ORALLY ONCE DAILY 90 Tab 1    aspirin delayed-release 81 mg tablet Take  by mouth daily.  esomeprazole (NEXIUM) 20 mg capsule Take 40 mg by mouth every morning.          Allergies:   No Known Allergies    Smoker:  Social History     Tobacco Use   Smoking Status Former Smoker    Packs/day: 0.25    Years: 8.00    Pack years: 2.00    Quit date: 0    Years since quittin.3   Smokeless Tobacco Former User       ETOH:   Social History     Substance and Sexual Activity   Alcohol Use Yes    Alcohol/week: 5.0 standard drinks    Types: 5 Glasses of wine per week       FH:   Family History   Problem Relation Age of Onset    Heart Disease Mother     Heart Attack Mother         induced by gastric bleeding    COPD Father     Alcohol abuse Brother     Stroke Brother     OSTEOARTHRITIS Sister         Back surgery    Alcohol abuse Brother        ROS:  General/Constitutional:   No headache, fever, fatigue, weight loss or weight gain       Eyes:   No visual changes      Ears:    No changes      Neck:   No pain, or limited movement     Cardiac:    No chest pain      Respiratory:   No cough or shortness of breath     GI:   No nausea/vomiting, diarrhea, abdominal pain       :   No dysuria or  hematuria    Neurological:   No problems with balance, or unilateral weakness     Skin: No rash     Physical Exam:  Visit Vitals  /78 (BP 1 Location: Right upper arm, BP Patient Position: Sitting, BP Cuff Size: Large adult)   Pulse 70   Resp 18   Ht 5' 11\" (1.803 m)   Wt 215 lb 12.8 oz (97.9 kg)   SpO2 97%   BMI 30.10 kg/m²     GEN: No apparent distress. Alert and oriented and responds to all questions appropriately. EYES:  Conjunctiva clear; pupils round and reactive to light; extraocular movements are intact. EAR: External ears are normal.    LUNGS: Respirations unlabored; clear to auscultation bilaterally  CARDIOVASCULAR: Regular, rate, and rhythm without murmurs, gallops or rubs   ABDOMEN:  normoactive bowel sounds;   NEUROLOGIC:  No focal neurologic deficits. EXT: Well perfused. No edema. SKIN: No obvious rashes. Assessment:    ICD-10-CM ICD-9-CM    1. Annual physical exam  Z00.00 V70.0 CBC W/O DIFF      METABOLIC PANEL, COMPREHENSIVE      LIPID PANEL      PSA W/ REFLX FREE PSA   2. Gastroesophageal reflux disease, unspecified whether esophagitis present  K21.9 530.81 CBC W/O DIFF      METABOLIC PANEL, COMPREHENSIVE      LIPID PANEL      PSA W/ REFLX FREE PSA   3.  Mixed hyperlipidemia  E78.2 272.2 CBC W/O DIFF      METABOLIC PANEL, COMPREHENSIVE      LIPID PANEL      PSA W/ REFLX FREE PSA       Plan:  HL: Continue taking Crestor 20mg daily. Labs today. GERD: Controlled. Continue taking nexium 20mg daily. Continue to f/u with GI for EGD every 3 years due to h/o Hadley's esophagitis. Anxiety: Controlled. Continue taking paxil 20mg 1/2 tab daily. H/o colon polyps:Next colonoscopy 2022 (end of the year). Tetanus up to date. He has received COVID vaccination x2 but no booster. Recommend getting booster. He declined shingrix. RTC: 1 year and PRN.

## 2022-05-06 NOTE — PROGRESS NOTES
Toan Campuzano is a 64 y.o. male    Chief Complaint   Patient presents with    Well Male     Last colonoscopy about 2 years ago. Gets them every 3 years by Dr Dominique Morales. 1. \"Have you been to the ER, urgent care clinic since your last visit? Hospitalized since your last visit? \" No    2. \"Have you seen or consulted any other health care providers outside of the [address] [place] [address] since your last visit? \" No     3. For patients aged 39-70: Has the patient had a colonoscopy / FIT/ Cologuard? Yes - Care Gap present. Rooming MA/LPN to request most recent results      Vitals:    05/06/22 1509   BP: 126/78   BP 1 Location: Right upper arm   BP Patient Position: Sitting   BP Cuff Size: Large adult   Pulse: 70   Resp: 18   Height: 5' 11\" (1.803 m)   Weight: 215 lb 12.8 oz (97.9 kg)   SpO2: 97%             Health Maintenance Due   Topic Date Due    Shingrix Vaccine Age 49> (1 of 2) Never done    Colorectal Cancer Screening Combo  06/27/2017    Depression Screen  03/26/2022         Medication Reconciliation completed, changes noted.   Please update medication list.

## 2022-05-07 LAB
ALBUMIN SERPL-MCNC: 4.5 G/DL (ref 3.8–4.9)
ALBUMIN/GLOB SERPL: 1.9 {RATIO} (ref 1.2–2.2)
ALP SERPL-CCNC: 85 IU/L (ref 44–121)
ALT SERPL-CCNC: 31 IU/L (ref 0–44)
AST SERPL-CCNC: 24 IU/L (ref 0–40)
BILIRUB SERPL-MCNC: 0.4 MG/DL (ref 0–1.2)
BUN SERPL-MCNC: 12 MG/DL (ref 6–24)
BUN/CREAT SERPL: 10 (ref 9–20)
CALCIUM SERPL-MCNC: 9.2 MG/DL (ref 8.7–10.2)
CHLORIDE SERPL-SCNC: 101 MMOL/L (ref 96–106)
CHOLEST SERPL-MCNC: 142 MG/DL (ref 100–199)
CO2 SERPL-SCNC: 25 MMOL/L (ref 20–29)
CREAT SERPL-MCNC: 1.21 MG/DL (ref 0.76–1.27)
EGFR: 70 ML/MIN/1.73
ERYTHROCYTE [DISTWIDTH] IN BLOOD BY AUTOMATED COUNT: 12.3 % (ref 11.6–15.4)
GLOBULIN SER CALC-MCNC: 2.4 G/DL (ref 1.5–4.5)
GLUCOSE SERPL-MCNC: 88 MG/DL (ref 65–99)
HCT VFR BLD AUTO: 41.9 % (ref 37.5–51)
HDLC SERPL-MCNC: 45 MG/DL
HGB BLD-MCNC: 14.5 G/DL (ref 13–17.7)
IMP & REVIEW OF LAB RESULTS: NORMAL
LDLC SERPL CALC-MCNC: 67 MG/DL (ref 0–99)
MCH RBC QN AUTO: 32.8 PG (ref 26.6–33)
MCHC RBC AUTO-ENTMCNC: 34.6 G/DL (ref 31.5–35.7)
MCV RBC AUTO: 95 FL (ref 79–97)
PLATELET # BLD AUTO: 187 X10E3/UL (ref 150–450)
POTASSIUM SERPL-SCNC: 4 MMOL/L (ref 3.5–5.2)
PROT SERPL-MCNC: 6.9 G/DL (ref 6–8.5)
PSA SERPL-MCNC: 0.6 NG/ML (ref 0–4)
RBC # BLD AUTO: 4.42 X10E6/UL (ref 4.14–5.8)
REFLEX CRITERIA: NORMAL
SODIUM SERPL-SCNC: 141 MMOL/L (ref 134–144)
TRIGL SERPL-MCNC: 180 MG/DL (ref 0–149)
VLDLC SERPL CALC-MCNC: 30 MG/DL (ref 5–40)
WBC # BLD AUTO: 5.2 X10E3/UL (ref 3.4–10.8)

## 2022-07-13 DIAGNOSIS — M79.671 CHRONIC PAIN OF BOTH FEET: Primary | ICD-10-CM

## 2022-07-13 DIAGNOSIS — M79.641 PAIN IN BOTH HANDS: ICD-10-CM

## 2022-07-13 DIAGNOSIS — E78.5 DYSLIPIDEMIA: ICD-10-CM

## 2022-07-13 DIAGNOSIS — M79.642 PAIN IN BOTH HANDS: ICD-10-CM

## 2022-07-13 DIAGNOSIS — M79.672 CHRONIC PAIN OF BOTH FEET: Primary | ICD-10-CM

## 2022-07-13 DIAGNOSIS — G47.30 ORGANIC SLEEP APNEA: ICD-10-CM

## 2022-07-13 DIAGNOSIS — R53.83 FATIGUE, UNSPECIFIED TYPE: ICD-10-CM

## 2022-07-13 DIAGNOSIS — G89.29 CHRONIC PAIN OF BOTH FEET: Primary | ICD-10-CM

## 2022-07-27 LAB
ALBUMIN SERPL-MCNC: 4.2 G/DL (ref 3.8–4.9)
ALBUMIN/GLOB SERPL: 2 {RATIO} (ref 1.2–2.2)
ALP SERPL-CCNC: 76 IU/L (ref 44–121)
ALT SERPL-CCNC: 24 IU/L (ref 0–44)
AST SERPL-CCNC: 18 IU/L (ref 0–40)
BILIRUB SERPL-MCNC: 0.7 MG/DL (ref 0–1.2)
BUN SERPL-MCNC: 16 MG/DL (ref 6–24)
BUN/CREAT SERPL: 14 (ref 9–20)
CALCIUM SERPL-MCNC: 9.1 MG/DL (ref 8.7–10.2)
CHLORIDE SERPL-SCNC: 106 MMOL/L (ref 96–106)
CO2 SERPL-SCNC: 26 MMOL/L (ref 20–29)
CREAT SERPL-MCNC: 1.16 MG/DL (ref 0.76–1.27)
EGFR: 74 ML/MIN/1.73
ERYTHROCYTE [DISTWIDTH] IN BLOOD BY AUTOMATED COUNT: 12.1 % (ref 11.6–15.4)
ERYTHROCYTE [SEDIMENTATION RATE] IN BLOOD BY WESTERGREN METHOD: 2 MM/HR (ref 0–30)
GLOBULIN SER CALC-MCNC: 2.1 G/DL (ref 1.5–4.5)
GLUCOSE SERPL-MCNC: 112 MG/DL (ref 65–99)
HCT VFR BLD AUTO: 41.4 % (ref 37.5–51)
HGB BLD-MCNC: 14 G/DL (ref 13–17.7)
MCH RBC QN AUTO: 32.3 PG (ref 26.6–33)
MCHC RBC AUTO-ENTMCNC: 33.8 G/DL (ref 31.5–35.7)
MCV RBC AUTO: 96 FL (ref 79–97)
PLATELET # BLD AUTO: 167 X10E3/UL (ref 150–450)
POTASSIUM SERPL-SCNC: 4 MMOL/L (ref 3.5–5.2)
PROT SERPL-MCNC: 6.3 G/DL (ref 6–8.5)
RBC # BLD AUTO: 4.33 X10E6/UL (ref 4.14–5.8)
RF IGM SER IA-ACNC: <7 U
SODIUM SERPL-SCNC: 145 MMOL/L (ref 134–144)
TSH SERPL DL<=0.005 MIU/L-ACNC: 1.42 UIU/ML (ref 0.45–4.5)
WBC # BLD AUTO: 4.9 X10E3/UL (ref 3.4–10.8)

## 2022-07-27 NOTE — PROGRESS NOTES
Your potassium and kidney function are normal and this is great news. Your blood counts ,thyroid test and sed rate and rheumatoid factors are all normal.I hope all is well.

## 2022-07-28 ENCOUNTER — TELEPHONE (OUTPATIENT)
Dept: CARDIOLOGY CLINIC | Age: 57
End: 2022-07-28

## 2022-10-20 DIAGNOSIS — F41.9 ANXIETY: ICD-10-CM

## 2022-10-21 RX ORDER — PAROXETINE HYDROCHLORIDE 20 MG/1
TABLET, FILM COATED ORAL
Qty: 90 TABLET | Refills: 1 | Status: SHIPPED | OUTPATIENT
Start: 2022-10-21

## 2023-03-16 RX ORDER — ROSUVASTATIN CALCIUM 20 MG/1
TABLET, COATED ORAL
Qty: 90 TABLET | Refills: 0 | Status: SHIPPED | OUTPATIENT
Start: 2023-03-16

## 2023-03-23 ENCOUNTER — OFFICE VISIT (OUTPATIENT)
Dept: CARDIOLOGY CLINIC | Age: 58
End: 2023-03-23

## 2023-03-23 VITALS
WEIGHT: 218 LBS | HEIGHT: 71 IN | HEART RATE: 58 BPM | BODY MASS INDEX: 30.52 KG/M2 | DIASTOLIC BLOOD PRESSURE: 82 MMHG | OXYGEN SATURATION: 98 % | SYSTOLIC BLOOD PRESSURE: 134 MMHG

## 2023-03-23 DIAGNOSIS — E78.5 DYSLIPIDEMIA: Primary | ICD-10-CM

## 2023-03-23 NOTE — PROGRESS NOTES
Portia Singh is a 62 y.o. male    Chief Complaint   Patient presents with    Follow-up    Cholesterol Problem       Vitals:    03/23/23 1553   BP: 134/82   BP 1 Location: Left upper arm   BP Patient Position: Sitting   Pulse: (!) 58   Height: 5' 11\" (1.803 m)   Weight: 218 lb (98.9 kg)   SpO2: 98%       Chest pain no    SOB no    Dizziness no    Swelling no    Refills no    Covid Vaccinated yes      1. Have you been to the ER, urgent care clinic since your last visit? Hospitalized since your last visit? No    2. Have you seen or consulted any other health care providers outside of the [address] [place] [address] since your last visit? Include any pap smears or colon screening.  No

## 2023-03-23 NOTE — LETTER
3/23/2023    Patient: Arthur Thornton   YOB: 1965   Date of Visit: 3/23/2023     Marco Simms MD  [address] [facility] [address]  [address] [facility] [address]    Dear Marco Simms MD,      Thank you for referring Mr. Nicole Bishop to [address] [place] [address] for evaluation. My notes for this consultation are attached. If you have questions, please do not hesitate to call me. I look forward to following your patient along with you.       Sincerely,    Omar Cruz MD

## 2023-03-23 NOTE — PATIENT INSTRUCTIONS

## 2023-03-23 NOTE — PROGRESS NOTES
CARDIOLOGY OFFICE NOTE    Yordy Navarrete MD, [address], [place], [address] [facility]  Phone [phone]; Fax [phone]  Mobile [phone]   Voice Mail [phone]    Primary care: Sudha Velasquez MD       ATTENTION:   This medical record was transcribed using an electronic medical records/speech recognition system. Although proofread, it may and can contain electronic, spelling and other errors. Corrections may be executed at a later time. Please feel free to contact us for any clarifications as needed. ICD-10-CM ICD-9-CM   1. Dyslipidemia  E78.5 272.4            Justen Espinal is a 62 y.o. male with  referred for dyslipidemia and fatigue          Cardiac risk factors: family history, dyslipidemia, male gender, hypertension  I have personally obtained the history from the patient. HISTORY OF PRESENTING ILLNESS        He states has been doing well and has not had any interval issues from a cardiac standpoint. He did not get his cholesterol drawn yet. As stated before he has a strong family history of heart disease. He is having some myalgias primarily on his thighs and can be difficult getting out of a seated position. He did have to leave work early 1 day because of leg pain. Also involves his feet. Today I talked about potential for myopathy and the importance of checking a CPK.          ACTIVE PROBLEM LIST     Patient Active Problem List    Diagnosis Date Noted    Polyarthralgia 03/28/2022    Wrist pain, chronic, left 03/28/2022    Neck pain 03/28/2022    Pain of right hand 03/28/2022    Primary osteoarthritis of left hip 12/26/2017    Personal history of Elfy-Kwsze-Uldfgnl disease     Bleeding internal hemorrhoids 08/11/2017    H/O colonoscopy 06/27/2016    History of depression 01/22/2015    Overweight(278.02) 11/28/2014    Organic sleep apnea 11/28/2014    GERD (gastroesophageal reflux disease) 06/19/2014    Hadley's esophagus 06/19/2014 PAST MEDICAL HISTORY     Past Medical History:   Diagnosis Date    Anxiety     Arrhythmia     palpitations- saw cardiology 2017    Depression     -patient denies this 2017. states med is for Anxiety    Dyslipidemia     Esophagitis     GERD (gastroesophageal reflux disease)     barrets esophageus     Hemorrhoids, internal, with bleeding     Personal history of Ldso-Ledfe-Aaihcdu disease     Sleep apnea            PAST SURGICAL HISTORY     Past Surgical History:   Procedure Laterality Date    HX CARPAL TUNNEL RELEASE Bilateral     HX COLONOSCOPY      HX ENDOSCOPY  2014    HX TONSILLECTOMY            ALLERGIES     No Known Allergies       FAMILY HISTORY     Family History   Problem Relation Age of Onset    Heart Disease Mother     Heart Attack Mother         induced by gastric bleeding    COPD Father     Alcohol abuse Brother     Stroke Brother     OSTEOARTHRITIS Sister         Back surgery    Alcohol abuse Brother     negative for cardiac disease       SOCIAL HISTORY     Social History     Socioeconomic History    Marital status: LIFE PARTNER   Tobacco Use    Smoking status: Former     Packs/day: 0.25     Years: 8.00     Pack years: 2.00     Types: Cigarettes     Quit date:      Years since quittin.2    Smokeless tobacco: Former   Substance and Sexual Activity    Alcohol use: Yes     Alcohol/week: 5.0 standard drinks     Types: 5 Glasses of wine per week    Drug use: Not Currently     Types: Marijuana     Comment: Every once in awhile    Sexual activity: Yes         MEDICATIONS     Current Outpatient Medications   Medication Sig    rosuvastatin (CRESTOR) 20 mg tablet TAKE 1 TABLET BY MOUTH EVERY DAY AT NIGHT    PARoxetine (PAXIL) 20 mg tablet TAKE 1 TABLET ORALLY ONCE DAILY    aspirin delayed-release 81 mg tablet Take  by mouth daily. esomeprazole (NEXIUM) 20 mg capsule Take 40 mg by mouth every morning. No current facility-administered medications for this visit.        I have reviewed the nurses notes, vitals, problem list, allergy list, medical history, family, social history and medications. REVIEW OF SYMPTOMS   Pertinent positive per HPI  General: Pt denies excessive weight gain or loss. Pt is able to conduct ADL's  HEENT: Denies blurred vision, headaches, hearing loss, epistaxis and difficulty swallowing. Respiratory: Denies cough, congestion, shortness of breath, MIRAMONTES, wheezing or stridor. Cardiovascular: Denies precordial pain, palpitations, edema or PND  Gastrointestinal: Denies poor appetite, indigestion, abdominal pain or blood in stool  Genitourinary: Denies hematuria, dysuria, increased urinary frequency  Musculoskeletal: Denies joint pain or swelling from muscles or joints  Neurologic: Denies tremor, paresthesias, headache, or sensory motor disturbance  Psychiatric: Denies confusion, insomnia, depression  Integumentray: Denies rash, itching or ulcers. Hematologic: Denies easy bruising, bleeding     PHYSICAL EXAMINATION      Vitals:    03/23/23 1553   BP: 134/82   Pulse: (!) 58   SpO2: 98%   Weight: 218 lb (98.9 kg)   Height: 5' 11\" (1.803 m)     General: Well developed, in no acute distress. HEENT: No jaundice  Neck: Supple, no stiffness  Heart: Slow regular rhythm  Respiratory: Clear bilaterally x 4, no wheezing or rales  Abdomen:   Soft, non-tender, bowel sounds are active. Extremities:  No edema, normal cap refill, no cyanosis. Musculoskeletal: No clubbing, no deformities  Neuro: A&Ox3, speech clear, gait stable, cooperative, no focal neurologic deficits  Skin: Skin color is normal. No rashes or lesions. Non diaphoretic, moist.          EKG: Normal sinus rhythm incomplete right bundle branch block     DIAGNOSTIC DATA     1. Lipids   3/8/16- , HDL 53, ,    3/26/21- , HDL 51, ,    5/6/22- , HDL 45, LDL 67,     2. Echo   1/24/17- EF 55-60%     3.  Stress Test   5/22/19-Treadmill-normal, mets 11, 9:23 min 9/30/21-Stress Echo-normal, mets 9.5, 6:50 min    4. Holter   1/13/17- SR, HR , occasional PAC/PVC's     5. Calcium Score   11/2/15- The coronary calcium in each vessel is as follows:       Left main coronary artery: 0   Left anterior descending coronary artery: 14   Left circumflex coronary artery: 0   Right coronary artery: 0   Posterior descending coronary artery: 0       Total calcium score: 14         LABORATORY DATA            Lab Results   Component Value Date/Time    WBC 4.9 07/21/2022 01:38 PM    HGB 14.0 07/21/2022 01:38 PM    HCT 41.4 07/21/2022 01:38 PM    PLATELET 388 30/11/2671 01:38 PM    MCV 96 07/21/2022 01:38 PM      Lab Results   Component Value Date/Time    Sodium 145 (H) 07/21/2022 01:38 PM    Potassium 4.0 07/21/2022 01:38 PM    Chloride 106 07/21/2022 01:38 PM    CO2 26 07/21/2022 01:38 PM    Anion gap 6 12/28/2017 02:45 AM    Glucose 112 (H) 07/21/2022 01:38 PM    BUN 16 07/21/2022 01:38 PM    Creatinine 1.16 07/21/2022 01:38 PM    BUN/Creatinine ratio 14 07/21/2022 01:38 PM    GFR est AA 91 03/26/2021 12:00 AM    GFR est non-AA 79 03/26/2021 12:00 AM    Calcium 9.1 07/21/2022 01:38 PM    Bilirubin, total 0.7 07/21/2022 01:38 PM    Alk. phosphatase 76 07/21/2022 01:38 PM    Protein, total 6.3 07/21/2022 01:38 PM    Albumin 4.2 07/21/2022 01:38 PM    Globulin 2.8 12/13/2017 02:52 PM    A-G Ratio 2.0 07/21/2022 01:38 PM    ALT (SGPT) 24 07/21/2022 01:38 PM           ASSESSMENT/RECOMMENDATIONS:.      1.  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 MARTIN  -Wears about 80% of the night  3.   Dyslipidemia  -Last LDL was in May 2022 and was 79  -Obtain a repeat cholesterol profile in 2 months  -Encourage diet low in red meat  -He does have some myalgias in his legs and difficulty getting up from a seated to a standing position  -We will check cholesterol and CPK now  -Once he gets his cholesterol checked and his CPK  1 to stop the Crestor for 1 month and informed about whether or not he seen an improvement in his thigh pain  4. Calcium score 14  -Respect modification going forward      With me in 6 months    Orders Placed This Encounter    AMB POC EKG ROUTINE W/ 12 LEADS, INTER & REP     Order Specific Question:   Reason for Exam:     Answer:   chol       We discussed the expected course, resolution and complications of the diagnosis(es) in detail. Medication risks, benefits, costs, interactions, and alternatives were discussed as indicated. I advised him to contact the office if his condition worsens, changes or fails to improve as anticipated. He expressed understanding with the diagnosis(es) and plan          Follow-up and Dispositions    Return in about 6 months (around 9/23/2023). I have discussed the diagnosis with  Xavi Velazquez and the intended plan as seen in the above orders. Questions were answered concerning future plans. I have discussed medication side effects and warnings with the patient as well. Thank you,  El Higuera MD for involving me in the care of  Xavi Velazquez. Please do not hesitate to contact me for further questions/concerns. Yordy Soler MD, [address]      [facility], [address] [place] [address], [address] [place] [address] Drive      [phone] / [phone] Fax

## 2023-03-29 LAB
ALBUMIN SERPL-MCNC: 4.6 G/DL (ref 3.8–4.9)
ALP SERPL-CCNC: 91 IU/L (ref 44–121)
ALT SERPL-CCNC: 25 IU/L (ref 0–44)
AST SERPL-CCNC: 19 IU/L (ref 0–40)
BILIRUB DIRECT SERPL-MCNC: 0.16 MG/DL (ref 0–0.4)
BILIRUB SERPL-MCNC: 0.5 MG/DL (ref 0–1.2)
CHOLEST SERPL-MCNC: 134 MG/DL (ref 100–199)
CK SERPL-CCNC: 245 U/L (ref 41–331)
HDLC SERPL-MCNC: 48 MG/DL
IMP & REVIEW OF LAB RESULTS: NORMAL
LDLC SERPL CALC-MCNC: 68 MG/DL (ref 0–99)
PROT SERPL-MCNC: 6.5 G/DL (ref 6–8.5)
TRIGL SERPL-MCNC: 99 MG/DL (ref 0–149)
VLDLC SERPL CALC-MCNC: 18 MG/DL (ref 5–40)

## 2023-04-19 ENCOUNTER — APPOINTMENT (OUTPATIENT)
Dept: CT IMAGING | Age: 58
End: 2023-04-19
Attending: FAMILY MEDICINE
Payer: COMMERCIAL

## 2023-04-19 ENCOUNTER — HOSPITAL ENCOUNTER (EMERGENCY)
Age: 58
Discharge: HOME | End: 2023-04-19
Attending: EMERGENCY MEDICINE
Payer: COMMERCIAL

## 2023-04-19 VITALS
SYSTOLIC BLOOD PRESSURE: 157 MMHG | HEIGHT: 71 IN | DIASTOLIC BLOOD PRESSURE: 83 MMHG | TEMPERATURE: 98 F | RESPIRATION RATE: 18 BRPM | OXYGEN SATURATION: 97 % | HEART RATE: 66 BPM | WEIGHT: 215 LBS | BODY MASS INDEX: 30.1 KG/M2

## 2023-04-19 DIAGNOSIS — K64.8 INTERNAL HEMORRHOID, BLEEDING: Primary | ICD-10-CM

## 2023-04-19 LAB
ABO + RH BLD: NORMAL
ALBUMIN SERPL-MCNC: 3.6 G/DL (ref 3.5–5)
ALBUMIN/GLOB SERPL: 1.1 (ref 1.1–2.2)
ALP SERPL-CCNC: 98 U/L (ref 45–117)
ALT SERPL-CCNC: 37 U/L (ref 12–78)
ANION GAP SERPL CALC-SCNC: 3 MMOL/L (ref 5–15)
AST SERPL-CCNC: 26 U/L (ref 15–37)
BASOPHILS # BLD: 0 K/UL (ref 0–0.1)
BASOPHILS NFR BLD: 1 % (ref 0–1)
BILIRUB SERPL-MCNC: 0.5 MG/DL (ref 0.2–1)
BLOOD GROUP ANTIBODIES SERPL: NORMAL
BUN SERPL-MCNC: 17 MG/DL (ref 6–20)
BUN/CREAT SERPL: 15 (ref 12–20)
CALCIUM SERPL-MCNC: 8.6 MG/DL (ref 8.5–10.1)
CHLORIDE SERPL-SCNC: 107 MMOL/L (ref 97–108)
CO2 SERPL-SCNC: 29 MMOL/L (ref 21–32)
CREAT SERPL-MCNC: 1.1 MG/DL (ref 0.7–1.3)
DIFFERENTIAL METHOD BLD: ABNORMAL
EOSINOPHIL # BLD: 0.2 K/UL (ref 0–0.4)
EOSINOPHIL NFR BLD: 4 % (ref 0–7)
ERYTHROCYTE [DISTWIDTH] IN BLOOD BY AUTOMATED COUNT: 11.9 % (ref 11.5–14.5)
GLOBULIN SER CALC-MCNC: 3.3 G/DL (ref 2–4)
GLUCOSE SERPL-MCNC: 141 MG/DL (ref 65–100)
HCT VFR BLD AUTO: 40.7 % (ref 36.6–50.3)
HEMOCCULT STL QL: POSITIVE
HGB BLD-MCNC: 14.1 G/DL (ref 12.1–17)
IMM GRANULOCYTES # BLD AUTO: 0 K/UL (ref 0–0.04)
IMM GRANULOCYTES NFR BLD AUTO: 0 % (ref 0–0.5)
LIPASE SERPL-CCNC: 127 U/L (ref 73–393)
LYMPHOCYTES # BLD: 1.2 K/UL (ref 0.8–3.5)
LYMPHOCYTES NFR BLD: 31 % (ref 12–49)
MCH RBC QN AUTO: 32.6 PG (ref 26–34)
MCHC RBC AUTO-ENTMCNC: 34.6 G/DL (ref 30–36.5)
MCV RBC AUTO: 94 FL (ref 80–99)
MONOCYTES # BLD: 0.4 K/UL (ref 0–1)
MONOCYTES NFR BLD: 11 % (ref 5–13)
NEUTS SEG # BLD: 2.1 K/UL (ref 1.8–8)
NEUTS SEG NFR BLD: 53 % (ref 32–75)
NRBC # BLD: 0 K/UL (ref 0–0.01)
NRBC BLD-RTO: 0 PER 100 WBC
PLATELET # BLD AUTO: 207 K/UL (ref 150–400)
PMV BLD AUTO: 10.5 FL (ref 8.9–12.9)
POTASSIUM SERPL-SCNC: 4 MMOL/L (ref 3.5–5.1)
PROT SERPL-MCNC: 6.9 G/DL (ref 6.4–8.2)
RBC # BLD AUTO: 4.33 M/UL (ref 4.1–5.7)
SODIUM SERPL-SCNC: 139 MMOL/L (ref 136–145)
SPECIMEN EXP DATE BLD: NORMAL
WBC # BLD AUTO: 4 K/UL (ref 4.1–11.1)

## 2023-04-19 PROCEDURE — 99285 EMERGENCY DEPT VISIT HI MDM: CPT

## 2023-04-19 PROCEDURE — 80053 COMPREHEN METABOLIC PANEL: CPT

## 2023-04-19 PROCEDURE — 85025 COMPLETE CBC W/AUTO DIFF WBC: CPT

## 2023-04-19 PROCEDURE — 82272 OCCULT BLD FECES 1-3 TESTS: CPT

## 2023-04-19 PROCEDURE — 86901 BLOOD TYPING SEROLOGIC RH(D): CPT

## 2023-04-19 PROCEDURE — 83690 ASSAY OF LIPASE: CPT

## 2023-04-19 PROCEDURE — 74011000636 HC RX REV CODE- 636: Performed by: EMERGENCY MEDICINE

## 2023-04-19 PROCEDURE — 74178 CT ABD&PLV WO CNTR FLWD CNTR: CPT

## 2023-04-19 PROCEDURE — 36415 COLL VENOUS BLD VENIPUNCTURE: CPT

## 2023-04-19 RX ADMIN — IOPAMIDOL 100 ML: 755 INJECTION, SOLUTION INTRAVENOUS at 11:42

## 2023-04-19 NOTE — ED TRIAGE NOTES
Pt arrives with bloody BM since Sunday  States hx of bleeding hemorrhoid     States colonoscopy within the last few months with no significant findings

## 2023-04-19 NOTE — ED PROVIDER NOTES
Patient is a 66-year-old male with pmh of anxiety, depression, esophagitis, GERD, hemorrhoids presenting for evaluation of rectal bleeding. Reports for the past 4 days he has had bowel movements that fill the toilet bowl up with dark red blood. He denies rectal pain, but reports some generalized abdominal discomfort. Notes that this has happened to him in the past and he has been previously diagnosed with internal hemorrhoids. He had a normal colonoscopy and endoscopy a few months ago. Past Medical History:   Diagnosis Date    Anxiety     Arrhythmia     palpitations- saw cardiology 2017    Depression     -patient denies this 2017. states med is for Anxiety    Dyslipidemia     Esophagitis     GERD (gastroesophageal reflux disease)     barrets esophageus     Hemorrhoids, internal, with bleeding     Personal history of Njqo-Jbgmi-Jcdkdpq disease     Sleep apnea        Past Surgical History:   Procedure Laterality Date    HX CARPAL TUNNEL RELEASE Bilateral     HX COLONOSCOPY      HX ENDOSCOPY  2014    HX TONSILLECTOMY           Family History:   Problem Relation Age of Onset    Heart Disease Mother     Heart Attack Mother         induced by gastric bleeding    COPD Father     Alcohol abuse Brother     Stroke Brother     OSTEOARTHRITIS Sister         Back surgery    Alcohol abuse Brother        Social History     Socioeconomic History    Marital status: LIFE PARTNER     Spouse name: Not on file    Number of children: Not on file    Years of education: Not on file    Highest education level: Not on file   Occupational History    Not on file   Tobacco Use    Smoking status: Former     Packs/day: 0.25     Years: 8.00     Pack years: 2.00     Types: Cigarettes     Quit date:      Years since quittin.3    Smokeless tobacco: Former   Substance and Sexual Activity    Alcohol use:  Yes     Alcohol/week: 5.0 standard drinks     Types: 5 Glasses of wine per week    Drug use: Not Currently Types: Marijuana     Comment: Every once in awhile    Sexual activity: Yes   Other Topics Concern    Not on file   Social History Narrative    Not on file     Social Determinants of Health     Financial Resource Strain: Not on file   Food Insecurity: Not on file   Transportation Needs: Not on file   Physical Activity: Not on file   Stress: Not on file   Social Connections: Not on file   Intimate Partner Violence: Not on file   Housing Stability: Not on file         ALLERGIES: Patient has no known allergies. Review of Systems   Constitutional:  Negative for fever and unexpected weight change. HENT:  Negative for congestion. Eyes:  Negative for visual disturbance. Respiratory:  Negative for cough, chest tightness and shortness of breath. Cardiovascular:  Negative for chest pain and palpitations. Gastrointestinal:  Positive for abdominal pain and anal bleeding. Negative for diarrhea, nausea and vomiting. Endocrine: Negative for polyuria. Genitourinary:  Negative for dysuria and flank pain. Musculoskeletal:  Negative for back pain. Skin:  Negative for color change. Allergic/Immunologic: Negative for immunocompromised state. Neurological:  Negative for dizziness and headaches. Hematological:  Negative for adenopathy. Psychiatric/Behavioral:  Negative for agitation. There were no vitals filed for this visit. Physical Exam  Vitals and nursing note reviewed. Constitutional:       General: He is not in acute distress. Appearance: Normal appearance. He is normal weight. HENT:      Head: Atraumatic. Eyes:      Conjunctiva/sclera: Conjunctivae normal.      Pupils: Pupils are equal, round, and reactive to light. Cardiovascular:      Rate and Rhythm: Normal rate. Pulmonary:      Effort: Pulmonary effort is normal. No respiratory distress. Abdominal:      General: Abdomen is flat. Palpations: Abdomen is soft. Tenderness: There is no abdominal tenderness.  There is no guarding or rebound. Musculoskeletal:         General: Normal range of motion. Cervical back: Neck supple. Skin:     General: Skin is warm and dry. Capillary Refill: Capillary refill takes less than 2 seconds. Neurological:      General: No focal deficit present. Mental Status: He is alert and oriented to person, place, and time. Mental status is at baseline. Psychiatric:         Mood and Affect: Mood normal.         Behavior: Behavior normal.        Medical Decision Making  Patient presenting with rectal bleeding. Previous diagnosis of internal hemorrhoid. Concerned with the amount of blood he may be having. Denies any significant rectal pain. Rectal exam at bedside revealing normal rectal tone, palpable internal hemorrhoid. No gross blood in rectal cavity. His vital signs are stable. Plan to obtain CBC to evaluate for any significant anemia. Will obtain CT of abdomen and pelvis with and without contrast to rule out active bleeding. 1220 -hemoglobin stable at 14.1. CT of abdomen pelvis interpreted by myself and radiologist with no active bleeding or other acute process. Patient safe to follow-up with his GI provider. Discussed my clinical impression(s), any labs and/or radiology results with the patient. I answered any questions and addressed any concerns. Discussed the importance of following up with their primary care physician and/or specialist(s). Discussed signs or symptoms that would warrant return back to the ER for further evaluation. The patient is agreeable with discharge. Amount and/or Complexity of Data Reviewed  Labs: ordered. Decision-making details documented in ED Course. Radiology: ordered and independent interpretation performed. Decision-making details documented in ED Course. Risk  Prescription drug management.            Procedures

## 2023-06-08 RX ORDER — ROSUVASTATIN CALCIUM 20 MG/1
20 TABLET, COATED ORAL NIGHTLY
Qty: 14 TABLET | Refills: 0 | Status: SHIPPED | OUTPATIENT
Start: 2023-06-08

## 2023-06-08 RX ORDER — ROSUVASTATIN CALCIUM 20 MG/1
20 TABLET, COATED ORAL NIGHTLY
Qty: 90 TABLET | Refills: 3 | Status: SHIPPED | OUTPATIENT
Start: 2023-06-08 | End: 2023-06-08 | Stop reason: SDUPTHER

## 2023-09-14 DIAGNOSIS — E78.5 HYPERLIPIDEMIA, UNSPECIFIED: Primary | ICD-10-CM

## 2023-09-14 DIAGNOSIS — E78.5 HYPERLIPIDEMIA, UNSPECIFIED: ICD-10-CM

## 2023-09-19 LAB
ALBUMIN SERPL-MCNC: 4.6 G/DL (ref 3.8–4.9)
ALP SERPL-CCNC: 76 IU/L (ref 44–121)
ALT SERPL-CCNC: 18 IU/L (ref 0–44)
AST SERPL-CCNC: 18 IU/L (ref 0–40)
BILIRUB DIRECT SERPL-MCNC: 0.15 MG/DL (ref 0–0.4)
BILIRUB SERPL-MCNC: 0.6 MG/DL (ref 0–1.2)
CHOLEST SERPL-MCNC: 185 MG/DL (ref 100–199)
HDLC SERPL-MCNC: 49 MG/DL
IMP & REVIEW OF LAB RESULTS: NORMAL
LDLC SERPL CALC-MCNC: 124 MG/DL (ref 0–99)
PROT SERPL-MCNC: 6.6 G/DL (ref 6–8.5)
TRIGL SERPL-MCNC: 66 MG/DL (ref 0–149)
VLDLC SERPL CALC-MCNC: 12 MG/DL (ref 5–40)

## 2023-09-26 ENCOUNTER — OFFICE VISIT (OUTPATIENT)
Age: 58
End: 2023-09-26
Payer: COMMERCIAL

## 2023-09-26 DIAGNOSIS — E78.5 HYPERLIPIDEMIA, UNSPECIFIED HYPERLIPIDEMIA TYPE: Primary | ICD-10-CM

## 2023-09-26 DIAGNOSIS — E78.5 HYPERLIPIDEMIA, UNSPECIFIED: ICD-10-CM

## 2023-09-26 DIAGNOSIS — G47.30 SLEEP APNEA, UNSPECIFIED TYPE: ICD-10-CM

## 2023-09-26 DIAGNOSIS — E78.5 HYPERLIPIDEMIA, UNSPECIFIED: Primary | ICD-10-CM

## 2023-09-26 PROCEDURE — 99214 OFFICE O/P EST MOD 30 MIN: CPT | Performed by: SPECIALIST

## 2023-09-26 RX ORDER — AMLODIPINE BESYLATE 2.5 MG/1
2.5 TABLET ORAL DAILY
Qty: 90 TABLET | Refills: 4 | Status: SHIPPED | OUTPATIENT
Start: 2023-09-26

## 2023-09-27 VITALS
HEART RATE: 65 BPM | DIASTOLIC BLOOD PRESSURE: 88 MMHG | BODY MASS INDEX: 29.68 KG/M2 | WEIGHT: 212 LBS | SYSTOLIC BLOOD PRESSURE: 138 MMHG | HEIGHT: 71 IN | OXYGEN SATURATION: 95 %

## 2023-11-03 SDOH — ECONOMIC STABILITY: INCOME INSECURITY: HOW HARD IS IT FOR YOU TO PAY FOR THE VERY BASICS LIKE FOOD, HOUSING, MEDICAL CARE, AND HEATING?: NOT HARD AT ALL

## 2023-11-03 SDOH — ECONOMIC STABILITY: FOOD INSECURITY: WITHIN THE PAST 12 MONTHS, YOU WORRIED THAT YOUR FOOD WOULD RUN OUT BEFORE YOU GOT MONEY TO BUY MORE.: NEVER TRUE

## 2023-11-03 SDOH — ECONOMIC STABILITY: FOOD INSECURITY: WITHIN THE PAST 12 MONTHS, THE FOOD YOU BOUGHT JUST DIDN'T LAST AND YOU DIDN'T HAVE MONEY TO GET MORE.: NEVER TRUE

## 2023-11-03 SDOH — ECONOMIC STABILITY: TRANSPORTATION INSECURITY
IN THE PAST 12 MONTHS, HAS LACK OF TRANSPORTATION KEPT YOU FROM MEETINGS, WORK, OR FROM GETTING THINGS NEEDED FOR DAILY LIVING?: NO

## 2023-11-03 SDOH — ECONOMIC STABILITY: HOUSING INSECURITY
IN THE LAST 12 MONTHS, WAS THERE A TIME WHEN YOU DID NOT HAVE A STEADY PLACE TO SLEEP OR SLEPT IN A SHELTER (INCLUDING NOW)?: NO

## 2023-11-06 ENCOUNTER — OFFICE VISIT (OUTPATIENT)
Age: 58
End: 2023-11-06
Payer: COMMERCIAL

## 2023-11-06 VITALS
DIASTOLIC BLOOD PRESSURE: 76 MMHG | HEART RATE: 59 BPM | WEIGHT: 213.25 LBS | OXYGEN SATURATION: 98 % | BODY MASS INDEX: 29.74 KG/M2 | SYSTOLIC BLOOD PRESSURE: 145 MMHG

## 2023-11-06 DIAGNOSIS — Z00.00 ANNUAL PHYSICAL EXAM: ICD-10-CM

## 2023-11-06 DIAGNOSIS — I10 HTN (HYPERTENSION), BENIGN: ICD-10-CM

## 2023-11-06 DIAGNOSIS — K21.9 GASTROESOPHAGEAL REFLUX DISEASE, UNSPECIFIED WHETHER ESOPHAGITIS PRESENT: ICD-10-CM

## 2023-11-06 DIAGNOSIS — E78.2 MIXED HYPERLIPIDEMIA: Primary | ICD-10-CM

## 2023-11-06 PROCEDURE — 3078F DIAST BP <80 MM HG: CPT | Performed by: FAMILY MEDICINE

## 2023-11-06 PROCEDURE — 3077F SYST BP >= 140 MM HG: CPT | Performed by: FAMILY MEDICINE

## 2023-11-06 PROCEDURE — 99214 OFFICE O/P EST MOD 30 MIN: CPT | Performed by: FAMILY MEDICINE

## 2023-11-06 RX ORDER — LISINOPRIL 10 MG/1
10 TABLET ORAL DAILY
Qty: 30 TABLET | Refills: 2 | Status: SHIPPED | OUTPATIENT
Start: 2023-11-06

## 2023-11-06 RX ORDER — PAROXETINE 10 MG/1
10 TABLET, FILM COATED ORAL EVERY MORNING
Qty: 90 TABLET | Refills: 3 | Status: SHIPPED | OUTPATIENT
Start: 2023-11-06

## 2023-11-06 SDOH — ECONOMIC STABILITY: INCOME INSECURITY: HOW HARD IS IT FOR YOU TO PAY FOR THE VERY BASICS LIKE FOOD, HOUSING, MEDICAL CARE, AND HEATING?: NOT HARD AT ALL

## 2023-11-06 SDOH — ECONOMIC STABILITY: FOOD INSECURITY: WITHIN THE PAST 12 MONTHS, THE FOOD YOU BOUGHT JUST DIDN'T LAST AND YOU DIDN'T HAVE MONEY TO GET MORE.: NEVER TRUE

## 2023-11-06 SDOH — ECONOMIC STABILITY: FOOD INSECURITY: WITHIN THE PAST 12 MONTHS, YOU WORRIED THAT YOUR FOOD WOULD RUN OUT BEFORE YOU GOT MONEY TO BUY MORE.: NEVER TRUE

## 2023-11-06 ASSESSMENT — PATIENT HEALTH QUESTIONNAIRE - PHQ9
2. FEELING DOWN, DEPRESSED OR HOPELESS: 0
SUM OF ALL RESPONSES TO PHQ9 QUESTIONS 1 & 2: 0
SUM OF ALL RESPONSES TO PHQ QUESTIONS 1-9: 0
SUM OF ALL RESPONSES TO PHQ QUESTIONS 1-9: 0
1. LITTLE INTEREST OR PLEASURE IN DOING THINGS: 0
SUM OF ALL RESPONSES TO PHQ QUESTIONS 1-9: 0
SUM OF ALL RESPONSES TO PHQ QUESTIONS 1-9: 0

## 2023-11-07 LAB
BUN SERPL-MCNC: 15 MG/DL (ref 6–24)
BUN/CREAT SERPL: 15 (ref 9–20)
CALCIUM SERPL-MCNC: 9.3 MG/DL (ref 8.7–10.2)
CHLORIDE SERPL-SCNC: 102 MMOL/L (ref 96–106)
CO2 SERPL-SCNC: 24 MMOL/L (ref 20–29)
CREAT SERPL-MCNC: 0.99 MG/DL (ref 0.76–1.27)
EGFRCR SERPLBLD CKD-EPI 2021: 89 ML/MIN/1.73
ERYTHROCYTE [DISTWIDTH] IN BLOOD BY AUTOMATED COUNT: 12.1 % (ref 11.6–15.4)
GLUCOSE SERPL-MCNC: 112 MG/DL (ref 70–99)
HCT VFR BLD AUTO: 44.2 % (ref 37.5–51)
HCV IGG SERPL QL IA: NON REACTIVE
HGB BLD-MCNC: 15.2 G/DL (ref 13–17.7)
MCH RBC QN AUTO: 32.6 PG (ref 26.6–33)
MCHC RBC AUTO-ENTMCNC: 34.4 G/DL (ref 31.5–35.7)
MCV RBC AUTO: 95 FL (ref 79–97)
PLATELET # BLD AUTO: 213 X10E3/UL (ref 150–450)
POTASSIUM SERPL-SCNC: 4.9 MMOL/L (ref 3.5–5.2)
RBC # BLD AUTO: 4.66 X10E6/UL (ref 4.14–5.8)
SODIUM SERPL-SCNC: 141 MMOL/L (ref 134–144)
SPECIMEN STATUS REPORT: NORMAL
TSH SERPL DL<=0.005 MIU/L-ACNC: 2.91 UIU/ML (ref 0.45–4.5)
WBC # BLD AUTO: 3.9 X10E3/UL (ref 3.4–10.8)

## 2023-11-27 RX ORDER — LISINOPRIL 10 MG/1
10 TABLET ORAL DAILY
Qty: 30 TABLET | Refills: 2 | Status: CANCELLED | OUTPATIENT
Start: 2023-11-27

## 2024-02-20 RX ORDER — LISINOPRIL 10 MG/1
10 TABLET ORAL DAILY
Qty: 30 TABLET | Refills: 2 | OUTPATIENT
Start: 2024-02-20

## 2024-02-20 RX ORDER — LISINOPRIL 10 MG/1
10 TABLET ORAL DAILY
Qty: 90 TABLET | Refills: 1 | Status: SHIPPED | OUTPATIENT
Start: 2024-02-20 | End: 2024-02-22 | Stop reason: SDUPTHER

## 2024-02-20 RX ORDER — LISINOPRIL 10 MG/1
10 TABLET ORAL DAILY
Qty: 30 TABLET | Refills: 11 | OUTPATIENT
Start: 2024-02-20

## 2024-02-22 RX ORDER — LISINOPRIL 10 MG/1
10 TABLET ORAL DAILY
Qty: 90 TABLET | Refills: 0 | Status: SHIPPED | OUTPATIENT
Start: 2024-02-22

## 2024-03-20 DIAGNOSIS — E78.5 HYPERLIPIDEMIA, UNSPECIFIED HYPERLIPIDEMIA TYPE: Primary | ICD-10-CM

## 2024-04-17 RX ORDER — PAROXETINE 10 MG/1
10 TABLET, FILM COATED ORAL EVERY MORNING
Qty: 90 TABLET | Refills: 3 | Status: SHIPPED | OUTPATIENT
Start: 2024-04-17

## 2024-04-17 RX ORDER — PAROXETINE 10 MG/1
10 TABLET, FILM COATED ORAL EVERY MORNING
Qty: 90 TABLET | Refills: 3 | OUTPATIENT
Start: 2024-04-17

## 2024-06-05 DIAGNOSIS — E78.5 HYPERLIPIDEMIA, UNSPECIFIED HYPERLIPIDEMIA TYPE: ICD-10-CM

## 2024-06-05 LAB
ALBUMIN SERPL-MCNC: 4 G/DL (ref 3.5–5)
ALBUMIN/GLOB SERPL: 1.5 (ref 1.1–2.2)
ALP SERPL-CCNC: 84 U/L (ref 45–117)
ALT SERPL-CCNC: 31 U/L (ref 12–78)
AST SERPL-CCNC: 18 U/L (ref 15–37)
BILIRUB DIRECT SERPL-MCNC: 0.2 MG/DL (ref 0–0.2)
BILIRUB SERPL-MCNC: 0.8 MG/DL (ref 0.2–1)
CHOLEST SERPL-MCNC: 159 MG/DL
GLOBULIN SER CALC-MCNC: 2.7 G/DL (ref 2–4)
HDLC SERPL-MCNC: 58 MG/DL
HDLC SERPL: 2.7 (ref 0–5)
LDLC SERPL CALC-MCNC: 85 MG/DL (ref 0–100)
PROT SERPL-MCNC: 6.7 G/DL (ref 6.4–8.2)
TRIGL SERPL-MCNC: 80 MG/DL
VLDLC SERPL CALC-MCNC: 16 MG/DL

## 2024-06-07 ENCOUNTER — OFFICE VISIT (OUTPATIENT)
Age: 59
End: 2024-06-07
Payer: COMMERCIAL

## 2024-06-07 VITALS
WEIGHT: 211 LBS | RESPIRATION RATE: 18 BRPM | OXYGEN SATURATION: 96 % | HEART RATE: 57 BPM | BODY MASS INDEX: 29.43 KG/M2 | DIASTOLIC BLOOD PRESSURE: 88 MMHG | SYSTOLIC BLOOD PRESSURE: 132 MMHG

## 2024-06-07 VITALS
BODY MASS INDEX: 29.65 KG/M2 | HEART RATE: 76 BPM | WEIGHT: 211.8 LBS | HEIGHT: 71 IN | DIASTOLIC BLOOD PRESSURE: 84 MMHG | OXYGEN SATURATION: 97 % | SYSTOLIC BLOOD PRESSURE: 128 MMHG

## 2024-06-07 DIAGNOSIS — E78.5 HYPERLIPIDEMIA, UNSPECIFIED HYPERLIPIDEMIA TYPE: Primary | ICD-10-CM

## 2024-06-07 DIAGNOSIS — E78.5 HYPERLIPIDEMIA, UNSPECIFIED HYPERLIPIDEMIA TYPE: ICD-10-CM

## 2024-06-07 DIAGNOSIS — M25.572 ACUTE LEFT ANKLE PAIN: Primary | ICD-10-CM

## 2024-06-07 DIAGNOSIS — G47.30 SLEEP APNEA, UNSPECIFIED TYPE: ICD-10-CM

## 2024-06-07 PROCEDURE — 93000 ELECTROCARDIOGRAM COMPLETE: CPT | Performed by: SPECIALIST

## 2024-06-07 PROCEDURE — 99214 OFFICE O/P EST MOD 30 MIN: CPT | Performed by: SPECIALIST

## 2024-06-07 PROCEDURE — 99213 OFFICE O/P EST LOW 20 MIN: CPT | Performed by: STUDENT IN AN ORGANIZED HEALTH CARE EDUCATION/TRAINING PROGRAM

## 2024-06-07 RX ORDER — MELOXICAM 7.5 MG/1
7.5 TABLET ORAL DAILY
Qty: 14 TABLET | Refills: 0 | Status: SHIPPED | OUTPATIENT
Start: 2024-06-07

## 2024-06-07 ASSESSMENT — PATIENT HEALTH QUESTIONNAIRE - PHQ9
SUM OF ALL RESPONSES TO PHQ QUESTIONS 1-9: 0
SUM OF ALL RESPONSES TO PHQ9 QUESTIONS 1 & 2: 0
SUM OF ALL RESPONSES TO PHQ QUESTIONS 1-9: 0
1. LITTLE INTEREST OR PLEASURE IN DOING THINGS: NOT AT ALL
SUM OF ALL RESPONSES TO PHQ QUESTIONS 1-9: 0
2. FEELING DOWN, DEPRESSED OR HOPELESS: NOT AT ALL
SUM OF ALL RESPONSES TO PHQ QUESTIONS 1-9: 0

## 2024-06-07 NOTE — PROGRESS NOTES
CARDIOLOGY OFFICE NOTE    Wilber Wang MD, [facility]    [address], [place] [address]  Phone [phone]; Fax [phone]  Mobile [phone]   Voice Mail [phone]    Primary care: Rubio Vazquez MD       ATTENTION:   This medical record was transcribed using an electronic medical records/speech recognition system.  Although proofread, it may and can contain electronic, spelling and other errors.  Corrections may be executed at a later time.  Please feel free to contact us for any clarifications as needed.             Julito Mix is a 58 y.o. male with  referred for  dyslipidemia and fatigue            Cardiac risk factors: family history, dyslipidemia, male gender, hypertension   I have personally obtained the history from the patient.    HISTORY OF PRESENTING ILLNESS    Ms./Mr. Julito Mix  58 y.o. is is a very nice gentleman who states he has been doing well with no interval issues.  He has a strong family history of heart disease.  He is taking his Crestor every other day and his LDLs come down from 168- >85.  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  Does not describe any chest pain worsening shortness of breath or anginal equivalents.       ACTIVE PROBLEM LIST     Patient Active Problem List    Diagnosis Date Noted    Polyarthralgia 03/28/2022    Wrist pain, chronic, left 03/28/2022    Neck pain 03/28/2022    Pain of right hand 03/28/2022    Primary osteoarthritis of left hip 12/26/2017    Personal history of Gmxw-Uktxm-Qkaeswd disease     Bleeding internal hemorrhoids 08/11/2017    H/O colonoscopy 06/27/2016    History of depression 01/22/2015    Organic sleep apnea 11/28/2014    GERD (gastroesophageal reflux disease) 06/19/2014    Elkins's esophagus 06/19/2014           PAST MEDICAL HISTORY

## 2024-06-07 NOTE — PATIENT INSTRUCTIONS
Take Meloxicam daily for 1 week, then take as needed. Do NOT take Advil, Aleve, Ibuprofen, or Motrin while taking Meloxicam. You may take Tylenol.

## 2024-06-07 NOTE — PROGRESS NOTES
Patient states for about a month he's had left ankle pain. Unsure if he injured himself or hit it. He states its been bruised and swelling a lot. He states applying pressure it hurts a lot. He just been taking some ibuprofen to easy the pain but not helping.  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  Chief Complaint   Patient presents with    Ankle Pain     left     Vitals:    06/07/24 1045   BP: 132/88   Pulse: 57   Resp: 18   SpO2: 96%

## 2024-06-07 NOTE — PROGRESS NOTES
[facility]   [facility]      Chief Complaint:   Chief Complaint   Patient presents with    Ankle Pain     left         Subjective:     Julito Mix is an 58 y.o. male who presents with left ankle pain.      - Started ~1 month ago  - Had bruising initially   - Was swollen, this has been improving  - Works as a    - No prior h/o injury   - No numbness or tingling   - Pain is intermittent, worse with activity   - Taking Ibuprofen without significant relief     Past Medical History:   Diagnosis Date    Anxiety     Arrhythmia     palpitations- saw cardiology 01/13/2017    Depression     -patient denies this 12/13/2017. states med is for Anxiety    Dyslipidemia     Esophagitis     GERD (gastroesophageal reflux disease)     barrets esophageus     Hemorrhoids, internal, with bleeding     Personal history of Eoer-Vookc-Ydssxkp disease     Sleep apnea      Family History   Problem Relation Age of Onset    COPD Father     Heart Attack Mother         induced by gastric bleeding    Heart Disease Mother     Alcohol Abuse Brother     Stroke Brother     Alcohol Abuse Brother     Osteoarthritis Sister         Back surgery     Current Outpatient Medications   Medication Sig Dispense Refill    meloxicam (MOBIC) 7.5 MG tablet Take 1 tablet by mouth daily 14 tablet 0    PARoxetine (PAXIL) 10 MG tablet Take 1 tablet by mouth every morning 90 tablet 3    lisinopril (PRINIVIL;ZESTRIL) 10 MG tablet Take 1 tablet by mouth daily 90 tablet 0    magnesium citrate solution Take 296 mLs by mouth once      rosuvastatin (CRESTOR) 20 MG tablet Take 1 tablet by mouth nightly (Patient taking differently: Take 1 tablet by mouth every other day) 14 tablet 0    aspirin 81 MG EC tablet Take by mouth daily      esomeprazole (NEXIUM) 40 MG delayed release capsule Take 1 capsule by mouth every morning       No current facility-administered medications for this visit.

## 2024-06-07 NOTE — PATIENT INSTRUCTIONS

## 2024-06-07 NOTE — PROGRESS NOTES
NAME Julito Mix         1965      MRN    [id number]      LAST OFFICE APPOINTMENT: Visit date not found     DIAGNOSIS    ICD-10-CM    1. Hyperlipidemia, unspecified hyperlipidemia type  E78.5 EKG 12 Lead          HOME MEDICATION  Current Outpatient Medications   Medication Sig    PARoxetine (PAXIL) 10 MG tablet Take 1 tablet by mouth every morning    lisinopril (PRINIVIL;ZESTRIL) 10 MG tablet Take 1 tablet by mouth daily    magnesium citrate solution Take 296 mLs by mouth once    rosuvastatin (CRESTOR) 20 MG tablet Take 1 tablet by mouth nightly (Patient taking differently: Take 1 tablet by mouth every other day)    aspirin 81 MG EC tablet Take by mouth daily    esomeprazole (NEXIUM) 40 MG delayed release capsule Take 1 capsule by mouth every morning     No current facility-administered medications for this visit.       VITAL SIGNS  Wt Readings from Last 3 Encounters:   24 96.1 kg (211 lb 12.8 oz)   23 96.7 kg (213 lb 4 oz)   23 96.2 kg (212 lb)     BP Readings from Last 3 Encounters:   24 128/84   23 (!) 145/76   23 138/88     Pulse Readings from Last 3 Encounters:   24 76   23 59   23 65         SPECIALTY COMMENTS  1. Lipids    3/8/16- , HDL 53, ,     3/26/21- , HDL 51, ,     22- , HDL 45, LDL 67,    23- , HDL 49, , TG 66  24- , HDL 58, LDL 85, TG 80     2. Echo    17- EF 55-60%       3. Stress Test    19-Treadmill-normal, mets 11, 9:23 min    21-Stress Echo-normal, mets 9.5, 6:50 min      4. Holter    17- SR, HR , occasional PAC/PVC's       5. Calcium Score    11/2/15- The coronary calcium in each vessel is as follows:         Left main coronary artery: 0    Left anterior descending coronary artery: 14    Left circumflex coronary artery: 0    Right coronary artery: 0    Posterior descending coronary artery: 0         Total calcium score:

## 2024-07-27 RX ORDER — LISINOPRIL 10 MG/1
10 TABLET ORAL DAILY
Qty: 90 TABLET | Refills: 1 | Status: SHIPPED | OUTPATIENT
Start: 2024-07-27

## 2024-08-07 RX ORDER — ROSUVASTATIN CALCIUM 20 MG/1
20 TABLET, COATED ORAL NIGHTLY
Qty: 90 TABLET | Refills: 3 | Status: SHIPPED | OUTPATIENT
Start: 2024-08-07

## 2024-08-07 NOTE — TELEPHONE ENCOUNTER
Refill per VO of Dr. Ramos  Last appt: 6/7/2024    Future Appointments   Date Time Provider Department Center   12/10/2024  8:20 AM Walt Ramos MD [facility]       Requested Prescriptions     Signed Prescriptions Disp Refills    rosuvastatin (CRESTOR) 20 MG tablet 90 tablet 3     Sig: TAKE 1 TABLET NIGHTLY     Authorizing Provider: WALT RAMOS     Ordering User: MARANDA MEDINA

## 2024-09-02 ENCOUNTER — HOSPITAL ENCOUNTER (EMERGENCY)
Facility: HOSPITAL | Age: 59
Discharge: HOME | End: 2024-09-02
Attending: EMERGENCY MEDICINE
Payer: COMMERCIAL

## 2024-09-02 ENCOUNTER — APPOINTMENT (OUTPATIENT)
Facility: HOSPITAL | Age: 59
End: 2024-09-02
Payer: COMMERCIAL

## 2024-09-02 VITALS
RESPIRATION RATE: 17 BRPM | HEART RATE: 69 BPM | SYSTOLIC BLOOD PRESSURE: 139 MMHG | TEMPERATURE: 98 F | OXYGEN SATURATION: 98 % | BODY MASS INDEX: 28.7 KG/M2 | WEIGHT: 205 LBS | HEIGHT: 71 IN | DIASTOLIC BLOOD PRESSURE: 87 MMHG

## 2024-09-02 DIAGNOSIS — S60.012A CONTUSION OF LEFT THUMB WITHOUT DAMAGE TO NAIL, INITIAL ENCOUNTER: Primary | ICD-10-CM

## 2024-09-02 PROCEDURE — 99284 EMERGENCY DEPT VISIT MOD MDM: CPT

## 2024-09-02 PROCEDURE — 6360000002 HC RX W HCPCS: Performed by: PHYSICIAN ASSISTANT

## 2024-09-02 PROCEDURE — 73130 X-RAY EXAM OF HAND: CPT

## 2024-09-02 PROCEDURE — 96372 THER/PROPH/DIAG INJ SC/IM: CPT

## 2024-09-02 RX ORDER — KETOROLAC TROMETHAMINE 30 MG/ML
30 INJECTION, SOLUTION INTRAMUSCULAR; INTRAVENOUS ONCE
Status: COMPLETED | OUTPATIENT
Start: 2024-09-02 | End: 2024-09-02

## 2024-09-02 RX ADMIN — KETOROLAC TROMETHAMINE 30 MG: 30 INJECTION, SOLUTION INTRAMUSCULAR at 14:19

## 2024-09-02 ASSESSMENT — PAIN DESCRIPTION - LOCATION
LOCATION: HAND
LOCATION: HAND

## 2024-09-02 ASSESSMENT — PAIN SCALES - GENERAL
PAINLEVEL_OUTOF10: 3
PAINLEVEL_OUTOF10: 6

## 2024-09-02 ASSESSMENT — PAIN DESCRIPTION - ORIENTATION
ORIENTATION: LEFT
ORIENTATION: LEFT

## 2024-09-02 ASSESSMENT — PAIN DESCRIPTION - PAIN TYPE: TYPE: ACUTE PAIN

## 2024-09-02 ASSESSMENT — PAIN - FUNCTIONAL ASSESSMENT: PAIN_FUNCTIONAL_ASSESSMENT: 0-10

## 2024-09-02 ASSESSMENT — PAIN DESCRIPTION - DESCRIPTORS
DESCRIPTORS: ACHING
DESCRIPTORS: DULL

## 2024-09-02 NOTE — ED TRIAGE NOTES
Pt presents to ER for concern of a left broken thumb. Tree log fell on it yesterday and patient heard it crack. Bruising up the wrist posterior is present.

## 2024-09-02 NOTE — DISCHARGE INSTRUCTIONS
Wear the splint until you are able to see orthopedics.  You may remove it for hygiene, in order to clean the hand, then dry her hand and replaced the splint.    Elevate the hand, use ibuprofen or Tylenol as needed for pain and discomfort, you may apply cool packs to the thumb.    Call tomorrow to schedule an appointment with orthopedics for follow-up    Sometimes a fracture is not evident in the initial x-ray, although it does not appear as if there is a fracture at this time, because of the swelling and pain that you are having, we are treating it like you may have one, therefore, follow up with orthopedics will follow you.

## 2024-09-02 NOTE — ED PROVIDER NOTES
RELEASE Bilateral     COLONOSCOPY      JOINT REPLACEMENT      TONSILLECTOMY      UPPER GASTROINTESTINAL ENDOSCOPY  2014         CURRENT MEDICATIONS       Discharge Medication List as of 2024  3:39 PM        CONTINUE these medications which have NOT CHANGED    Details   rosuvastatin (CRESTOR) 20 MG tablet TAKE 1 TABLET NIGHTLY, Disp-90 tablet, R-3Normal      lisinopril (PRINIVIL;ZESTRIL) 10 MG tablet TAKE 1 TABLET DAILY, Disp-90 tablet, R-1Normal      meloxicam (MOBIC) 7.5 MG tablet Take 1 tablet by mouth daily, Disp-14 tablet, R-0Normal      PARoxetine (PAXIL) 10 MG tablet Take 1 tablet by mouth every morning, Disp-90 tablet, R-3Normal      magnesium citrate solution Take 296 mLs by mouth onceHistorical Med      aspirin 81 MG EC tablet Take by mouth dailyHistorical Med      esomeprazole (NEXIUM) 40 MG delayed release capsule Take 1 capsule by mouth every morningHistorical Med             ALLERGIES     Patient has no known allergies.    FAMILY HISTORY       Family History   Problem Relation Age of Onset    COPD Father     Heart Attack Mother         induced by gastric bleeding    Heart Disease Mother     Alcohol Abuse Brother     Stroke Brother     Alcohol Abuse Brother     Osteoarthritis Sister         Back surgery          SOCIAL HISTORY       Social History     Socioeconomic History    Marital status: Life Partner   Tobacco Use    Smoking status: Former     Current packs/day: 0.00     Average packs/day: 0.3 packs/day for 5.0 years (1.3 ttl pk-yrs)     Types: Cigarettes     Start date: 1976     Quit date: 1981     Years since quittin.7     Passive exposure: Past    Smokeless tobacco: Former   Vaping Use    Vaping status: Never Used   Substance and Sexual Activity    Alcohol use: Yes     Alcohol/week: 10.0 standard drinks of alcohol     Types: 10 Glasses of wine per week    Drug use: Not Currently     Types: Marijuana (Weed)    Sexual activity: Yes     Partners: Female     Social Determinants of

## 2025-03-04 RX ORDER — LISINOPRIL 10 MG/1
10 TABLET ORAL DAILY
Qty: 90 TABLET | Refills: 0 | Status: SHIPPED | OUTPATIENT
Start: 2025-03-04

## 2025-03-06 ENCOUNTER — TELEPHONE (OUTPATIENT)
Age: 60
End: 2025-03-06

## 2025-03-06 NOTE — TELEPHONE ENCOUNTER
----- Message from Dr. Rubio Vazquez MD sent at 3/4/2025  6:45 PM EST -----  Please schedule a follow up appointment for HTN. Thanks.

## 2025-03-06 NOTE — TELEPHONE ENCOUNTER
This writer attempt to contact pt regarding HTN f/u, left message advising pt to contact our office or schedule an appt through his [employer] [facility].

## 2025-06-16 RX ORDER — LISINOPRIL 10 MG/1
10 TABLET ORAL DAILY
Qty: 90 TABLET | Refills: 0 | OUTPATIENT
Start: 2025-06-16

## (undated) DEVICE — 3M™ IOBAN™ 2 ANTIMICROBIAL INCISE DRAPE 6650EZ: Brand: IOBAN™ 2

## (undated) DEVICE — SYR LR LCK 1ML GRAD NSAF 30ML --

## (undated) DEVICE — DRAPE XR C ARM 41X74IN LF --

## (undated) DEVICE — LIGHT HANDLE: Brand: DEVON

## (undated) DEVICE — STRYKER PERFORMANCE SERIES SAGITTAL BLADE: Brand: STRYKER PERFORMANCE SERIES

## (undated) DEVICE — SUTURE MCRYL SZ 3-0 L27IN ABSRB UD L24MM PS-1 3/8 CIR PRIM Y936H

## (undated) DEVICE — 3M™ TEGADERM™ TRANSPARENT FILM DRESSING FRAME STYLE, 1624W, 2-3/8 IN X 2-3/4 IN (6 CM X 7 CM), 100/CT 4CT/CASE: Brand: 3M™ TEGADERM™

## (undated) DEVICE — PREP KIT PEEL PTCH POVIDONE IOD

## (undated) DEVICE — 3M™ STERI-DRAPE™ U-DRAPE 1015: Brand: STERI-DRAPE™

## (undated) DEVICE — PIN FIX 4X170MM STRL -- 2/PK MAKO

## (undated) DEVICE — STERILE POLYISOPRENE POWDER-FREE SURGICAL GLOVES: Brand: PROTEXIS

## (undated) DEVICE — GOWN,SIRUS,NONRNF,SETINSLV,2XL,18/CS: Brand: MEDLINE

## (undated) DEVICE — Device

## (undated) DEVICE — SKIN MARKER,REGULAR TIP WITH RULER AND LABELS: Brand: DEVON

## (undated) DEVICE — KIT TRK HIP PROC VIZADISC

## (undated) DEVICE — SUT ETHLN 2-0 18IN FS BLK --

## (undated) DEVICE — INFECTION CONTROL KIT SYS

## (undated) DEVICE — DUAL IRRIGATION ADAPTOR

## (undated) DEVICE — GAUZE SPONGES,12 PLY: Brand: CURITY

## (undated) DEVICE — NDL PRT INJ NSAF BLNT 18GX1.5 --

## (undated) DEVICE — TIBURON SPLIT SHEET: Brand: CONVERTORS

## (undated) DEVICE — NEEDLE HYPO 18GA L1.5IN PNK S STL HUB POLYPR SHLD REG BVL

## (undated) DEVICE — REM POLYHESIVE ADULT PATIENT RETURN ELECTRODE: Brand: VALLEYLAB

## (undated) DEVICE — DRSG AQUACEL SURG 3.5 X 10IN -- CONVERT TO ITEM 370183

## (undated) DEVICE — STERILE POLYISOPRENE POWDER-FREE SURGICAL GLOVES WITH EMOLLIENT COATING: Brand: PROTEXIS

## (undated) DEVICE — DERMABOND SKIN ADH 0.7ML -- DERMABOND ADVANCED 12/BX

## (undated) DEVICE — CONTAINER,SPECIMEN,3OZ,OR STRL: Brand: MEDLINE

## (undated) DEVICE — (D)PREP SKN CHLRAPRP APPL 26ML -- CONVERT TO ITEM 371833

## (undated) DEVICE — INTENDED FOR TISSUE SEPARATION, AND OTHER PROCEDURES THAT REQUIRE A SHARP SURGICAL BLADE TO PUNCTURE OR CUT.: Brand: BARD-PARKER ® CARBON RIB-BACK BLADES

## (undated) DEVICE — SUTURE VCRL + SZ 1-0 L36IN ABSRB UD CTX 1/2 CIR TAPR PNT VCP977H

## (undated) DEVICE — 3M™ TEGADERM™ TRANSPARENT FILM DRESSING FRAME STYLE, 1626W, 4 IN X 4-3/4 IN (10 CM X 12 CM), 50/CT 4CT/CASE: Brand: 3M™ TEGADERM™

## (undated) DEVICE — DEVON™ KNEE AND BODY STRAP 60" X 3" (1.5 M X 7.6 CM): Brand: DEVON

## (undated) DEVICE — HANDPIECE SET WITH COAXIAL HIGH FLOW TIP AND SUCTION TUBE: Brand: INTERPULSE

## (undated) DEVICE — T4 HOOD

## (undated) DEVICE — TELFA NON-ADHERENT PADS PREPAK: Brand: TELFA

## (undated) DEVICE — SOLUTION IRRIG 3000ML 0.9% SOD CHL FLX CONT 0797208] ICU MEDICAL INC]

## (undated) DEVICE — SUTURE STRATAFIX SYMMETRIC SZ 1 L18IN ABSRB VLT CT1 L36CM SXPP1A404

## (undated) DEVICE — 6619 2 PTNT ISO SYS INCISE AREA&LT;(&GT;&&LT;)&GT;P: Brand: STERI-DRAPE™ IOBAN™ 2

## (undated) DEVICE — KIT DRP FOR RIO ROBOTIC ARM ASST SYS

## (undated) DEVICE — SUTURE VCRL SZ 2-0 L36IN ABSRB UD L36MM CT-1 1/2 CIR J945H

## (undated) DEVICE — MARKER RAD KNEE TIB CKPT STEREOTAXIC IMAG LESION LOC